# Patient Record
Sex: FEMALE | Race: WHITE | NOT HISPANIC OR LATINO | Employment: OTHER | URBAN - METROPOLITAN AREA
[De-identification: names, ages, dates, MRNs, and addresses within clinical notes are randomized per-mention and may not be internally consistent; named-entity substitution may affect disease eponyms.]

---

## 2023-04-07 LAB
ALANINE AMINOTRANSFERASE (SGPT) (U/L) IN SER/PLAS: 18 U/L (ref 7–45)
ALBUMIN (G/DL) IN SER/PLAS: 4 G/DL (ref 3.4–5)
ALKALINE PHOSPHATASE (U/L) IN SER/PLAS: 170 U/L (ref 33–136)
ANION GAP IN SER/PLAS: 11 MMOL/L (ref 10–20)
ASPARTATE AMINOTRANSFERASE (SGOT) (U/L) IN SER/PLAS: 16 U/L (ref 9–39)
BILIRUBIN TOTAL (MG/DL) IN SER/PLAS: 0.5 MG/DL (ref 0–1.2)
CALCIUM (MG/DL) IN SER/PLAS: 9.1 MG/DL (ref 8.6–10.3)
CARBON DIOXIDE, TOTAL (MMOL/L) IN SER/PLAS: 30 MMOL/L (ref 21–32)
CHLORIDE (MMOL/L) IN SER/PLAS: 100 MMOL/L (ref 98–107)
CHOLESTEROL (MG/DL) IN SER/PLAS: 160 MG/DL (ref 0–199)
CHOLESTEROL IN HDL (MG/DL) IN SER/PLAS: 52.1 MG/DL
CHOLESTEROL/HDL RATIO: 3.1
CREATININE (MG/DL) IN SER/PLAS: 0.72 MG/DL (ref 0.5–1.05)
ERYTHROCYTE DISTRIBUTION WIDTH (RATIO) BY AUTOMATED COUNT: 14.2 % (ref 11.5–14.5)
ERYTHROCYTE MEAN CORPUSCULAR HEMOGLOBIN CONCENTRATION (G/DL) BY AUTOMATED: 31.4 G/DL (ref 32–36)
ERYTHROCYTE MEAN CORPUSCULAR VOLUME (FL) BY AUTOMATED COUNT: 85 FL (ref 80–100)
ERYTHROCYTES (10*6/UL) IN BLOOD BY AUTOMATED COUNT: 4.93 X10E12/L (ref 4–5.2)
GFR FEMALE: >90 ML/MIN/1.73M2
GLUCOSE (MG/DL) IN SER/PLAS: 289 MG/DL (ref 74–99)
HEMATOCRIT (%) IN BLOOD BY AUTOMATED COUNT: 41.7 % (ref 36–46)
HEMOGLOBIN (G/DL) IN BLOOD: 13.1 G/DL (ref 12–16)
LDL: 76 MG/DL (ref 0–99)
LEUKOCYTES (10*3/UL) IN BLOOD BY AUTOMATED COUNT: 7.1 X10E9/L (ref 4.4–11.3)
NRBC (PER 100 WBCS) BY AUTOMATED COUNT: 0 /100 WBC (ref 0–0)
PLATELETS (10*3/UL) IN BLOOD AUTOMATED COUNT: 230 X10E9/L (ref 150–450)
POTASSIUM (MMOL/L) IN SER/PLAS: 3.8 MMOL/L (ref 3.5–5.3)
PROTEIN TOTAL: 7.1 G/DL (ref 6.4–8.2)
SODIUM (MMOL/L) IN SER/PLAS: 137 MMOL/L (ref 136–145)
THYROTROPIN (MIU/L) IN SER/PLAS BY DETECTION LIMIT <= 0.05 MIU/L: <0.01 MIU/L (ref 0.44–3.98)
TRIGLYCERIDE (MG/DL) IN SER/PLAS: 162 MG/DL (ref 0–149)
UREA NITROGEN (MG/DL) IN SER/PLAS: 28 MG/DL (ref 6–23)
VLDL: 32 MG/DL (ref 0–40)

## 2023-04-08 LAB
CALCIDIOL (25 OH VITAMIN D3) (NG/ML) IN SER/PLAS: 29 NG/ML
ESTIMATED AVERAGE GLUCOSE FOR HBA1C: 220 MG/DL
HEMOGLOBIN A1C/HEMOGLOBIN TOTAL IN BLOOD: 9.3 %

## 2023-07-21 LAB
ANION GAP IN SER/PLAS: 13 MMOL/L (ref 10–20)
CALCIUM (MG/DL) IN SER/PLAS: 9.4 MG/DL (ref 8.6–10.3)
CARBON DIOXIDE, TOTAL (MMOL/L) IN SER/PLAS: 28 MMOL/L (ref 21–32)
CHLORIDE (MMOL/L) IN SER/PLAS: 102 MMOL/L (ref 98–107)
CREATININE (MG/DL) IN SER/PLAS: 0.75 MG/DL (ref 0.5–1.05)
GFR FEMALE: 86 ML/MIN/1.73M2
GLUCOSE (MG/DL) IN SER/PLAS: 220 MG/DL (ref 74–99)
POTASSIUM (MMOL/L) IN SER/PLAS: 4.5 MMOL/L (ref 3.5–5.3)
SODIUM (MMOL/L) IN SER/PLAS: 138 MMOL/L (ref 136–145)
UREA NITROGEN (MG/DL) IN SER/PLAS: 22 MG/DL (ref 6–23)

## 2023-07-22 LAB
ESTIMATED AVERAGE GLUCOSE FOR HBA1C: 220 MG/DL
HEMOGLOBIN A1C/HEMOGLOBIN TOTAL IN BLOOD: 9.3 %

## 2023-08-22 LAB
ALANINE AMINOTRANSFERASE (SGPT) (U/L) IN SER/PLAS: 20 U/L (ref 7–45)
ALBUMIN (G/DL) IN SER/PLAS: 4 G/DL (ref 3.4–5)
ALKALINE PHOSPHATASE (U/L) IN SER/PLAS: 211 U/L (ref 33–136)
ANION GAP IN SER/PLAS: 13 MMOL/L (ref 10–20)
ASPARTATE AMINOTRANSFERASE (SGOT) (U/L) IN SER/PLAS: 19 U/L (ref 9–39)
BASOPHILS (10*3/UL) IN BLOOD BY AUTOMATED COUNT: 0.01 X10E9/L (ref 0–0.1)
BASOPHILS/100 LEUKOCYTES IN BLOOD BY AUTOMATED COUNT: 0.2 % (ref 0–2)
BILIRUBIN TOTAL (MG/DL) IN SER/PLAS: 0.5 MG/DL (ref 0–1.2)
CALCIUM (MG/DL) IN SER/PLAS: 9.2 MG/DL (ref 8.6–10.3)
CARBON DIOXIDE, TOTAL (MMOL/L) IN SER/PLAS: 25 MMOL/L (ref 21–32)
CHLORIDE (MMOL/L) IN SER/PLAS: 107 MMOL/L (ref 98–107)
CREATININE (MG/DL) IN SER/PLAS: 0.81 MG/DL (ref 0.5–1.05)
EOSINOPHILS (10*3/UL) IN BLOOD BY AUTOMATED COUNT: 0.1 X10E9/L (ref 0–0.7)
EOSINOPHILS/100 LEUKOCYTES IN BLOOD BY AUTOMATED COUNT: 1.6 % (ref 0–6)
ERYTHROCYTE DISTRIBUTION WIDTH (RATIO) BY AUTOMATED COUNT: 13.4 % (ref 11.5–14.5)
ERYTHROCYTE MEAN CORPUSCULAR HEMOGLOBIN CONCENTRATION (G/DL) BY AUTOMATED: 30.6 G/DL (ref 32–36)
ERYTHROCYTE MEAN CORPUSCULAR VOLUME (FL) BY AUTOMATED COUNT: 85 FL (ref 80–100)
ERYTHROCYTES (10*6/UL) IN BLOOD BY AUTOMATED COUNT: 5.08 X10E12/L (ref 4–5.2)
GFR FEMALE: 79 ML/MIN/1.73M2
GLUCOSE (MG/DL) IN SER/PLAS: 216 MG/DL (ref 74–99)
HEMATOCRIT (%) IN BLOOD BY AUTOMATED COUNT: 43.4 % (ref 36–46)
HEMOGLOBIN (G/DL) IN BLOOD: 13.3 G/DL (ref 12–16)
IMMATURE GRANULOCYTES/100 LEUKOCYTES IN BLOOD BY AUTOMATED COUNT: 0.2 % (ref 0–0.9)
INR IN PPP BY COAGULATION ASSAY: 1.1 (ref 0.9–1.1)
LEUKOCYTES (10*3/UL) IN BLOOD BY AUTOMATED COUNT: 6.3 X10E9/L (ref 4.4–11.3)
LYMPHOCYTES (10*3/UL) IN BLOOD BY AUTOMATED COUNT: 1.43 X10E9/L (ref 1.2–4.8)
LYMPHOCYTES/100 LEUKOCYTES IN BLOOD BY AUTOMATED COUNT: 22.9 % (ref 13–44)
MONOCYTES (10*3/UL) IN BLOOD BY AUTOMATED COUNT: 0.43 X10E9/L (ref 0.1–1)
MONOCYTES/100 LEUKOCYTES IN BLOOD BY AUTOMATED COUNT: 6.9 % (ref 2–10)
NEUTROPHILS (10*3/UL) IN BLOOD BY AUTOMATED COUNT: 4.27 X10E9/L (ref 1.2–7.7)
NEUTROPHILS/100 LEUKOCYTES IN BLOOD BY AUTOMATED COUNT: 68.2 % (ref 40–80)
NRBC (PER 100 WBCS) BY AUTOMATED COUNT: 0 /100 WBC (ref 0–0)
PLATELETS (10*3/UL) IN BLOOD AUTOMATED COUNT: 231 X10E9/L (ref 150–450)
POTASSIUM (MMOL/L) IN SER/PLAS: 4.2 MMOL/L (ref 3.5–5.3)
PROTEIN TOTAL: 7.2 G/DL (ref 6.4–8.2)
PROTHROMBIN TIME (PT) IN PPP BY COAGULATION ASSAY: 11.9 SEC (ref 9.8–12.8)
SODIUM (MMOL/L) IN SER/PLAS: 141 MMOL/L (ref 136–145)
UREA NITROGEN (MG/DL) IN SER/PLAS: 29 MG/DL (ref 6–23)

## 2023-08-23 LAB — ALPHA-1 FETOPROTEIN (NG/ML) IN SER/PLAS: 5 NG/ML (ref 0–9)

## 2023-09-06 ENCOUNTER — HOSPITAL ENCOUNTER (OUTPATIENT)
Dept: DATA CONVERSION | Facility: HOSPITAL | Age: 68
End: 2023-09-06
Attending: INTERNAL MEDICINE | Admitting: INTERNAL MEDICINE
Payer: COMMERCIAL

## 2023-09-06 DIAGNOSIS — K20.90 ESOPHAGITIS, UNSPECIFIED WITHOUT BLEEDING: ICD-10-CM

## 2023-09-06 DIAGNOSIS — K74.60 UNSPECIFIED CIRRHOSIS OF LIVER (MULTI): ICD-10-CM

## 2023-09-06 DIAGNOSIS — I85.10 SECONDARY ESOPHAGEAL VARICES WITHOUT BLEEDING (MULTI): ICD-10-CM

## 2023-09-06 LAB — POCT GLUCOSE: 159 MG/DL (ref 74–99)

## 2023-09-29 VITALS — BODY MASS INDEX: 37.49 KG/M2 | WEIGHT: 211.64 LBS

## 2023-10-09 ENCOUNTER — LAB (OUTPATIENT)
Dept: LAB | Facility: LAB | Age: 68
End: 2023-10-09
Payer: COMMERCIAL

## 2023-10-09 DIAGNOSIS — I10 ESSENTIAL (PRIMARY) HYPERTENSION: ICD-10-CM

## 2023-10-09 DIAGNOSIS — E78.00 PURE HYPERCHOLESTEROLEMIA, UNSPECIFIED: ICD-10-CM

## 2023-10-09 DIAGNOSIS — E11.9 TYPE 2 DIABETES MELLITUS WITHOUT COMPLICATIONS (MULTI): Primary | ICD-10-CM

## 2023-10-09 DIAGNOSIS — E55.9 VITAMIN D DEFICIENCY, UNSPECIFIED: ICD-10-CM

## 2023-10-09 LAB
25(OH)D3 SERPL-MCNC: 35 NG/ML (ref 30–100)
ALBUMIN SERPL BCP-MCNC: 4.2 G/DL (ref 3.4–5)
ALP SERPL-CCNC: 171 U/L (ref 33–136)
ALT SERPL W P-5'-P-CCNC: 20 U/L (ref 7–45)
ANION GAP SERPL CALC-SCNC: 13 MMOL/L (ref 10–20)
AST SERPL W P-5'-P-CCNC: 19 U/L (ref 9–39)
BILIRUB SERPL-MCNC: 0.6 MG/DL (ref 0–1.2)
BUN SERPL-MCNC: 22 MG/DL (ref 6–23)
CALCIUM SERPL-MCNC: 9.1 MG/DL (ref 8.6–10.3)
CHLORIDE SERPL-SCNC: 101 MMOL/L (ref 98–107)
CHOLEST SERPL-MCNC: 145 MG/DL (ref 0–199)
CHOLESTEROL/HDL RATIO: 3.6
CO2 SERPL-SCNC: 27 MMOL/L (ref 21–32)
CREAT SERPL-MCNC: 0.72 MG/DL (ref 0.5–1.05)
ERYTHROCYTE [DISTWIDTH] IN BLOOD BY AUTOMATED COUNT: 13.2 % (ref 11.5–14.5)
EST. AVERAGE GLUCOSE BLD GHB EST-MCNC: 212 MG/DL
GFR SERPL CREATININE-BSD FRML MDRD: >90 ML/MIN/1.73M*2
GLUCOSE SERPL-MCNC: 337 MG/DL (ref 74–99)
HBA1C MFR BLD: 9 %
HCT VFR BLD AUTO: 45.8 % (ref 36–46)
HDLC SERPL-MCNC: 40.4 MG/DL
HGB BLD-MCNC: 14 G/DL (ref 12–16)
LDLC SERPL CALC-MCNC: 82 MG/DL (ref 140–190)
MCH RBC QN AUTO: 25.7 PG (ref 26–34)
MCHC RBC AUTO-ENTMCNC: 30.6 G/DL (ref 32–36)
MCV RBC AUTO: 84 FL (ref 80–100)
NON HDL CHOLESTEROL: 105 MG/DL (ref 0–149)
NRBC BLD-RTO: 0 /100 WBCS (ref 0–0)
PLATELET # BLD AUTO: 243 X10*3/UL (ref 150–450)
PMV BLD AUTO: 10.5 FL (ref 7.5–11.5)
POTASSIUM SERPL-SCNC: 4.9 MMOL/L (ref 3.5–5.3)
PROT SERPL-MCNC: 7.1 G/DL (ref 6.4–8.2)
RBC # BLD AUTO: 5.45 X10*6/UL (ref 4–5.2)
SODIUM SERPL-SCNC: 136 MMOL/L (ref 136–145)
TRIGL SERPL-MCNC: 113 MG/DL (ref 0–149)
VLDL: 23 MG/DL (ref 0–40)
WBC # BLD AUTO: 5.2 X10*3/UL (ref 4.4–11.3)

## 2023-10-09 PROCEDURE — 80053 COMPREHEN METABOLIC PANEL: CPT

## 2023-10-09 PROCEDURE — 82306 VITAMIN D 25 HYDROXY: CPT

## 2023-10-09 PROCEDURE — 36415 COLL VENOUS BLD VENIPUNCTURE: CPT

## 2023-10-09 PROCEDURE — 80061 LIPID PANEL: CPT

## 2023-10-09 PROCEDURE — 85027 COMPLETE CBC AUTOMATED: CPT

## 2023-10-09 PROCEDURE — 83036 HEMOGLOBIN GLYCOSYLATED A1C: CPT

## 2023-10-24 RX ORDER — FLASH GLUCOSE SENSOR
KIT MISCELLANEOUS
COMMUNITY
Start: 2023-09-19

## 2023-10-24 RX ORDER — GLIMEPIRIDE 4 MG/1
4 TABLET ORAL
COMMUNITY

## 2023-10-24 RX ORDER — CYCLOBENZAPRINE HCL 5 MG
5 TABLET ORAL 3 TIMES DAILY PRN
COMMUNITY
Start: 2023-09-04

## 2023-10-24 RX ORDER — BLOOD SUGAR DIAGNOSTIC
STRIP MISCELLANEOUS
COMMUNITY

## 2023-10-24 RX ORDER — APIXABAN 5 MG/1
5 TABLET, FILM COATED ORAL 2 TIMES DAILY
COMMUNITY
Start: 2019-10-30

## 2023-10-24 RX ORDER — METHIMAZOLE 5 MG/1
5 TABLET ORAL 3 TIMES DAILY
COMMUNITY
Start: 2021-03-30

## 2023-10-24 RX ORDER — METRONIDAZOLE 500 MG/1
500 TABLET ORAL
COMMUNITY
Start: 2023-10-04

## 2023-10-24 RX ORDER — SPIRONOLACTONE 25 MG/1
25 TABLET ORAL DAILY
COMMUNITY
Start: 2023-07-18 | End: 2024-04-30

## 2023-10-24 RX ORDER — GABAPENTIN 100 MG/1
100 CAPSULE ORAL 2 TIMES DAILY
COMMUNITY
Start: 2023-04-04

## 2023-10-24 RX ORDER — HYDROCODONE BITARTRATE AND ACETAMINOPHEN 5; 325 MG/1; MG/1
1 TABLET ORAL EVERY 4 HOURS PRN
COMMUNITY
Start: 2016-12-07

## 2023-10-24 RX ORDER — CLOTRIMAZOLE 1 G/ML
SOLUTION TOPICAL
COMMUNITY

## 2023-10-24 RX ORDER — TORSEMIDE 20 MG/1
20 TABLET ORAL DAILY
COMMUNITY

## 2023-10-24 RX ORDER — LORAZEPAM 1 MG/1
1 TABLET ORAL DAILY PRN
COMMUNITY
Start: 2020-01-24

## 2023-10-24 RX ORDER — EMPAGLIFLOZIN 25 MG/1
25 TABLET, FILM COATED ORAL DAILY
COMMUNITY
Start: 2023-01-20 | End: 2024-02-21 | Stop reason: WASHOUT

## 2023-10-24 RX ORDER — METOPROLOL TARTRATE 25 MG/1
25 TABLET, FILM COATED ORAL 2 TIMES DAILY
COMMUNITY

## 2023-10-24 RX ORDER — OMEPRAZOLE 40 MG/1
40 CAPSULE, DELAYED RELEASE ORAL DAILY
COMMUNITY
Start: 2023-09-18 | End: 2024-03-12

## 2023-10-24 RX ORDER — NEOMYCIN SULFATE, POLYMYXIN B SULFATE AND HYDROCORTISONE 10; 3.5; 1 MG/ML; MG/ML; [USP'U]/ML
SUSPENSION/ DROPS AURICULAR (OTIC)
COMMUNITY
Start: 2023-09-28

## 2023-10-24 RX ORDER — NITROGLYCERIN 0.4 MG/1
0.4 TABLET SUBLINGUAL EVERY 5 MIN PRN
COMMUNITY
Start: 2019-11-22

## 2023-10-24 RX ORDER — MUPIROCIN 20 MG/G
OINTMENT TOPICAL
COMMUNITY
Start: 2023-09-28

## 2023-10-24 RX ORDER — DAPAGLIFLOZIN 10 MG/1
10 TABLET, FILM COATED ORAL DAILY
COMMUNITY
Start: 2023-09-18 | End: 2023-11-21

## 2023-10-24 RX ORDER — GABAPENTIN 300 MG/1
300 CAPSULE ORAL NIGHTLY
COMMUNITY
Start: 2023-10-02 | End: 2024-03-19

## 2023-10-24 RX ORDER — AMLODIPINE BESYLATE 10 MG/1
10 TABLET ORAL DAILY
COMMUNITY
Start: 2020-01-24 | End: 2023-10-26 | Stop reason: SDUPTHER

## 2023-10-24 RX ORDER — FLASH GLUCOSE SCANNING READER
EACH MISCELLANEOUS
COMMUNITY
Start: 2023-06-28

## 2023-10-25 PROBLEM — L29.9 EAR ITCH: Status: ACTIVE | Noted: 2023-10-25

## 2023-10-25 PROBLEM — I48.11 LONGSTANDING PERSISTENT ATRIAL FIBRILLATION (MULTI): Status: ACTIVE | Noted: 2023-10-25

## 2023-10-25 PROBLEM — D18.01 HEMANGIOMA OF SKIN AND SUBCUTANEOUS TISSUE: Status: ACTIVE | Noted: 2022-08-12

## 2023-10-25 PROBLEM — L30.9 DERMATITIS: Status: ACTIVE | Noted: 2022-08-12

## 2023-10-25 PROBLEM — R94.39 ABNORMAL NUCLEAR STRESS TEST: Status: ACTIVE | Noted: 2023-10-25

## 2023-10-25 PROBLEM — D22.5 MELANOCYTIC NEVI OF TRUNK: Status: ACTIVE | Noted: 2022-08-12

## 2023-10-25 PROBLEM — L57.9 SKIN CHANGES DUE TO CHRONIC EXPOSURE TO NONIONIZING RADIATION, UNSPECIFIED: Status: ACTIVE | Noted: 2022-08-12

## 2023-10-25 PROBLEM — U07.1 COVID-19: Status: RESOLVED | Noted: 2023-10-25 | Resolved: 2023-10-25

## 2023-10-25 PROBLEM — R77.2 ELEVATED AFP: Status: ACTIVE | Noted: 2023-10-25

## 2023-10-25 PROBLEM — L30.4 INTERTRIGO: Status: ACTIVE | Noted: 2022-08-12

## 2023-10-25 PROBLEM — H73.002 ACUTE MYRINGITIS OF LEFT EAR: Status: RESOLVED | Noted: 2023-10-25 | Resolved: 2023-10-25

## 2023-10-25 PROBLEM — U07.1 COVID-19: Status: ACTIVE | Noted: 2023-10-25

## 2023-10-25 PROBLEM — H60.92 OTITIS EXTERNA, LEFT: Status: ACTIVE | Noted: 2023-10-25

## 2023-10-25 PROBLEM — B18.2 CHRONIC HEPATITIS C WITH CIRRHOSIS (MULTI): Status: ACTIVE | Noted: 2023-10-25

## 2023-10-25 PROBLEM — H73.002 ACUTE MYRINGITIS OF LEFT EAR: Status: ACTIVE | Noted: 2023-10-25

## 2023-10-25 PROBLEM — R10.2 LEFT ADNEXAL TENDERNESS: Status: ACTIVE | Noted: 2023-10-25

## 2023-10-25 PROBLEM — I25.10 CORONARY ARTERY DISEASE: Status: ACTIVE | Noted: 2023-10-25

## 2023-10-25 PROBLEM — K52.9 CHRONIC DIARRHEA OF UNKNOWN ORIGIN: Status: ACTIVE | Noted: 2023-10-25

## 2023-10-25 PROBLEM — B36.9 OTOMYCOSIS OF RIGHT EAR: Status: ACTIVE | Noted: 2023-10-25

## 2023-10-25 PROBLEM — N39.3 STRESS INCONTINENCE, FEMALE: Status: ACTIVE | Noted: 2023-10-25

## 2023-10-25 PROBLEM — M25.562 LEFT KNEE PAIN: Status: ACTIVE | Noted: 2023-10-25

## 2023-10-25 PROBLEM — M79.673 ACUTE FOOT PAIN: Status: RESOLVED | Noted: 2023-10-25 | Resolved: 2023-10-25

## 2023-10-25 PROBLEM — L57.0 ACTINIC KERATOSIS: Status: ACTIVE | Noted: 2022-08-12

## 2023-10-25 PROBLEM — R07.89 CHEST TIGHTNESS: Status: ACTIVE | Noted: 2023-10-25

## 2023-10-25 PROBLEM — I50.32 CHRONIC DIASTOLIC (CONGESTIVE) HEART FAILURE (MULTI): Status: ACTIVE | Noted: 2023-10-25

## 2023-10-25 PROBLEM — D36.9 BENIGN NEOPLASM: Status: ACTIVE | Noted: 2022-08-12

## 2023-10-25 PROBLEM — L21.9 SEBORRHEIC DERMATITIS: Status: ACTIVE | Noted: 2022-08-12

## 2023-10-25 PROBLEM — D18.00 ANGIOMA: Status: ACTIVE | Noted: 2022-08-12

## 2023-10-25 PROBLEM — H52.00: Status: ACTIVE | Noted: 2023-10-25

## 2023-10-25 PROBLEM — J34.89 NASAL DRYNESS: Status: ACTIVE | Noted: 2023-10-25

## 2023-10-25 PROBLEM — R10.32 CHRONIC LLQ PAIN: Status: ACTIVE | Noted: 2023-10-25

## 2023-10-25 PROBLEM — M17.0 PRIMARY OSTEOARTHRITIS OF BOTH KNEES: Status: ACTIVE | Noted: 2023-10-25

## 2023-10-25 PROBLEM — H25.813 COMBINED FORM OF SENILE CATARACT OF BOTH EYES: Status: ACTIVE | Noted: 2023-10-25

## 2023-10-25 PROBLEM — D22.4 MELANOCYTIC NEVI OF SCALP AND NECK: Status: ACTIVE | Noted: 2022-08-12

## 2023-10-25 PROBLEM — M25.512 ACUTE PAIN OF LEFT SHOULDER: Status: RESOLVED | Noted: 2023-10-25 | Resolved: 2023-10-25

## 2023-10-25 PROBLEM — D48.5 NEOPLASM OF UNCERTAIN BEHAVIOR OF SKIN: Status: ACTIVE | Noted: 2022-08-12

## 2023-10-25 PROBLEM — L81.4 LENTIGINES: Status: ACTIVE | Noted: 2022-08-12

## 2023-10-25 PROBLEM — G89.29 CHRONIC BILATERAL LOW BACK PAIN WITH RIGHT-SIDED SCIATICA: Status: ACTIVE | Noted: 2023-10-25

## 2023-10-25 PROBLEM — R06.02 SHORTNESS OF BREATH: Status: ACTIVE | Noted: 2023-10-25

## 2023-10-25 PROBLEM — L60.3 ONYCHODYSTROPHY: Status: ACTIVE | Noted: 2022-08-12

## 2023-10-25 PROBLEM — K74.60 CHRONIC HEPATITIS C WITH CIRRHOSIS (MULTI): Status: ACTIVE | Noted: 2023-10-25

## 2023-10-25 PROBLEM — E11.9 TYPE 2 DIABETES MELLITUS WITHOUT COMPLICATIONS (MULTI): Status: ACTIVE | Noted: 2023-10-25

## 2023-10-25 PROBLEM — M25.512 ACUTE PAIN OF LEFT SHOULDER: Status: ACTIVE | Noted: 2023-10-25

## 2023-10-25 PROBLEM — D22.60 MELANOCYTIC NEVI OF UNSPECIFIED UPPER LIMB, INCLUDING SHOULDER: Status: ACTIVE | Noted: 2022-08-12

## 2023-10-25 PROBLEM — H61.22 EXCESSIVE CERUMEN IN LEFT EAR CANAL: Status: ACTIVE | Noted: 2023-10-25

## 2023-10-25 PROBLEM — M79.673 ACUTE FOOT PAIN: Status: ACTIVE | Noted: 2023-10-25

## 2023-10-25 PROBLEM — I85.00 ESOPHAGEAL VARICES (MULTI): Status: ACTIVE | Noted: 2023-10-25

## 2023-10-25 PROBLEM — Z85.828 PERSONAL HISTORY OF OTHER MALIGNANT NEOPLASM OF SKIN: Status: ACTIVE | Noted: 2022-08-12

## 2023-10-25 PROBLEM — D22.39 MELANOCYTIC NEVI OF OTHER PARTS OF FACE: Status: ACTIVE | Noted: 2022-08-12

## 2023-10-25 PROBLEM — H52.229: Status: ACTIVE | Noted: 2023-10-25

## 2023-10-25 PROBLEM — M25.562 BILATERAL KNEE PAIN: Status: ACTIVE | Noted: 2023-10-25

## 2023-10-25 PROBLEM — M17.12 PRIMARY OSTEOARTHRITIS OF LEFT KNEE: Status: ACTIVE | Noted: 2023-10-25

## 2023-10-25 PROBLEM — K76.82 HEPATIC ENCEPHALOPATHY (MULTI): Status: ACTIVE | Noted: 2023-10-25

## 2023-10-25 PROBLEM — G89.29 CHRONIC LLQ PAIN: Status: ACTIVE | Noted: 2023-10-25

## 2023-10-25 PROBLEM — H62.41 OTOMYCOSIS OF RIGHT EAR: Status: ACTIVE | Noted: 2023-10-25

## 2023-10-25 PROBLEM — H93.8X1 SENSATION OF PLUGGED EAR ON RIGHT SIDE: Status: ACTIVE | Noted: 2023-10-25

## 2023-10-25 PROBLEM — K58.0 IRRITABLE BOWEL SYNDROME WITH DIARRHEA: Status: ACTIVE | Noted: 2023-10-25

## 2023-10-25 PROBLEM — R60.0 LEG EDEMA: Status: ACTIVE | Noted: 2023-10-25

## 2023-10-25 PROBLEM — M25.561 BILATERAL KNEE PAIN: Status: ACTIVE | Noted: 2023-10-25

## 2023-10-25 PROBLEM — J34.89 NASAL CRUSTING: Status: ACTIVE | Noted: 2023-10-25

## 2023-10-25 PROBLEM — D22.70 MELANOCYTIC NEVI OF UNSPECIFIED LOWER LIMB, INCLUDING HIP: Status: ACTIVE | Noted: 2022-08-12

## 2023-10-25 PROBLEM — H52.03 HYPEROPIA OF BOTH EYES: Status: ACTIVE | Noted: 2023-10-25

## 2023-10-25 PROBLEM — M54.41 CHRONIC BILATERAL LOW BACK PAIN WITH RIGHT-SIDED SCIATICA: Status: ACTIVE | Noted: 2023-10-25

## 2023-10-25 PROBLEM — L81.4 OTHER MELANIN HYPERPIGMENTATION: Status: ACTIVE | Noted: 2022-08-12

## 2023-10-26 ENCOUNTER — OFFICE VISIT (OUTPATIENT)
Dept: CARDIOLOGY | Facility: CLINIC | Age: 68
End: 2023-10-26
Payer: COMMERCIAL

## 2023-10-26 VITALS
HEIGHT: 63 IN | BODY MASS INDEX: 37.49 KG/M2 | OXYGEN SATURATION: 97 % | HEART RATE: 66 BPM | WEIGHT: 211.6 LBS | SYSTOLIC BLOOD PRESSURE: 118 MMHG | DIASTOLIC BLOOD PRESSURE: 64 MMHG

## 2023-10-26 DIAGNOSIS — I50.32 CHRONIC DIASTOLIC (CONGESTIVE) HEART FAILURE (MULTI): ICD-10-CM

## 2023-10-26 DIAGNOSIS — I10 PRIMARY HYPERTENSION: ICD-10-CM

## 2023-10-26 DIAGNOSIS — I25.10 CORONARY ARTERY DISEASE INVOLVING NATIVE CORONARY ARTERY OF NATIVE HEART WITHOUT ANGINA PECTORIS: Primary | ICD-10-CM

## 2023-10-26 DIAGNOSIS — I48.11 LONGSTANDING PERSISTENT ATRIAL FIBRILLATION (MULTI): ICD-10-CM

## 2023-10-26 PROCEDURE — 3078F DIAST BP <80 MM HG: CPT | Performed by: INTERNAL MEDICINE

## 2023-10-26 PROCEDURE — 1126F AMNT PAIN NOTED NONE PRSNT: CPT | Performed by: INTERNAL MEDICINE

## 2023-10-26 PROCEDURE — 3048F LDL-C <100 MG/DL: CPT | Performed by: INTERNAL MEDICINE

## 2023-10-26 PROCEDURE — 1036F TOBACCO NON-USER: CPT | Performed by: INTERNAL MEDICINE

## 2023-10-26 PROCEDURE — 1159F MED LIST DOCD IN RCRD: CPT | Performed by: INTERNAL MEDICINE

## 2023-10-26 PROCEDURE — 99214 OFFICE O/P EST MOD 30 MIN: CPT | Performed by: INTERNAL MEDICINE

## 2023-10-26 PROCEDURE — 3074F SYST BP LT 130 MM HG: CPT | Performed by: INTERNAL MEDICINE

## 2023-10-26 PROCEDURE — 3052F HG A1C>EQUAL 8.0%<EQUAL 9.0%: CPT | Performed by: INTERNAL MEDICINE

## 2023-10-26 RX ORDER — AMLODIPINE BESYLATE 5 MG/1
5 TABLET ORAL DAILY
Qty: 90 TABLET | Refills: 3 | Status: SHIPPED | OUTPATIENT
Start: 2023-10-26 | End: 2024-10-25

## 2023-10-26 NOTE — PROGRESS NOTES
Subjective   Zeny Scott is a 68 y.o. female.    Chief Complaint:  Follow-up coronary disease, congestive heart failure, atrial fibrillation, shortness of breath.    HPI    She was vacationing in [place] in August.  She had a fall.  She fell hitting her chest on the floor.  No head injuries.  Did go to the hospital.  Had a CT of the brain which was negative.  Also had a CT of the chest which demonstrated no significant abnormalities.  Subsequent to that time she still having some chest pain.  It is pleuritic in character.  Occurs when she has a deep breath in.  Generally located on the lateral aspects of both sides of the chest.  Does have shortness of breath and dyspnea with exertion.  Denies palpitations or chest pressure.  Has been struggling with degenerative arthritis.  Also has been struggling with her diabetes.    Cardiac catheterization performed on 2022 demonstrated 30 to 40% left anterior descending coronary artery stenosis with a normal circumflex and a normal right coronary artery. Medical therapy was recommended.     Her diagnosis of coronary artery disease is also based on a positive calcium score of 676 consistent with the presence of extensive atherosclerotic coronary artery disease.     Her past cardiac history significant for the presence of atrial fibrillation. In  she presented with atrial fibrillation and spontaneously converted. In 2019 she had an EKG which demonstrated atrial fibrillation.      Risk factors for coronary disease include hypertension, diabetes, and a positive family history of premature coronary artery disease.     She has a history of cirrhosis of the liver. She's also had a cholecystectomy. Has a history of elevated liver enzymes on statin therapy.     She is . Has 5 children one of which .  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     Allergies  Medication    · oxycodone   Rash;   "2018; Recorded By: Letty Chavez; 9/22/2020 9:30:55 AM   · Percocet TABS   Recorded By: Radha Roberts; 7/13/2015 9:50:08 AM  NonMedication    · Nickel   Recorded By: Kade Savage; 10/7/2022 4:23:42 PM     Family History  Mother    · Family history of Alzheimer's disease (V17.2) (Z82.0)  Father    · Family history of cardiac disorder (V17.49) (Z82.49)   · Family history of kidney disease (V18.69) (Z84.1)   · Family history of kidney disease (V18.69) (Z84.1)     Social History  Problems    · Never smoker   ·     Review of Systems   All other systems reviewed and are negative.      Objective   Constitutional:       Appearance: Not in distress.   Neck:      Vascular: JVD normal.   Pulmonary:      Breath sounds: Normal breath sounds.   Cardiovascular:      Normal rate. Irregular rhythm. Normal S1. Normal S2.       Murmurs: There is no murmur.      No gallop.    Pulses:     Intact distal pulses.   Edema:     Peripheral edema absent.   Abdominal:      General: There is no distension.      Palpations: Abdomen is soft.   Neurological:      Mental Status: Alert.         Visit Vitals  /64 (BP Location: Left arm, Patient Position: Sitting, BP Cuff Size: Large adult)   Pulse 66   Ht 1.6 m (5' 3\")   Wt 96 kg (211 lb 9.6 oz)   SpO2 97%   BMI 37.48 kg/m²   Smoking Status Never   BSA 2.07 m²        Lab Review:   Lab Results   Component Value Date     10/09/2023    K 4.9 10/09/2023     10/09/2023    CO2 27 10/09/2023    BUN 22 10/09/2023    CREATININE 0.72 10/09/2023    GLUCOSE 337 (H) 10/09/2023    CALCIUM 9.1 10/09/2023       Assessment:    1.  Atrial fibrillation.  Has permanent atrial fibrillation.  Controlled ventricular response.  On anticoagulation therapy.  Has been in atrial fibrillation since 2017.    2.  Coronary disease.  Mild disease by cardiac catheterization performed in 2022.    3.  Hyperlipidemia.  Cholesterol 145, HDL 40, LDL 82.    4.  Chest injury.  Lungs are clear.  Oxygen saturation is normal.  " Wants to see a pulmonologist but I do not think there is a need.    5.  Diastolic heart failure.  No heart failure by exam.    6.  Diabetes.  Refer to endocrinology.

## 2023-11-08 ENCOUNTER — TELEPHONE (OUTPATIENT)
Dept: GASTROENTEROLOGY | Facility: CLINIC | Age: 68
End: 2023-11-08
Payer: COMMERCIAL

## 2023-11-08 NOTE — TELEPHONE ENCOUNTER
Pt called requesting a Rx for Cologaurd instead of doing the procedure in the hospital. She would rather do it at home. Pt would like a call back at [phone]

## 2023-11-21 DIAGNOSIS — I50.32 CHRONIC DIASTOLIC (CONGESTIVE) HEART FAILURE (MULTI): Primary | ICD-10-CM

## 2023-11-21 RX ORDER — DAPAGLIFLOZIN 10 MG/1
10 TABLET, FILM COATED ORAL DAILY
Qty: 90 TABLET | Refills: 3 | Status: SHIPPED | OUTPATIENT
Start: 2023-11-21

## 2023-12-21 ENCOUNTER — OFFICE VISIT (OUTPATIENT)
Dept: PODIATRY | Facility: CLINIC | Age: 68
End: 2023-12-21
Payer: COMMERCIAL

## 2023-12-21 ENCOUNTER — ANCILLARY PROCEDURE (OUTPATIENT)
Dept: RADIOLOGY | Facility: CLINIC | Age: 68
End: 2023-12-21
Payer: COMMERCIAL

## 2023-12-21 DIAGNOSIS — I83.93 ASYMPTOMATIC VARICOSE VEINS OF BOTH LOWER EXTREMITIES: ICD-10-CM

## 2023-12-21 DIAGNOSIS — M76.61 ACHILLES TENDINITIS OF RIGHT LOWER EXTREMITY: ICD-10-CM

## 2023-12-21 DIAGNOSIS — M79.671 PAIN IN BOTH FEET: ICD-10-CM

## 2023-12-21 DIAGNOSIS — M79.672 PAIN IN BOTH FEET: ICD-10-CM

## 2023-12-21 DIAGNOSIS — E11.49 TYPE II DIABETES MELLITUS WITH NEUROLOGICAL MANIFESTATIONS (MULTI): ICD-10-CM

## 2023-12-21 DIAGNOSIS — M76.61 ACHILLES TENDINITIS OF RIGHT LOWER EXTREMITY: Primary | ICD-10-CM

## 2023-12-21 PROCEDURE — 3048F LDL-C <100 MG/DL: CPT | Performed by: PODIATRIST

## 2023-12-21 PROCEDURE — 1126F AMNT PAIN NOTED NONE PRSNT: CPT | Performed by: PODIATRIST

## 2023-12-21 PROCEDURE — L4396 STATIC OR DYNAMI AFO PRE CST: HCPCS | Performed by: PODIATRIST

## 2023-12-21 PROCEDURE — 1036F TOBACCO NON-USER: CPT | Performed by: PODIATRIST

## 2023-12-21 PROCEDURE — 73610 X-RAY EXAM OF ANKLE: CPT | Mod: RT

## 2023-12-21 PROCEDURE — 99203 OFFICE O/P NEW LOW 30 MIN: CPT | Performed by: PODIATRIST

## 2023-12-21 PROCEDURE — L4360 PNEUMAT WALKING BOOT PRE CST: HCPCS | Performed by: PODIATRIST

## 2023-12-21 PROCEDURE — 1159F MED LIST DOCD IN RCRD: CPT | Performed by: PODIATRIST

## 2023-12-21 PROCEDURE — 3052F HG A1C>EQUAL 8.0%<EQUAL 9.0%: CPT | Performed by: PODIATRIST

## 2023-12-21 PROCEDURE — 73610 X-RAY EXAM OF ANKLE: CPT | Mod: RIGHT SIDE | Performed by: RADIOLOGY

## 2023-12-21 RX ORDER — AZITHROMYCIN 250 MG/1
TABLET, FILM COATED ORAL
COMMUNITY
Start: 2023-12-20

## 2023-12-21 NOTE — PROGRESS NOTES
"Chief Complaint   Patient presents with    DM Foot Care     ANDREY     New patient here today for diabetic foot care as well as dark spots on the top of left foot.  Zeny has been a diabetic since the age of [age] and has been able to care for her own feet until now. Previous patient is returning to establish care.     Multiple complaints:  C/O burning and tingling sensations in the feet that are increasing at night.  Pain wakes her up and \"doesn't let me sleep\".  States glucose is always \"high\".  Also pain in R Achilles tendon at night, elevation on pillow helps.  Right Achilles tendon pain started after she was compensating due to needing surgery for left knee DJD.  Currently not a candidate for surgical procedure secondary to the high HA1C.  Oftentimes when she stands she lifts her left foot completely off the ground,  adding all her weight on the right side.  Dorsiflex and plantar flexing the foot throughout the day helps the Achilles pain.  Patient is concerned about some purple spots on top of her foot noticed that they increased in intensity of color during taking a shower.  Denies any pain on the dorsal foot.    PMH, PSX, medications and allergies are reviewed on chart  ROS negative except for what stated in HPI    Physical exam  Patient alert, oriented, no acute distress    VASC: +2/4 pedal pulses B/L.  CFT brisk all digits.  Feet warm to touch.  (+)hair growth B/L.   Mild LE edema B/L and multiple varicosities.  Multiple spider veins noted on the dorsal left and right foot with a purpleish red discoloration.    NEURO: Vibratory intact B/L. Light touch intact B/L  5.07 [place] monofilament absent first digit B/L, intact elsewhere B/L.    DERM: Multiple nail are thickened, discolored, crumbly, painful with subungual debris.  No cellulitis noted B/L.      MUSCULOSKEL: +5/5 muscle strength B/L.    Decreased ankle joint ROM B/L.  Palpable fusiform Achilles tendon noted right.  No palpable gaps in " Achilles tendon  Pain upon palpation of the Achilles tendon 2 cm proximal to insertion on the calcaneus right.  No palpable bursa noted.  Lab Results   Component Value Date    HGBA1C 9.0 (H) 10/09/2023      Assessment and plan  #1 Achilles tendinitis/tendinosis  Dispensed night splint  Dispense CAM walker  Rx: X-rays R ankle  Continue with nonweightbearing range of motion exercises  Tylenol and ice as needed  Follow-up 2 weeks    #2 Diabetes mellitus with peripheral neuropathy  Discussed importance of good glucose control for controlling neuropathic symptoms  Rx: topical neuropathic cream  Follow-up 2 weeks    #3 Multiple varicose veins  Monitor systems  Elevate feet/legs throughout the day

## 2024-01-04 ENCOUNTER — OFFICE VISIT (OUTPATIENT)
Dept: PODIATRY | Facility: CLINIC | Age: 69
End: 2024-01-04
Payer: COMMERCIAL

## 2024-01-04 DIAGNOSIS — E11.49 TYPE II DIABETES MELLITUS WITH NEUROLOGICAL MANIFESTATIONS (MULTI): Primary | ICD-10-CM

## 2024-01-04 DIAGNOSIS — M79.671 PAIN IN BOTH FEET: ICD-10-CM

## 2024-01-04 DIAGNOSIS — M67.873 ACHILLES TENDINOSIS OF RIGHT ANKLE: ICD-10-CM

## 2024-01-04 DIAGNOSIS — M79.672 PAIN IN BOTH FEET: ICD-10-CM

## 2024-01-04 PROCEDURE — 1036F TOBACCO NON-USER: CPT | Performed by: PODIATRIST

## 2024-01-04 PROCEDURE — 99214 OFFICE O/P EST MOD 30 MIN: CPT | Performed by: PODIATRIST

## 2024-01-04 PROCEDURE — 1159F MED LIST DOCD IN RCRD: CPT | Performed by: PODIATRIST

## 2024-01-04 PROCEDURE — 1126F AMNT PAIN NOTED NONE PRSNT: CPT | Performed by: PODIATRIST

## 2024-01-04 NOTE — PROGRESS NOTES
"Chief Complaint   Patient presents with    Follow-up     Right ankle      F/U R Achilles pain.  Was putting the CAM walker on wrong, was instructed by the MA on proper use today.  Takes 800 mg of ibuprofen.  Topical neuropathic combination cream \"knocks me out\".  Used 1 full pump of medication.  Tried it one time on the Achilles.  Achilles pain no change, pain especially at night.  Neuropathy is \"awful\".  Patient requests repeat hemoglobin A1c.      Physical exam  Patient alert, oriented, no acute distress    +2/4 pedal pulses B/L.    Mild LE edema B/L and multiple varicosities.     Light touch intact B/L  No cellulitis noted B/L.   No ulcers, no ecchymosis, no calluses noted B/L  +5/5 muscle strength B/L.    Decreased ankle joint ROM B/L.  Palpable fusiform Achilles tendon noted right.  No palpable gaps in Achilles tendon  Pain upon palpation of the Achilles tendon 2 cm proximal to insertion on the calcaneus right.  Palpable bursa noted right Achilles.  Lab Results   Component Value Date    HGBA1C 9.0 (H) 10/09/2023      Assessment and plan  #1 Achilles tendinitis/tendinosis  Continue in CAM walker  Reviewed x-rays R ankle in detail with patient.  X-rays showed posterior calcaneal spurring along with thickening of the Achilles tendon consistent with tendinosis.  Continue with nonweightbearing range of motion exercises  Tylenol and ice as needed  OTC Voltaren gel to the Achilles tendon every 6 hours as needed  Rx: Physical therapy  Follow-up 4 weeks    #2 Diabetes mellitus with peripheral neuropathy  Patient can try just a half a dose of the topical neuropathic cream at night  Rx: Hemoglobin A1c  Follow-up 4 weeks    "

## 2024-01-10 ENCOUNTER — EVALUATION (OUTPATIENT)
Dept: PHYSICAL THERAPY | Facility: CLINIC | Age: 69
End: 2024-01-10
Payer: COMMERCIAL

## 2024-01-10 DIAGNOSIS — M67.873 ACHILLES TENDINOSIS OF RIGHT ANKLE: ICD-10-CM

## 2024-01-10 PROCEDURE — 97161 PT EVAL LOW COMPLEX 20 MIN: CPT | Mod: GP

## 2024-01-10 PROCEDURE — 97110 THERAPEUTIC EXERCISES: CPT | Mod: GP

## 2024-01-10 ASSESSMENT — COLUMBIA-SUICIDE SEVERITY RATING SCALE - C-SSRS
1. IN THE PAST MONTH, HAVE YOU WISHED YOU WERE DEAD OR WISHED YOU COULD GO TO SLEEP AND NOT WAKE UP?: NO
2. HAVE YOU ACTUALLY HAD ANY THOUGHTS OF KILLING YOURSELF?: NO
6. HAVE YOU EVER DONE ANYTHING, STARTED TO DO ANYTHING, OR PREPARED TO DO ANYTHING TO END YOUR LIFE?: NO

## 2024-01-10 ASSESSMENT — PATIENT HEALTH QUESTIONNAIRE - PHQ9
2. FEELING DOWN, DEPRESSED OR HOPELESS: NOT AT ALL
SUM OF ALL RESPONSES TO PHQ9 QUESTIONS 1 AND 2: 0
1. LITTLE INTEREST OR PLEASURE IN DOING THINGS: NOT AT ALL

## 2024-01-10 NOTE — PROGRESS NOTES
Physical Therapy Evaluation    Patient Name Zeny Scott  MRN: [id number]  Today's Date: 01/10/24  Time Calculation  Start Time: 1110  Stop Time: 1155  Time Calculation (min): 45 min    Insurance: Payor: "[employer]" / Plan: "[employer]" / Product Type: *No Product type* /  $40 copay  -authorization required: no  -number of visits authorized: N/A  -Authorization/certification dates: N/A  Next MD appointment: 2/1/24    Therapy Diagnoses:   1. Achilles tendinosis of right ankle  Referral to Physical Therapy    Follow Up In Physical Therapy          Onset Date: 8/1/23  Referring Provider: Missy Orozco DPM  PT Orders: eval and treat, would benefit from iontophoresis    Assessment:    Impression: R achilles tendonitis presenting with dec ROM/strength/flexibility and limiting function with standing activities. Complicated by L knee which needs TKR     Skilled PT services will aid in advancing functional status and attaining therapy goals.    Problem List:  -activity limitations  -Functional limitations  -Pain R achilles  -decreased  ROM  -decreased strength   -decreased flexibility  -decreased knowledge of HEP  -balance    Goals:  STG 2 wks  Compliant with HEP  Dec pain 25%    LTG by discharge  I HEP  Improve functional outcome score to indicate improved functional mobility  Dec pain 50-75% on pain scale with improved sleep  Inc AROM R ankle WNL with improved gait  Inc R ankle strength 5/5 with improved walking tolerance  Inc LE flexibility WNL  Improve SLS to > 15 sec with reports of no falls    Rehab Potential:  good    Clinical Presentation:    stable/and/or uncomplicated characteristics                                            Level of Complexity: low  Plan:    Therapeutic exercise, Manual therapy, Gait training, Home program instruction and progression, Neuromuscular re-education, Electrical stimulation, Vasopneumatic device + cold, and  "Cryotherapy  Nustep for soft tissue warmup, ROM/strengthening ankle, LE flexibility, CKC activities, balance/proprioceptive activities, gait/stair training, modalities prn , iontophoresis is not a covered service, /[facility]/IF prn    2 x week  until goals met or maximum rehab potential met  Pt is currently scheduled for 4 weeks    Plan of care was designed with input and agreement by the patient    Subjective:    Current Episode of Functional Impairment and/or Pain :  About 6 month hx of R achilles pain with insidious onset  Needs L TKR but no plans to have this at this time  Ortho has denied to do surgery due to high A1C  Was given analgesic cream that she tried 1x but it \"knocked her out\"  Arrived w/o CAM walker-doesn't use-has what she describes as an immobilizer    Pain  Pain assessment 0-10  Pain score: 7  Pain location: R achilles  Type: intermittent sharp pains    Exacerbating Factors: walking, standing  Relieving Factors: rest gives some relief    Current Medical management:     PMHx: Reviewed medical history form with patient and medical screening assessed.   DM, HTN     Medications for pain: ibuprofen/tylenol     Diagnostic Tests: xrays- mild/mod tendinosis    Precautions: moderate fall risk  Reports 3 falls in last 6 months that she feels is due to L knee    Functional Limitations: Sleeping, Walking, Stair negotiation, and Standing    Home Living Situation: lives with family temporarily, steps to basement for laundry  Looking for housing w/o steps    Prior Level of Function better walking tolerance    Patient Stated Goal For Therapy \"fix it\"    Occupation: retired LPN in [employer]    Objective:    Ortho:  Ankle Eval:    Objective:  AROM ankle (PROM)  DF: 2 (5)  PF: 22 (33)  IV: 23 (32)  EV: 7 (12)    Strength ankle  DF: 5  PF: 3+ pain  IV: 3  EV: 3+ pain    subtalar ROM: WNL    Gait: antalgic gait, wide GREER, dec WB RLE with dec heel/toe gait pattern  Step to pattern on stairs    Balance: SLS 10 sec  w/o UE " support    POP just proximal to calcaneus in achilles tendon    Outcome Measures:  Other Measures  Lower Extremity Funtional Score (LEFS): 50     Treatment:  AP 20x  Ankle circles 10x cw/ccw  Towel stretches DF/IV/EV 10 sec 5x   Standing gastroc stretch 10 sec 5x   Standing soleus stretch 10 sec 5x    STM R achilles/PROM 10' supine    Declined CP    Response to treatment: improved knowledge and understanding of condition    Education: Educated on relevant anatomy and expected plan of care  Instructed in initial HEP including reasoning of given exercises and issued written instructions

## 2024-01-12 ENCOUNTER — TREATMENT (OUTPATIENT)
Dept: PHYSICAL THERAPY | Facility: CLINIC | Age: 69
End: 2024-01-12
Payer: COMMERCIAL

## 2024-01-12 DIAGNOSIS — M67.873 ACHILLES TENDINOSIS OF RIGHT ANKLE: ICD-10-CM

## 2024-01-12 PROCEDURE — 97110 THERAPEUTIC EXERCISES: CPT | Mod: GP,CQ

## 2024-01-12 PROCEDURE — 97140 MANUAL THERAPY 1/> REGIONS: CPT | Mod: GP,CQ

## 2024-01-12 NOTE — PROGRESS NOTES
Physical Therapy Treatment Note      Patient Name Zeny Scott  MRN: [id number]  Today's Date: 01/12/24    Time Calculation  Start Time: 0920  Stop Time: 1000  Time Calculation (min): 40 min    Insurance: Payor: [employer] / Plan: [employer] / Product Type: *No Product type* /  $40 copay   Visit #: 2  Date of Onset: 08/01/23  -authorization required: no  -number of visits authorized N/A  -authorization/ certification dates: N/A    General:  Next MD appt: 2/1/24     Therapy Diagnoses:   1. Achilles tendinosis of right ankle  Follow Up In Physical Therapy          Assessment:  skilled intervention: ankle mobility/flexibility exercises and ROM    Patient expressed feeling increased pain in the knee with standing gastroc/soleus stretches; able to modify to performing seated using towel with appropriate stretches felt. Reviewed proper hold times for stretching. Added ankle alphabet to increase flexibility and mobility of the R ankle. Focused on manual intervention this date to decrease R ankle edema, decrease trigger points in the gastrocs, and increase mobility with PROM; displayed improvement in all areas post session.    Patient would continue to benefit from skilled PT to address remaining functional, objective and subjective deficits to allow them to return to full independence with ADLs     Plan:  Next visit add seated BAPS in all planes    Precautions: moderate fall risk  Reports 3 falls in last 6 months that she feels is due to L knee    Subjective:  General:  States she tries to sleep with the R ankle straight in dorsiflexion. Has no pain currently in the R achilles.    Functional Progress:  Less pain, more intermittently    Pain  Pain assessment 0-10  Pain score: nothing currently  Pain location: R achilles region      Compliant with HEP:  yes    Understanding of HEP:  compliant      Objective:      Therapeutic Exercise    15 minutes    see below  **indicates new exercises or progression  NP=not performed    Neuromuscular Re-education:      minutes    See below  **indicates new exercises or progression  NP=not performed    Therapeutic Activity:      Manual  STM/TPR/IASTM R achilles/gastrocs and PROM seated with back elevation of plinth x 10 minutes  25 minutes    Modalities    Electric Stimulation    minutes    Ultrasound    minutes    Vasopneumatic Device    minutes      Gait      minutes    Other     Exercise Log:    AP 20x  Ankle circles 10x2 cw/ccw **  Towel stretches DF/IV/EV 10 sec 5x   Seated gastroc stretch towel 10 sec 5x   Seated soleus stretch towel 10 sec 5x  Ankle alphabet x 1 **    Education:  Exercise technique, postural awareness, exercise progression

## 2024-01-17 ENCOUNTER — TREATMENT (OUTPATIENT)
Dept: PHYSICAL THERAPY | Facility: CLINIC | Age: 69
End: 2024-01-17
Payer: COMMERCIAL

## 2024-01-17 DIAGNOSIS — M67.873 ACHILLES TENDINOSIS OF RIGHT ANKLE: ICD-10-CM

## 2024-01-17 PROCEDURE — 97110 THERAPEUTIC EXERCISES: CPT | Mod: GP

## 2024-01-17 PROCEDURE — 97140 MANUAL THERAPY 1/> REGIONS: CPT | Mod: GP

## 2024-01-17 PROCEDURE — 97112 NEUROMUSCULAR REEDUCATION: CPT | Mod: GP

## 2024-01-17 NOTE — PROGRESS NOTES
Physical Therapy Treatment Note      Patient Name Zeny Scott  MRN: [id number]  Today's Date: 01/17/24  Time Calculation  Start Time: 0945  Stop Time: 1030  Time Calculation (min): 45 min    Insurance: Payor: [employer] / Plan: [employer] / Product Type: *No Product type* /  $40 copay  Visit #: 3  Date of Onset: 08/01/23   -authorization required: no  -number of visits authorized N/A  -authorization/ certification dates: N/A    General:  Next MD appt: 2/1/24     Therapy Diagnoses:   1. Achilles tendinosis of right ankle  Follow Up In Physical Therapy          Assessment:  skilled intervention: exercise progression for proprioception/flexibility, manual for soft tissue restrictions    Patient would continue to benefit from skilled PT to address remaining functional, objective and subjective deficits to allow them to return to full independence with ADLs     Pt had some concerns about performing nustep due to L knee pain but was able to complete  Progressed with flexibility and proprioceptive exercises     Plan:  Attempt standing gastroc and soleus stretches   Side stepping at velasquez bar for balance  Add airex to SLS    Precautions: mod fall risk  Reports 3 falls in last 6 months that she feels is due to L knee     Subjective:  General:  Had a lot of pain yesterday and was unable to do exercises  Felt good after last session    Functional Progress:  No falls  Pain not causing lack of sleep    Pain  Pain assessment 0-10  Pain score:3  Pain location: R achilles    Compliant with HEP:  yes/partial    Understanding of HEP: WNL    Objective:  Therapeutic Exercise  20 minutes  see below  **indicates new exercises or progression  NP=not performed    Neuromuscular Re-education:  10 minutes  See below  **indicates new exercises or progression  NP=not performed      Manual  STM R achilles/gastroc seated with back elevation of plinth    15  minutes    Modalities    Electric Stimulation    minutes    Ultrasound    minutes    Vasopneumatic Device    minutes    Other     Exercise Log:  Nustep L1 5' **  Slantboard stretch (lowest level)10 sec 5x **  AP 20x  Ankle circles 10x2 cw/ccw   Towel stretches DF/IV/EV 10 sec 5x   Seated gastroc stretch towel 10 sec 5x   Seated soleus stretch towel 10 sec 5x  Ankle alphabet x 1   Isometric PF 10 sec 10x **  BAPS seated L2 20 cw/ccw **  SLS RLE 10 sec 10x **    Education:  Instructed in progression of exercises and issued written instructions

## 2024-01-19 ENCOUNTER — LAB (OUTPATIENT)
Dept: LAB | Facility: LAB | Age: 69
End: 2024-01-19
Payer: COMMERCIAL

## 2024-01-19 ENCOUNTER — TREATMENT (OUTPATIENT)
Dept: PHYSICAL THERAPY | Facility: CLINIC | Age: 69
End: 2024-01-19
Payer: COMMERCIAL

## 2024-01-19 DIAGNOSIS — E11.49 TYPE II DIABETES MELLITUS WITH NEUROLOGICAL MANIFESTATIONS (MULTI): ICD-10-CM

## 2024-01-19 DIAGNOSIS — M67.873 ACHILLES TENDINOSIS OF RIGHT ANKLE: ICD-10-CM

## 2024-01-19 LAB
EST. AVERAGE GLUCOSE BLD GHB EST-MCNC: 200 MG/DL
HBA1C MFR BLD: 8.6 %

## 2024-01-19 PROCEDURE — 36415 COLL VENOUS BLD VENIPUNCTURE: CPT

## 2024-01-19 PROCEDURE — 97140 MANUAL THERAPY 1/> REGIONS: CPT | Mod: GP

## 2024-01-19 PROCEDURE — 83036 HEMOGLOBIN GLYCOSYLATED A1C: CPT

## 2024-01-19 PROCEDURE — 97112 NEUROMUSCULAR REEDUCATION: CPT | Mod: GP

## 2024-01-19 PROCEDURE — 97110 THERAPEUTIC EXERCISES: CPT | Mod: GP

## 2024-01-19 NOTE — PROGRESS NOTES
Physical Therapy Treatment Note      Patient Name Zeny Scott  MRN: [id number]  Today's Date: 01/19/24  Time Calculation  Start Time: 1035  Stop Time: 1115  Time Calculation (min): 40 min    Insurance: Payor: "[employer] / Plan: "[employer] / Product Type: *No Product type* /  $40 copay   Visit #: 4  Date of Onset:08/01/23    -authorization required: no    General:  Next MD appt: 2/1/24      Therapy Diagnoses:   1. Achilles tendinosis of right ankle  Follow Up In Physical Therapy          Assessment:  skilled intervention: exercise progression for flexibility, manual for soft tissue rstrictions    Patient would continue to benefit from skilled PT to address remaining functional, objective and subjective deficits to allow them to return to full independence with ADLs     Progressed with balance activities on foam service, dynamic balance and standing calf stretches     Plan:  Ankle tband       Precautions: mod fall risk    Subjective:  General:  Arrives with low pain level  Sore after last visit thinks she did nustep too long and would like to do only 2.5 min    Functional Progress:    Pain  Pain assessment 0-10  Pain score: 1  Pain location: R achilles    Compliant with HEP:  yes    Understanding of HEP: WNL    Objective:  Therapeutic Exercise  20 minutes  see below  **indicates new exercises or progression  NP=not performed    Neuromuscular Re-education:  10 minutes  See below  **indicates new exercises or progression  NP=not performed      Manual  STM R achilles/gastroc seated with back elevation of plinth     10 minutes      Exercise Log:  Nustep L1 5'   Slantboard stretch (lowest level)10 sec 5x not available  AP 20x (HEP)  Ankle circles 10x2 cw/ccw (HEP)  Towel stretches DF/IV/EV 10 sec 5x   Ankle alphabet x 1   Isometric PF 10 sec 10x   BAPS seated L2 20 cw/ccw   SLS RLE 10 sec 10x  airex **  Side stepping 3x **  Heel toe/retro  walk 2x **  Standing gastroc stretch 10 sec 5x **  Standing soleus stretch 10 sec 5x **  Seated toe/heel raises 20x **     Education:  Instructed in progression of exercises

## 2024-01-21 ASSESSMENT — EXTERNAL EXAM - LEFT EYE: OS_EXAM: NORMAL

## 2024-01-21 ASSESSMENT — EXTERNAL EXAM - RIGHT EYE: OD_EXAM: NORMAL

## 2024-01-22 ENCOUNTER — TREATMENT (OUTPATIENT)
Dept: PHYSICAL THERAPY | Facility: CLINIC | Age: 69
End: 2024-01-22
Payer: COMMERCIAL

## 2024-01-22 DIAGNOSIS — M67.873 ACHILLES TENDINOSIS OF RIGHT ANKLE: Primary | ICD-10-CM

## 2024-01-22 PROCEDURE — 97140 MANUAL THERAPY 1/> REGIONS: CPT | Mod: GP

## 2024-01-22 PROCEDURE — 97110 THERAPEUTIC EXERCISES: CPT | Mod: GP

## 2024-01-22 NOTE — PROGRESS NOTES
Last OV 3/16/23 - Dr. Gaona    Diabetes  -HbA1c= 8.6 (1/19/24). No diabetic retinopathy seen on exam. Continue close monitoring of blood glucose, blood pressure, and cholesterol. Plan for annual dilated eye exam.    Combined form of age-related cataract, right eyeH25.811  Combined form of age-related cataract, left eyeH25.812  -Not visually significant at this time. Monitor.     Dry eyes, bilateral  -Patient notes discharge in the morning for several months  -Advised to use warm compresses in the morning to wipe away discharge, baby shampoo to wash lids. Artificial tears PRN  -Patient with some history of allergies, may consider antihistamine in the future as needed. No significant itching at this time.     RPE mottling, bilateral  -Plan to check OCT macula at next comprehensive exam in 1 year.     Hyperopia  Astigmatism  Presbyopia  -Unclear if history of amblyopia OD? - BCVA OD 20/25 in 2023  -MRx found today is different from 2023 exam. Advised to try this Rx and to let me know if any difficulty seeing with new glasses.     No history of intraocular surgery/refractive surgery.   No FH of AMD/glaucoma

## 2024-01-22 NOTE — PROGRESS NOTES
Physical Therapy Treatment Note      Patient Name Zeny Scott  MRN: [id number]  Today's Date: 01/22/24  Time Calculation  Start Time: 1050  Stop Time: 1115  Time Calculation (min): 25 min    Insurance: Payor: [employer] / Plan: [employer] / Product Type: *No Product type* /  $40 copay  Visit #: 5  Date of Onset:  -authorization required: no    General:  Next MD appt: 2/1/24    Therapy Diagnoses:   1. Achilles tendinosis of right ankle  Follow Up In Physical Therapy          Assessment:  skilled intervention: exercise progression for strength, manual for soft tissue restrictions+    Patient would continue to benefit from skilled PT to address remaining functional, objective and subjective deficits to allow them to return to full independence with ADLs     Did well with progression to resistive exercises     Plan:  Resume full program  Airex balance beam  Precautions: mod fall risk    Subjective:  General:  Felt good after massage last visit  Limited treatment due to 20' late arrival    Functional Progress:  Almost fell but caught self and normally hasn't been able to prevent fall    Pain  Pain assessment 0-10  Pain score: 0  Pain location: R achilles    Compliant with HEP:  yes    Understanding of HEP: WNL    Objective:  Therapeutic Exercise  10 minutes  see below  **indicates new exercises or progression  NP=not performed    Neuromuscular Re-education:  5 minutes  See below  **indicates new exercises or progression  NP=not performed      Manual  STM R achilles/gastroc seated with back elevation of plinth        10 minutes    Modalities    Electric Stimulation    minutes    Other     Exercise Log:  Nustep L1 5' NP  Slantboard stretch (lowest level)10 sec 5x not available  AP 20x (HEP)  Ankle circles 10x2 cw/ccw (HEP)  Towel stretches DF/IV/EV 10 sec 5x NP  Ankle alphabet x 1 NP  Isometric PF 10 sec 10x   BAPS seated L2 20 cw/ccw   SLS  RLE 10 sec 10x  airex   Side stepping 3x   Heel toe/retro walk 2x   Standing gastroc stretch 10 sec 5x   Standing soleus stretch 10 sec 5x   Seated toe/heel raises 20x NP  Ankle tband green 4 way 20x **    Education:  Instructed in progression of exercises and issued written instructions  Issued green and blue tband for HEP

## 2024-01-23 ENCOUNTER — APPOINTMENT (OUTPATIENT)
Dept: PHYSICAL THERAPY | Facility: CLINIC | Age: 69
End: 2024-01-23
Payer: COMMERCIAL

## 2024-01-24 ENCOUNTER — OFFICE VISIT (OUTPATIENT)
Dept: DERMATOLOGY | Facility: CLINIC | Age: 69
End: 2024-01-24
Payer: COMMERCIAL

## 2024-01-24 ENCOUNTER — OFFICE VISIT (OUTPATIENT)
Dept: OPHTHALMOLOGY | Facility: CLINIC | Age: 69
End: 2024-01-24
Payer: COMMERCIAL

## 2024-01-24 DIAGNOSIS — L57.8 PHOTOAGING OF SKIN: ICD-10-CM

## 2024-01-24 DIAGNOSIS — L57.0 ACTINIC KERATOSIS: ICD-10-CM

## 2024-01-24 DIAGNOSIS — H52.203 ASTIGMATISM OF BOTH EYES, UNSPECIFIED TYPE: ICD-10-CM

## 2024-01-24 DIAGNOSIS — H35.89 RPE MOTTLING OF MACULA: ICD-10-CM

## 2024-01-24 DIAGNOSIS — H52.4 PRESBYOPIA: ICD-10-CM

## 2024-01-24 DIAGNOSIS — H25.812 COMBINED FORM OF AGE-RELATED CATARACT, LEFT EYE: ICD-10-CM

## 2024-01-24 DIAGNOSIS — H52.03 HYPEROPIA, BILATERAL: ICD-10-CM

## 2024-01-24 DIAGNOSIS — L81.4 LENTIGO: ICD-10-CM

## 2024-01-24 DIAGNOSIS — H25.811 COMBINED FORM OF AGE-RELATED CATARACT, RIGHT EYE: ICD-10-CM

## 2024-01-24 DIAGNOSIS — L85.3 XEROSIS CUTIS: ICD-10-CM

## 2024-01-24 DIAGNOSIS — D22.5 MELANOCYTIC NEVI OF TRUNK: ICD-10-CM

## 2024-01-24 DIAGNOSIS — Z85.828 PERSONAL HISTORY OF OTHER MALIGNANT NEOPLASM OF SKIN: Primary | ICD-10-CM

## 2024-01-24 DIAGNOSIS — H04.123 DRY EYES, BILATERAL: ICD-10-CM

## 2024-01-24 DIAGNOSIS — E11.9 DIABETES MELLITUS WITHOUT COMPLICATION (MULTI): Primary | ICD-10-CM

## 2024-01-24 DIAGNOSIS — L82.1 SEBORRHEIC KERATOSIS: ICD-10-CM

## 2024-01-24 DIAGNOSIS — L21.9 SEBORRHEIC DERMATITIS: ICD-10-CM

## 2024-01-24 PROCEDURE — 3052F HG A1C>EQUAL 8.0%<EQUAL 9.0%: CPT | Performed by: DERMATOLOGY

## 2024-01-24 PROCEDURE — 92014 COMPRE OPH EXAM EST PT 1/>: CPT | Performed by: OPHTHALMOLOGY

## 2024-01-24 PROCEDURE — 1126F AMNT PAIN NOTED NONE PRSNT: CPT | Performed by: DERMATOLOGY

## 2024-01-24 PROCEDURE — 92015 DETERMINE REFRACTIVE STATE: CPT | Performed by: OPHTHALMOLOGY

## 2024-01-24 PROCEDURE — 1036F TOBACCO NON-USER: CPT | Performed by: DERMATOLOGY

## 2024-01-24 PROCEDURE — 1159F MED LIST DOCD IN RCRD: CPT | Performed by: DERMATOLOGY

## 2024-01-24 PROCEDURE — 99214 OFFICE O/P EST MOD 30 MIN: CPT | Performed by: DERMATOLOGY

## 2024-01-24 RX ORDER — KETOCONAZOLE 20 MG/G
CREAM TOPICAL
Qty: 60 G | Refills: 3 | Status: SHIPPED | OUTPATIENT
Start: 2024-01-24

## 2024-01-24 RX ORDER — FLUOROURACIL 50 MG/G
CREAM TOPICAL
Qty: 40 G | Refills: 0 | Status: SHIPPED | OUTPATIENT
Start: 2024-01-24

## 2024-01-24 RX ORDER — TRIAMCINOLONE ACETONIDE 0.25 MG/G
CREAM TOPICAL
Qty: 30 G | Refills: 0 | Status: SHIPPED | OUTPATIENT
Start: 2024-01-24

## 2024-01-24 ASSESSMENT — REFRACTION_WEARINGRX
OD_SPHERE: OTC NVO
OS_SPHERE: OTC NVO

## 2024-01-24 ASSESSMENT — PATIENT GLOBAL ASSESSMENT (PGA): PATIENT GLOBAL ASSESSMENT: PATIENT GLOBAL ASSESSMENT:  2 - MILD

## 2024-01-24 ASSESSMENT — DERMATOLOGY PATIENT ASSESSMENT
DO YOU HAVE ANY NEW OR CHANGING LESIONS: YES
ARE YOU ON BIRTH CONTROL: NO
DO YOU USE A TANNING BED: NO
DO YOU USE SUNSCREEN: OCCASIONALLY
FOR PATIENTS COMING IN FOR A FOLLOW-UP VISIT - HAVE THERE BEEN ANY CHANGES IN YOUR HEALTH SINCE YOUR LAST VISIT: NO
DO YOU HAVE IRREGULAR MENSTRUAL CYCLES: NO
WHERE ARE THESE NEW OR CHANGING LESIONS LOCATED: FACE AND HANDS
ARE YOU TRYING TO GET PREGNANT: NO
ARE YOU AN ORGAN TRANSPLANT RECIPIENT: NO
HAVE YOU HAD OR DO YOU HAVE A STAPH INFECTION: NO
HAVE YOU HAD OR DO YOU HAVE VASCULAR DISEASE: YES

## 2024-01-24 ASSESSMENT — VISUAL ACUITY
OS_SC: 20/25
METHOD: SNELLEN - LINEAR
OD_BAT_MED: 20/30
OS_BAT_MED: 20/20
OD_SC: 20/50+

## 2024-01-24 ASSESSMENT — REFRACTION_MANIFEST
OD_AXIS: 080
OS_SPHERE: +1.75
OD_CYLINDER: -1.75
OD_CYLINDER: -1.75
OD_SPHERE: +2.50
OS_CYLINDER: -1.50
OD_AXIS: 080
OS_SPHERE: +2.50
METHOD_AUTOREFRACTION: 1
OS_AXIS: 090
OD_ADD: +2.50
OS_AXIS: 090
OD_SPHERE: +2.50
OS_CYLINDER: -1.50
OS_ADD: +2.50

## 2024-01-24 ASSESSMENT — CUP TO DISC RATIO
OD_RATIO: 0.25
OS_RATIO: 0.25

## 2024-01-24 ASSESSMENT — DERMATOLOGY QUALITY OF LIFE (QOL) ASSESSMENT
DATE THE QUALITY-OF-LIFE ASSESSMENT WAS COMPLETED: 66863
RATE HOW EMOTIONALLY BOTHERED YOU ARE BY YOUR SKIN PROBLEM (FOR EXAMPLE, WORRY, EMBARRASSMENT, FRUSTRATION): 0 - NEVER BOTHERED
WHAT SINGLE SKIN CONDITION LISTED BELOW IS THE PATIENT ANSWERING THE QUALITY-OF-LIFE ASSESSMENT QUESTIONS ABOUT: NONE OF THE ABOVE
RATE HOW BOTHERED YOU ARE BY EFFECTS OF YOUR SKIN PROBLEMS ON YOUR ACTIVITIES (EG, GOING OUT, ACCOMPLISHING WHAT YOU WANT, WORK ACTIVITIES OR YOUR RELATIONSHIPS WITH OTHERS): 0 - NEVER BOTHERED
ARE THERE EXCLUSIONS OR EXCEPTIONS FOR THE QUALITY OF LIFE ASSESSMENT: NO
RATE HOW BOTHERED YOU ARE BY SYMPTOMS OF YOUR SKIN PROBLEM (EG, ITCHING, STINGING BURNING, HURTING OR SKIN IRRITATION): 6 - ALWAYS BOTHERED

## 2024-01-24 ASSESSMENT — ENCOUNTER SYMPTOMS
NEUROLOGICAL NEGATIVE: 0
GASTROINTESTINAL NEGATIVE: 0
CARDIOVASCULAR NEGATIVE: 0
CONSTITUTIONAL NEGATIVE: 0
RESPIRATORY NEGATIVE: 0
MUSCULOSKELETAL NEGATIVE: 0
HEMATOLOGIC/LYMPHATIC NEGATIVE: 0
ENDOCRINE NEGATIVE: 0
PSYCHIATRIC NEGATIVE: 0
ALLERGIC/IMMUNOLOGIC NEGATIVE: 0
EYES NEGATIVE: 1

## 2024-01-24 ASSESSMENT — SLIT LAMP EXAM - LIDS
COMMENTS: GOOD POSITION, DERMATOCHALASIS
COMMENTS: GOOD POSITION, DERMATOCHALASIS

## 2024-01-24 ASSESSMENT — TONOMETRY
IOP_METHOD: GOLDMANN APPLANATION
OD_IOP_MMHG: 14
OS_IOP_MMHG: 14

## 2024-01-24 ASSESSMENT — ITCH NUMERIC RATING SCALE: HOW SEVERE IS YOUR ITCHING?: 7

## 2024-01-24 NOTE — PROGRESS NOTES
Subjective     Zeny Scott is a 68 y.o. female who presents for the following: Skin Check (Pt here for FBSE. Pt reports hx of BCC and AK. Areas of concern, Face and bilateral hands. ).     Review of Systems:  No other skin or systemic complaints other than what is documented elsewhere in the note.    The following portions of the chart were reviewed this encounter and updated as appropriate:         Skin Cancer History  No skin cancer on file.      Specialty Problems          Dermatology Problems    Actinic keratosis    Angioma    Dermatitis    Hemangioma of skin and subcutaneous tissue    Intertrigo    Lentigines    Melanocytic nevi of other parts of face    Melanocytic nevi of scalp and neck    Melanocytic nevi of trunk    Melanocytic nevi of unspecified lower limb, including hip    Melanocytic nevi of unspecified upper limb, including shoulder    Neoplasm of uncertain behavior of skin    Onychodystrophy    Other melanin hyperpigmentation    Personal history of other malignant neoplasm of skin    Seborrheic dermatitis    Skin changes due to chronic exposure to nonionizing radiation, unspecified    Ear itch    Otomycosis of right ear        Objective   Well appearing patient in no apparent distress; mood and affect are within normal limits.    A full examination was performed including scalp, head, eyes, ears, nose, lips, neck, chest, axillae, abdomen, back, buttocks, bilateral upper extremities, bilateral lower extremities, hands, feet, fingers, toes, fingernails, and toenails. All findings within normal limits unless otherwise noted below.    Assessment/Plan   1. Personal history of other malignant neoplasm of skin  Left Anterior Neck  Well healed scar at site of prior treatment without evidence of recurrence.    There is no evidence of recurrence on clinical examination today, reassurance was provided to the patient. The importance of sun protection was reviewed with the patient including the use of a broad  spectrum sunscreen that protects against both UVA/UVB rays, with ingredients such as Zinc oxide or titanium dioxide, wearing sun protective clothing and sun avoidance. Warning signs of non-melanoma skin cancer were reviewed. ABCDEs of melanoma reviewed. Patient to f/u should they notice any new or changing pre-existing skin lesion    Related Procedures  Follow Up In Dermatology - Established Patient    2. Actinic keratosis (2)  Left Buccal Cheek, Right Parotid Area  Erythematous macules with gritty scale.    Lesions are due to cumulative sun damage over time and are pre-cancerous. They have a risk (although small in majority of patients) of developing into squamous cell carcinoma and therefore treatment recommendations were offered and discussed.   Treatments Discussed include LN2, photodynamic (blue light) therapy & topical chemotherapy creams, risks and benefits of each.     Side effects of 5-FU (fluorouracil) are expected and include redness, crusting, blistering, oozing, pain and irritation with application.     To alleviate side effects can Vaseline or cool compresses to the skin to alleviate side effects.     Apply 5FU 2x daily x 2wks to bilateral lateral cheeks.    After 2 weeks of 5FU, stop 5FU and begin to apply triamcinolone 0.025% cream 2x daily x 1-2 weeks to alleviate effects of redness, pain, irritation that occurs after 5FU therapy.    fluorouracil (Efudex) 5 % cream - Left Buccal Cheek, Right Parotid Area  Apply to spots on the right and left cheeks 2x daily x 2 weeks    triamcinolone (Kenalog) 0.025 % cream - Left Buccal Cheek, Right Parotid Area  Apply to cheeks 2x daily x 2 weeks after finishing 5fu treatment    Related Procedures  Follow Up In Dermatology - Established Patient    3. Seborrheic dermatitis  Chest - Medial (Center), Left Alar Crease, Right Ala Nasi  Erythema with overlying greasy scale. Erythematous macerated patch of the medial chest    The chronic and intermittently flaring nature  of this skin condition was discussed with patient today.   This is due to a yeast that everyone has on their skin that results in redness, dryness and in some patients itching.    Various treatment options were reviewed including topical antifungals and topical steroids depending upon severity and symptoms. Patient advised we cannot cure this condition, but it can be controlled.     Begin the following treatment:  -ketoconazole 2% cream 2x daily    Topical corticosteroid in reserve if not helpful.    Related Medications  ketoconazole (NIZOral) 2 % cream  Apply to nose and chest 2x daily as needed    4. Photoaging of skin  Mottled pigmentation with telangiectasias and brown reticular macules in sun exposed areas of the body.    The risk of chronic, cumulative sun damage and risk of development of skin cancer was reviewed today.   The importance of sun protection was reviewed: including the use of a broad spectrum sunscreen that protects against both UVA/UVB rays, with ingredients such as Zinc oxide or titanium dioxide, wearing sun protective clothing and sun avoidance. We reviewed the warning signs of non-melanoma skin cancer and ABCDEs of melanoma  Please follow up should you notice any new or changing pre-existing skin lesion.    Related Procedures  Follow Up In Dermatology - Established Patient    5. Xerosis cutis  Fine, ashy, pale to white scale diffusely over skin.  Few excoriated papules with crust    Dry skin is common, often worse during the dry months of the year and may also worsen as we age.  A gentle skin care regimen includes:  -washing with a mild soap without fragrance such as Dove for sensitive skin, Cetaphil or Cerave.  -pat dry after washing and then apply a thick moisturizer such as Cerave cream or Cetaphil cream. Creams are thicker than lotions and often do a better job of restoring the skin barrier.      6. Lentigo  Scattered tan macules in sun-exposed areas.    These are benign skin lesions due  to sun exposure. They will darken in response to sun exposure. They should be monitored for change in size, shape or color.  These lesions can be treated cosmetically with topical creams, liquid nitrogen and a variety of lasers.    7. Seborrheic keratosis  Brown, tan waxy macules and stuck on appearing papules and plaques    The benign nature of these skin lesions reviewed, reassure provided and no further treatment needed at this time.   These lesions can be removed, if symptomatic (itching, bleeding, rubbing on clothing, painful), otherwise removal is considered cosmetic.     8. Melanocytic nevi of trunk  Left Abdomen (side) - Upper  Symmetric dome shaped tan papul    Clinically benign appearing nevi, no treatment is necessary.  The importance of sun protection was reviewed: including the use of a broad spectrum sunscreen that protects against both UVA/UVB rays, with ingredients such as Zinc oxide or titanium dioxide, wearing sun protective clothing and sun avoidance.   ABCDEs of melanoma reviewed.  Please follow up should you notice any new or changing pre-existing skin lesion.          1. Personal history of other malignant neoplasm of skin  Left Anterior Neck  Well healed scar at site of prior treatment without evidence of recurrence.    There is no evidence of recurrence on clinical examination today, reassurance was provided to the patient. The importance of sun protection was reviewed with the patient including the use of a broad spectrum sunscreen that protects against both UVA/UVB rays, with ingredients such as Zinc oxide or titanium dioxide, wearing sun protective clothing and sun avoidance. Warning signs of non-melanoma skin cancer were reviewed. ABCDEs of melanoma reviewed. Patient to f/u should they notice any new or changing pre-existing skin lesion    Related Procedures  Follow Up In Dermatology - Established Patient    2. Actinic keratosis (2)  Left Buccal Cheek, Right Parotid Area  Erythematous  macules with gritty scale.    Lesions are due to cumulative sun damage over time and are pre-cancerous. They have a risk (although small in majority of patients) of developing into squamous cell carcinoma and therefore treatment recommendations were offered and discussed.   Treatments Discussed include LN2, photodynamic (blue light) therapy & topical chemotherapy creams, risks and benefits of each.     Side effects of 5-FU (fluorouracil) are expected and include redness, crusting, blistering, oozing, pain and irritation with application.     To alleviate side effects can Vaseline or cool compresses to the skin to alleviate side effects.     Apply 5FU 2x daily x 2wks to bilateral lateral cheeks.    After 2 weeks of 5FU, stop 5FU and begin to apply triamcinolone 0.025% cream 2x daily x 1-2 weeks to alleviate effects of redness, pain, irritation that occurs after 5FU therapy.    fluorouracil (Efudex) 5 % cream - Left Buccal Cheek, Right Parotid Area  Apply to spots on the right and left cheeks 2x daily x 2 weeks    triamcinolone (Kenalog) 0.025 % cream - Left Buccal Cheek, Right Parotid Area  Apply to cheeks 2x daily x 2 weeks after finishing 5fu treatment    Related Procedures  Follow Up In Dermatology - Established Patient    3. Seborrheic dermatitis  Chest - Medial (Center), Left Alar Crease, Right Ala Nasi  Erythema with overlying greasy scale. Erythematous macerated patch of the medial chest    The chronic and intermittently flaring nature of this skin condition was discussed with patient today.   This is due to a yeast that everyone has on their skin that results in redness, dryness and in some patients itching.    Various treatment options were reviewed including topical antifungals and topical steroids depending upon severity and symptoms. Patient advised we cannot cure this condition, but it can be controlled.     Begin the following treatment:  -ketoconazole 2% cream 2x daily    Topical corticosteroid in  reserve if not helpful.    Related Medications  ketoconazole (NIZOral) 2 % cream  Apply to nose and chest 2x daily as needed    4. Photoaging of skin  Mottled pigmentation with telangiectasias and brown reticular macules in sun exposed areas of the body.    The risk of chronic, cumulative sun damage and risk of development of skin cancer was reviewed today.   The importance of sun protection was reviewed: including the use of a broad spectrum sunscreen that protects against both UVA/UVB rays, with ingredients such as Zinc oxide or titanium dioxide, wearing sun protective clothing and sun avoidance. We reviewed the warning signs of non-melanoma skin cancer and ABCDEs of melanoma  Please follow up should you notice any new or changing pre-existing skin lesion.    Related Procedures  Follow Up In Dermatology - Established Patient    5. Xerosis cutis  Fine, ashy, pale to white scale diffusely over skin.  Few excoriated papules with crust    Dry skin is common, often worse during the dry months of the year and may also worsen as we age.  A gentle skin care regimen includes:  -washing with a mild soap without fragrance such as Dove for sensitive skin, Cetaphil or Cerave.  -pat dry after washing and then apply a thick moisturizer such as Cerave cream or Cetaphil cream. Creams are thicker than lotions and often do a better job of restoring the skin barrier.      6. Lentigo  Scattered tan macules in sun-exposed areas.    These are benign skin lesions due to sun exposure. They will darken in response to sun exposure. They should be monitored for change in size, shape or color.  These lesions can be treated cosmetically with topical creams, liquid nitrogen and a variety of lasers.    7. Seborrheic keratosis  Brown, tan waxy macules and stuck on appearing papules and plaques    The benign nature of these skin lesions reviewed, reassure provided and no further treatment needed at this time.   These lesions can be removed, if  symptomatic (itching, bleeding, rubbing on clothing, painful), otherwise removal is considered cosmetic.     8. Melanocytic nevi of trunk  Left Abdomen (side) - Upper  Symmetric dome shaped tan papul    Clinically benign appearing nevi, no treatment is necessary.  The importance of sun protection was reviewed: including the use of a broad spectrum sunscreen that protects against both UVA/UVB rays, with ingredients such as Zinc oxide or titanium dioxide, wearing sun protective clothing and sun avoidance.   ABCDEs of melanoma reviewed.  Please follow up should you notice any new or changing pre-existing skin lesion.      Follow up in 6 months to recheck areas (Aks) treated with 5FU

## 2024-01-26 ENCOUNTER — TREATMENT (OUTPATIENT)
Dept: PHYSICAL THERAPY | Facility: CLINIC | Age: 69
End: 2024-01-26
Payer: COMMERCIAL

## 2024-01-26 DIAGNOSIS — M67.873 ACHILLES TENDINOSIS OF RIGHT ANKLE: ICD-10-CM

## 2024-01-26 PROCEDURE — 97112 NEUROMUSCULAR REEDUCATION: CPT | Mod: GP

## 2024-01-26 PROCEDURE — 97110 THERAPEUTIC EXERCISES: CPT | Mod: GP

## 2024-01-26 NOTE — PROGRESS NOTES
Physical Therapy Treatment Note      Patient Name Zeny Scott  MRN: [id number]  Today's Date: 01/26/24  Time Calculation  Start Time: 0915  Stop Time: 1000  Time Calculation (min): 45 min    Insurance: Payor: [employer] / Plan: [employer] / Product Type: *No Product type* /  $40 copay   Visit #: 6  Date of Onset: :08/01/23     -authorization required: no    General:  Next MD appt: 2/1/24     Therapy Diagnoses:   1. Achilles tendinosis of right ankle  Follow Up In Physical Therapy          Assessment:  skilled intervention: exercise progression for balance, manual for soft tissue restrictions, reasmt    Patient would continue to benefit from skilled PT to address remaining functional, objective and subjective deficits to allow them to return to full independence with ADLs     Some difficulty inc CKC due to L knee pain due to advanced OA  Progressed to standing with calf raises and to foam surface for dynamic standing activities  ROM/strength improving  Soft tissue restrictions in R achilles dec    Plan:  Step ups    Precautions: mod fall risk    Subjective:  General:  Foot is good but knee hurts  Some achilles pain yesterday    Functional Progress:  No falls  Standing tolerance limited by L knee pain    Pain  Pain assessment 0-10  Pain score: 0  Pain location: R ankle    Compliant with HEP:  yes    Understanding of HEP: WNL    Objective:  Therapeutic Exercise  25 minutes  see below  **indicates new exercises or progression  NP=not performed    Neuromuscular Re-education:  10 minutes  See below  **indicates new exercises or progression  NP=not performed      Manual  STM R achilles/gastroc seated with back elevation of plinth       10 minutes    Modalities    Electric Stimulation    minutes    Ultrasound    minutes      Other   AROM ankle (PROM)  DF: 8  PF: 42  IV: 31  EV: 10     Strength ankle  DF: 5  PF: 4   IV: 4-  EV: 4     Exercise  Log:  Nustep L1 5' NP  Slantboard stretch (lowest level)10 sec 5x not available  Towel stretches DF/IV/EV 10 sec 5x   Ankle alphabet x 1 NP  Isometric PF 10 sec 10x   BAPS seated L2 20 cw/ccw   SLS RLE 10 sec 10x  airex   Standing gastroc stretch 10 sec 5x   Standing soleus stretch 10 sec 5x   Standing  toe/heel raises 10x2 **  Ankle tband green 4 way 20x   Airex balance beam  -side stepping 5x **  -heel/toe walk 3x **    Education:  Instructed in progression of exercises

## 2024-01-29 ENCOUNTER — APPOINTMENT (OUTPATIENT)
Dept: PHYSICAL THERAPY | Facility: CLINIC | Age: 69
End: 2024-01-29
Payer: COMMERCIAL

## 2024-01-29 ENCOUNTER — DOCUMENTATION (OUTPATIENT)
Dept: PHYSICAL THERAPY | Facility: CLINIC | Age: 69
End: 2024-01-29
Payer: COMMERCIAL

## 2024-01-30 ENCOUNTER — OFFICE VISIT (OUTPATIENT)
Dept: OBSTETRICS AND GYNECOLOGY | Facility: CLINIC | Age: 69
End: 2024-01-30
Payer: COMMERCIAL

## 2024-01-30 ENCOUNTER — TELEPHONE (OUTPATIENT)
Dept: OBSTETRICS AND GYNECOLOGY | Facility: CLINIC | Age: 69
End: 2024-01-30

## 2024-01-30 VITALS
BODY MASS INDEX: 37.02 KG/M2 | HEIGHT: 63 IN | SYSTOLIC BLOOD PRESSURE: 143 MMHG | DIASTOLIC BLOOD PRESSURE: 88 MMHG | WEIGHT: 208.9 LBS

## 2024-01-30 DIAGNOSIS — R39.9 UTI SYMPTOMS: ICD-10-CM

## 2024-01-30 DIAGNOSIS — N76.0 VAGINITIS AND VULVOVAGINITIS: Primary | ICD-10-CM

## 2024-01-30 PROCEDURE — 87186 SC STD MICRODIL/AGAR DIL: CPT

## 2024-01-30 PROCEDURE — 87205 SMEAR GRAM STAIN: CPT

## 2024-01-30 PROCEDURE — 3077F SYST BP >= 140 MM HG: CPT | Performed by: NURSE PRACTITIONER

## 2024-01-30 PROCEDURE — 3079F DIAST BP 80-89 MM HG: CPT | Performed by: NURSE PRACTITIONER

## 2024-01-30 PROCEDURE — 1160F RVW MEDS BY RX/DR IN RCRD: CPT | Performed by: NURSE PRACTITIONER

## 2024-01-30 PROCEDURE — 99213 OFFICE O/P EST LOW 20 MIN: CPT | Performed by: NURSE PRACTITIONER

## 2024-01-30 PROCEDURE — 1126F AMNT PAIN NOTED NONE PRSNT: CPT | Performed by: NURSE PRACTITIONER

## 2024-01-30 PROCEDURE — 3052F HG A1C>EQUAL 8.0%<EQUAL 9.0%: CPT | Performed by: NURSE PRACTITIONER

## 2024-01-30 PROCEDURE — 87086 URINE CULTURE/COLONY COUNT: CPT

## 2024-01-30 PROCEDURE — 1159F MED LIST DOCD IN RCRD: CPT | Performed by: NURSE PRACTITIONER

## 2024-01-30 PROCEDURE — 1036F TOBACCO NON-USER: CPT | Performed by: NURSE PRACTITIONER

## 2024-01-30 RX ORDER — FLUCONAZOLE 150 MG/1
150 TABLET ORAL ONCE
Qty: 2 TABLET | Refills: 0 | Status: SHIPPED | OUTPATIENT
Start: 2024-01-30 | End: 2024-01-30

## 2024-01-30 RX ORDER — NYSTATIN AND TRIAMCINOLONE ACETONIDE 100000; 1 [USP'U]/G; MG/G
OINTMENT TOPICAL 2 TIMES DAILY PRN
Qty: 15 G | Refills: 0 | Status: SHIPPED | OUTPATIENT
Start: 2024-01-30 | End: 2024-05-29 | Stop reason: SDUPTHER

## 2024-01-30 NOTE — PROGRESS NOTES
Physical Therapy                 Therapy Communication Note    Patient Name: Zeny Scott  MRN: [id number]  Today's Date: 1/30/2024     Discipline: Physical Therapy    Missed Visit Reason:      Missed Time: Cancel    Comment:over slept

## 2024-01-30 NOTE — PROGRESS NOTES
"Zeny is a 68 year old female who presents for vaginal pruritis, burning and urinary urgency iwht cloudy odorous urine for 2 months.  Pt last seen 2021 for annual, h/o DM2 had been on Jardience, went off. Sees PCP for this  , pt is a nurse    Vaginal discharge: none, no odor   Itching: yes on labias   Dyspareunia: n/a not since 2009  Fever/chills: no, but feels run down  Abdominal pain: no  Bladder: Negative for dysuria or frequency, but pos urgency and hard to start stream  Bowel: No blood in stool, pain with BM, tarry stool, persistent diarrhea or constipation  Any new sexual partners or concern for STD exposure: no  Any history of STDs: no; h/o HSV and HPV  Does your partner have any new complaints: n/a  Are you currently taking any medications to treat vaginitis: none  Do you use feminine sprays, douches or deodorants: no  Menopausal age 49yo, no HRT  Last pap: 2021 normal but pos HPV      Assessment:  /88   Ht 1.6 m (5' 3\")   Wt 94.8 kg (208 lb 14.4 oz)   BMI 37.00 kg/m²   GENERAL: Well developed, well nourished, in no apparent distress  ABDOMEN: soft, non-tender and no masses  PELVIC: external genitalia normal, normal Bartholin's glands, urethra, Stiles's glands, no vulvar lesions, no cervical lesions, good vaginal support, physiologic discharge present, normal appearing perineal body and perianal region, well estrogenized*  BIMANUAL: uterus normal size, shape and consistency, no adnexal masses, non-tender and no cervical motion tenderness.  RECTOVAGINAL: rectovaginal exam negative for any masses or nodularity. deferred.    Plan:  Vaginitis: culture obtained  Suspect yeast infection, will start diflucan oral Rx, and topical mycolog twice daily PRN. Discussed sitz baths as well.  Enc pt to decrease pad use when able, use barriers such as coconut oil  STD screening: declines, not SA  Urine culture sent: +urgency,  unable to fully void at times, urine odor and sometimes frothy, will treat if " needed.  Any new medications given to the patient have been explained as to directions, reasons for prescribing and side effects. Perineal hygiene and safe sex were discussed with the patient    RTC annual exam and PRN

## 2024-01-31 ENCOUNTER — TREATMENT (OUTPATIENT)
Dept: PHYSICAL THERAPY | Facility: CLINIC | Age: 69
End: 2024-01-31
Payer: COMMERCIAL

## 2024-01-31 DIAGNOSIS — M67.873 ACHILLES TENDINOSIS OF RIGHT ANKLE: ICD-10-CM

## 2024-01-31 LAB
BACTERIAL VAGINOSIS VAG-IMP: NORMAL
CLUE CELLS VAG LPF-#/AREA: NORMAL /[LPF]
NUGENT SCORE: 4
YEAST VAG WET PREP-#/AREA: NORMAL

## 2024-01-31 PROCEDURE — 97112 NEUROMUSCULAR REEDUCATION: CPT | Mod: GP

## 2024-01-31 PROCEDURE — 97110 THERAPEUTIC EXERCISES: CPT | Mod: GP

## 2024-01-31 PROCEDURE — 97140 MANUAL THERAPY 1/> REGIONS: CPT | Mod: GP

## 2024-01-31 NOTE — PROGRESS NOTES
Physical Therapy Treatment Note      Patient Name Zeny Scott  MRN: [id number]  Today's Date: 01/31/24  Time Calculation  Start Time: 0850  Stop Time: 0930  Time Calculation (min): 40 min    Insurance: Payor: [employer] / Plan: [employer] / Product Type: *No Product type* /  $40 copay    Visit #: 7  Date of Onset:08/01/23    -authorization required: no    General:  Next MD appt: 2/1/24      Therapy Diagnoses:   1. Achilles tendinosis of right ankle  Follow Up In Physical Therapy          Assessment:  skilled intervention: exercise progression for function, manual for soft tissue restrictions     Patient would continue to benefit from skilled PT to address remaining functional, objective and subjective deficits to allow them to return to full independence with ADLs   Arrived with antalgic gait and dec WB LLE due to knee pain/modified program due to knee pain  Progressed with step exercises     Dec soft tissue tightness at achilles however calf with inc edema/tightness today    Plan:  1 more visit then discharge to HEP    Precautions: mod fall risk    Subjective:  General:  Knee is bothering her but arrives with no ankle pain  Functional Progress:  No falls but still losing balance    Pain  Pain assessment 0-10  Pain score:0  Pain location: R ankle    Compliant with HEP:  yes    Understanding of HEP: WNL    Objective:  Therapeutic Exercise  20 minutes  see below  **indicates new exercises or progression  NP=not performed    Neuromuscular Re-education:  10 minutes  See below  **indicates new exercises or progression  NP=not performed    Manual Therapy:    STM R achilles/gastroc seated with back elevation of plinth     10 minutes      Modalities    CP to L knee during manual    Other     Exercise Log:  Nustep L1 6'  Slantboard stretch (lowest level)10 sec 5x NP  Towel stretches DF/IV/EV 10 sec 5x   Isometric PF 10 sec 10x   BAPS seated L2  "20 cw/ccw   SLS RLE 10 sec 10x  airex   Standing gastroc stretch 10 sec 5x   Standing soleus stretch 10 sec 5x   Standing  toe/heel raises 10x2   Ankle tband green 4 way 20x   Airex balance beam NP due to L knee pain  -side stepping 5x   -heel/toe walk 3x   Step ups 6\" 10x F/L **    Education:  Instructed in progression of exercises  Some v/c's for exercise performance/hold times  Re-instructed ankle tband           "

## 2024-02-01 ENCOUNTER — APPOINTMENT (OUTPATIENT)
Dept: PODIATRY | Facility: CLINIC | Age: 69
End: 2024-02-01
Payer: COMMERCIAL

## 2024-02-01 LAB — BACTERIA UR CULT: ABNORMAL

## 2024-02-01 RX ORDER — NITROFURANTOIN 25; 75 MG/1; MG/1
100 CAPSULE ORAL 2 TIMES DAILY
Qty: 14 CAPSULE | Refills: 0 | Status: SHIPPED | OUTPATIENT
Start: 2024-02-01 | End: 2024-02-08

## 2024-02-05 ENCOUNTER — TREATMENT (OUTPATIENT)
Dept: PHYSICAL THERAPY | Facility: CLINIC | Age: 69
End: 2024-02-05
Payer: COMMERCIAL

## 2024-02-05 DIAGNOSIS — M67.873 ACHILLES TENDINOSIS OF RIGHT ANKLE: ICD-10-CM

## 2024-02-05 PROCEDURE — 97110 THERAPEUTIC EXERCISES: CPT | Mod: GP

## 2024-02-05 PROCEDURE — 97112 NEUROMUSCULAR REEDUCATION: CPT | Mod: GP

## 2024-02-05 PROCEDURE — 97140 MANUAL THERAPY 1/> REGIONS: CPT | Mod: GP

## 2024-02-05 NOTE — PROGRESS NOTES
Physical Therapy Treatment Note      Patient Name Zeny Scott  MRN: [id number]  Today's Date: 02/05/24  Time Calculation  Start Time: 0955  Stop Time: 1035  Time Calculation (min): 40 min    Insurance: Payor: [employer] / Plan: [employer] / Product Type: *No Product type* /  $40 copay    Visit #: 8  Date of Onset: :08/01/23     -authorization required: no    General:  Next MD appt: none    Therapy Diagnoses:   1. Achilles tendinosis of right ankle  Follow Up In Physical Therapy          Assessment:  skilled intervention: reasmt, education for HEP    LTG   I HEP (met)  Improve functional outcome score to indicate improved functional mobility (met)  Dec pain 50-75% on pain scale with improved sleep (met)  Inc AROM R ankle WNL with improved gait (met)  Inc R ankle strength 5/5 with improved walking tolerance (met)  Inc LE flexibility WNL (met)  Improve SLS to > 15 sec with reports of no falls (met)    Plan:  Discharge to [facility] for report period 1/10/24-2/5/24    Precautions: mod fall risk    Subjective:  General:  Cont to do well  Arrived 10' late  Made appt with ortho  No inc foot pain with walking long distance at [facility] last week-L knee did bother her    Functional Progress:  No falls    Pain  Pain assessment 0-10  Pain score: 0  Pain location: R achilles    Compliant with HEP:  yes    Understanding of HEP: WNL    Objective:  Therapeutic Exercise  20 minutes  see below  **indicates new exercises or progression  NP=not performed    Neuromuscular Re-education:  10 minutes  See below  **indicates new exercises or progression  NP=not performed    Manual Therapy:    STM R achilles/gastroc seated with back elevation of plinth      10 minutes      Modalities      Other   AROM ankle (PROM)  DF: 8  PF: 50  IV: 35  EV: 15     Strength ankle  DF: 5  PF: 5  IV: 5-  EV: 5    Exercise Log:  Nustep L1 6'  Slantboard stretch (lowest level)10 sec 5x  "NP  Towel stretches DF/IV/EV 10 sec 5x   Isometric PF 10 sec 10x   BAPS seated L2 20 cw/ccw   SLS RLE 10 sec 10x  airex   Standing gastroc stretch 10 sec 5x   Standing soleus stretch 10 sec 5x   Standing  toe/heel raises 10x2   Ankle tband green 4 way 20x (HEP)  Airex balance beam   -side stepping 5x   -heel/toe walk 3x   Step ups 6\" 10x F/L     Education:  Review of HEP and progression    Outcome Measures:  Other Measures  Lower Extremity Funtional Score (LEFS): 48       "

## 2024-02-12 ENCOUNTER — HOSPITAL ENCOUNTER (OUTPATIENT)
Dept: RADIOLOGY | Facility: CLINIC | Age: 69
Discharge: HOME | End: 2024-02-12
Payer: COMMERCIAL

## 2024-02-12 ENCOUNTER — HOSPITAL ENCOUNTER (OUTPATIENT)
Dept: RADIOLOGY | Facility: HOSPITAL | Age: 69
Discharge: HOME | End: 2024-02-12
Payer: COMMERCIAL

## 2024-02-12 ENCOUNTER — LAB (OUTPATIENT)
Dept: LAB | Facility: LAB | Age: 69
End: 2024-02-12
Payer: COMMERCIAL

## 2024-02-12 DIAGNOSIS — R06.02 SHORTNESS OF BREATH: ICD-10-CM

## 2024-02-12 DIAGNOSIS — R53.83 FATIGUE: ICD-10-CM

## 2024-02-12 DIAGNOSIS — R10.9 ABDOMINAL PAIN: ICD-10-CM

## 2024-02-12 DIAGNOSIS — R10.9 UNSPECIFIED ABDOMINAL PAIN: ICD-10-CM

## 2024-02-12 DIAGNOSIS — R10.13 EPIGASTRIC ABDOMINAL PAIN: ICD-10-CM

## 2024-02-12 DIAGNOSIS — E55.9 VITAMIN D DEFICIENCY, UNSPECIFIED: Primary | ICD-10-CM

## 2024-02-12 LAB
25(OH)D3 SERPL-MCNC: 51 NG/ML (ref 30–100)
ALBUMIN SERPL BCP-MCNC: 4.2 G/DL (ref 3.4–5)
ALP SERPL-CCNC: 155 U/L (ref 33–136)
ALT SERPL W P-5'-P-CCNC: 26 U/L (ref 7–45)
AMYLASE SERPL-CCNC: 34 U/L (ref 29–103)
ANION GAP SERPL CALC-SCNC: 13 MMOL/L (ref 10–20)
AST SERPL W P-5'-P-CCNC: 24 U/L (ref 9–39)
BASOPHILS # BLD AUTO: 0.01 X10*3/UL (ref 0–0.1)
BASOPHILS NFR BLD AUTO: 0.2 %
BILIRUB SERPL-MCNC: 0.9 MG/DL (ref 0–1.2)
BUN SERPL-MCNC: 26 MG/DL (ref 6–23)
CALCIUM SERPL-MCNC: 9.5 MG/DL (ref 8.6–10.3)
CHLORIDE SERPL-SCNC: 102 MMOL/L (ref 98–107)
CHOLEST SERPL-MCNC: 129 MG/DL (ref 0–199)
CHOLESTEROL/HDL RATIO: 3.6
CO2 SERPL-SCNC: 27 MMOL/L (ref 21–32)
CREAT SERPL-MCNC: 0.7 MG/DL (ref 0.5–1.05)
EGFRCR SERPLBLD CKD-EPI 2021: >90 ML/MIN/1.73M*2
EOSINOPHIL # BLD AUTO: 0.07 X10*3/UL (ref 0–0.7)
EOSINOPHIL NFR BLD AUTO: 1.4 %
ERYTHROCYTE [DISTWIDTH] IN BLOOD BY AUTOMATED COUNT: 13.4 % (ref 11.5–14.5)
EST. AVERAGE GLUCOSE BLD GHB EST-MCNC: 194 MG/DL
GLIADIN PEPTIDE IGA SER IA-ACNC: <1 U/ML
GLUCOSE SERPL-MCNC: 243 MG/DL (ref 74–99)
HBA1C MFR BLD: 8.4 %
HCT VFR BLD AUTO: 47.3 % (ref 36–46)
HDLC SERPL-MCNC: 36.1 MG/DL
HGB BLD-MCNC: 15.3 G/DL (ref 12–16)
IMM GRANULOCYTES # BLD AUTO: 0.01 X10*3/UL (ref 0–0.7)
IMM GRANULOCYTES NFR BLD AUTO: 0.2 % (ref 0–0.9)
LDLC SERPL CALC-MCNC: 68 MG/DL
LIPASE SERPL-CCNC: 225 U/L (ref 9–82)
LYMPHOCYTES # BLD AUTO: 1.31 X10*3/UL (ref 1.2–4.8)
LYMPHOCYTES NFR BLD AUTO: 25.6 %
MCH RBC QN AUTO: 26.3 PG (ref 26–34)
MCHC RBC AUTO-ENTMCNC: 32.3 G/DL (ref 32–36)
MCV RBC AUTO: 81 FL (ref 80–100)
MONOCYTES # BLD AUTO: 0.33 X10*3/UL (ref 0.1–1)
MONOCYTES NFR BLD AUTO: 6.5 %
NEUTROPHILS # BLD AUTO: 3.38 X10*3/UL (ref 1.2–7.7)
NEUTROPHILS NFR BLD AUTO: 66.1 %
NON HDL CHOLESTEROL: 93 MG/DL (ref 0–149)
NRBC BLD-RTO: 0 /100 WBCS (ref 0–0)
PLATELET # BLD AUTO: 217 X10*3/UL (ref 150–450)
POTASSIUM SERPL-SCNC: 4.1 MMOL/L (ref 3.5–5.3)
PROT SERPL-MCNC: 7.1 G/DL (ref 6.4–8.2)
RBC # BLD AUTO: 5.81 X10*6/UL (ref 4–5.2)
SODIUM SERPL-SCNC: 138 MMOL/L (ref 136–145)
TRIGL SERPL-MCNC: 127 MG/DL (ref 0–149)
TSH SERPL-ACNC: <0.01 MIU/L (ref 0.44–3.98)
TTG IGA SER IA-ACNC: <1 U/ML
VLDL: 25 MG/DL (ref 0–40)
WBC # BLD AUTO: 5.1 X10*3/UL (ref 4.4–11.3)

## 2024-02-12 PROCEDURE — 85025 COMPLETE CBC W/AUTO DIFF WBC: CPT

## 2024-02-12 PROCEDURE — 83516 IMMUNOASSAY NONANTIBODY: CPT

## 2024-02-12 PROCEDURE — 83690 ASSAY OF LIPASE: CPT

## 2024-02-12 PROCEDURE — 74018 RADEX ABDOMEN 1 VIEW: CPT

## 2024-02-12 PROCEDURE — 76705 ECHO EXAM OF ABDOMEN: CPT | Performed by: STUDENT IN AN ORGANIZED HEALTH CARE EDUCATION/TRAINING PROGRAM

## 2024-02-12 PROCEDURE — 71046 X-RAY EXAM CHEST 2 VIEWS: CPT

## 2024-02-12 PROCEDURE — 76705 ECHO EXAM OF ABDOMEN: CPT

## 2024-02-12 PROCEDURE — 82150 ASSAY OF AMYLASE: CPT

## 2024-02-12 PROCEDURE — 84443 ASSAY THYROID STIM HORMONE: CPT

## 2024-02-12 PROCEDURE — 80053 COMPREHEN METABOLIC PANEL: CPT

## 2024-02-12 PROCEDURE — 36415 COLL VENOUS BLD VENIPUNCTURE: CPT

## 2024-02-12 PROCEDURE — 80061 LIPID PANEL: CPT

## 2024-02-12 PROCEDURE — 83036 HEMOGLOBIN GLYCOSYLATED A1C: CPT

## 2024-02-12 PROCEDURE — 82306 VITAMIN D 25 HYDROXY: CPT

## 2024-02-15 ENCOUNTER — TELEPHONE (OUTPATIENT)
Dept: CARDIOLOGY | Facility: CLINIC | Age: 69
End: 2024-02-15
Payer: COMMERCIAL

## 2024-02-15 NOTE — TELEPHONE ENCOUNTER
Pt called requesting a sooner ov, pt scheduled for 5/1/24 with Dr. Tee.    Pt states she had recent labs done by PCP. Pt c/o discomfort to right side of chest that radiates to scapula, some SOB with or w/o activity. Pt has not checked home BP/HR recently.    Pt concerned because Lipase level elevated (225) and concerned because she has hx of A-fib.    Current meds: Spironolactone 25mg daily, Eliquis 5mg daily, Metoprolol tartrate 25mg daily. Pt NOT taking Torsemide.    Per records, pt's PCP is obtaining labs for gluten intolerance, celiac disease, abdominal discomfort.    Move up ov? Or any testing needed?    Please advise. Thanks.

## 2024-02-20 ENCOUNTER — HOSPITAL ENCOUNTER (OUTPATIENT)
Dept: RADIOLOGY | Facility: CLINIC | Age: 69
Discharge: HOME | End: 2024-02-20
Payer: COMMERCIAL

## 2024-02-20 ENCOUNTER — OFFICE VISIT (OUTPATIENT)
Dept: ORTHOPEDIC SURGERY | Facility: CLINIC | Age: 69
End: 2024-02-20
Payer: COMMERCIAL

## 2024-02-20 DIAGNOSIS — M17.0 ARTHRITIS OF BOTH KNEES: Primary | ICD-10-CM

## 2024-02-20 DIAGNOSIS — G89.29 CHRONIC PAIN OF BOTH KNEES: ICD-10-CM

## 2024-02-20 DIAGNOSIS — M25.561 CHRONIC PAIN OF BOTH KNEES: ICD-10-CM

## 2024-02-20 DIAGNOSIS — M25.562 CHRONIC PAIN OF BOTH KNEES: ICD-10-CM

## 2024-02-20 PROCEDURE — 73562 X-RAY EXAM OF KNEE 3: CPT | Mod: 50

## 2024-02-20 PROCEDURE — 73562 X-RAY EXAM OF KNEE 3: CPT | Mod: BILATERAL PROCEDURE | Performed by: RADIOLOGY

## 2024-02-20 PROCEDURE — 3048F LDL-C <100 MG/DL: CPT | Performed by: ORTHOPAEDIC SURGERY

## 2024-02-20 PROCEDURE — 1126F AMNT PAIN NOTED NONE PRSNT: CPT | Performed by: ORTHOPAEDIC SURGERY

## 2024-02-20 PROCEDURE — 1036F TOBACCO NON-USER: CPT | Performed by: ORTHOPAEDIC SURGERY

## 2024-02-20 PROCEDURE — 3052F HG A1C>EQUAL 8.0%<EQUAL 9.0%: CPT | Performed by: ORTHOPAEDIC SURGERY

## 2024-02-20 PROCEDURE — 99214 OFFICE O/P EST MOD 30 MIN: CPT | Performed by: ORTHOPAEDIC SURGERY

## 2024-02-20 PROCEDURE — 1160F RVW MEDS BY RX/DR IN RCRD: CPT | Performed by: ORTHOPAEDIC SURGERY

## 2024-02-20 PROCEDURE — 1159F MED LIST DOCD IN RCRD: CPT | Performed by: ORTHOPAEDIC SURGERY

## 2024-02-20 NOTE — PROGRESS NOTES
68-year-old is seen with left and right knee pain.  She has been having persistent severe sharp shooting pain in the left knee and a intermittent moderate throbbing discomfort in the right knee.  She takes Tylenol and Motrin.  She uses a cane or walker.  Cortisone injection has not provided relief and has caused an increase in her blood sugars.  She is on Eliquis for atrial fibrillation.  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  She has been working on better blood glucose control and has had slight improvement.  She is planning to see a endocrinologist.    Pleasant and no acute distress. Walks with a antalgic gait. Stands with varus alignment of both knees. Right knee range of motion is 5-110°. There is a mild effusion. The knee is stable to varus and valgus stress Lachman and posterior drawer. There is generalized tenderness. Left knee range of motion is 5-110°. There is a mild effusion. The knee is stable to varus and valgus stress Lachman and posterior drawer. There is generalized tenderness. Both lower extremities are well perfused the skin is intact and muscle tone is adequate.    Multiple x-ray views of the left knee and multiple x-ray views of the right knee are personally reviewed and there are advanced degenerative changes involving the left knee with joint space narrowing and osteophyte formation subchondral sclerosis and cystic change.  There are moderate degenerative changes involving the right knee with joint space narrowing and osteophyte formation.    A detailed discussion about knee arthritis was done.  She has very advanced degenerative disease involving the left knee and has not improved with conservative treatment.  She has had physical therapy in the past and will continue with an exercise program.  She can use Tylenol as needed.  She needs to avoid NSAIDs as she is on Eliquis.  She will see the endocrinologist and work on better blood glucose control.  Her most recent  hemoglobin A1c was 8.4.  She will continue also with conservative treatment for the right knee.  Discussion was done about knee replacement and increased risks with elevated blood glucose levels.

## 2024-02-21 ENCOUNTER — OFFICE VISIT (OUTPATIENT)
Dept: CARDIOLOGY | Facility: CLINIC | Age: 69
End: 2024-02-21
Payer: COMMERCIAL

## 2024-02-21 VITALS
OXYGEN SATURATION: 95 % | SYSTOLIC BLOOD PRESSURE: 130 MMHG | WEIGHT: 205 LBS | HEART RATE: 76 BPM | DIASTOLIC BLOOD PRESSURE: 80 MMHG | BODY MASS INDEX: 36.31 KG/M2

## 2024-02-21 DIAGNOSIS — R06.09 EXERTIONAL DYSPNEA: ICD-10-CM

## 2024-02-21 DIAGNOSIS — I50.32 CHRONIC DIASTOLIC (CONGESTIVE) HEART FAILURE (MULTI): ICD-10-CM

## 2024-02-21 DIAGNOSIS — I25.119 CORONARY ARTERY DISEASE INVOLVING NATIVE CORONARY ARTERY OF NATIVE HEART WITH ANGINA PECTORIS (CMS-HCC): ICD-10-CM

## 2024-02-21 DIAGNOSIS — R60.0 LEG EDEMA: ICD-10-CM

## 2024-02-21 DIAGNOSIS — I48.20 CHRONIC ATRIAL FIBRILLATION (MULTI): ICD-10-CM

## 2024-02-21 DIAGNOSIS — I10 PRIMARY HYPERTENSION: ICD-10-CM

## 2024-02-21 DIAGNOSIS — R07.89 CHEST TIGHTNESS: Primary | ICD-10-CM

## 2024-02-21 PROBLEM — N81.4 UTERINE PROLAPSE: Status: ACTIVE | Noted: 2024-02-21

## 2024-02-21 PROBLEM — H93.13 TINNITUS OF BOTH EARS: Status: ACTIVE | Noted: 2024-02-21

## 2024-02-21 PROBLEM — N76.0 VULVOVAGINITIS: Status: ACTIVE | Noted: 2024-02-21

## 2024-02-21 PROBLEM — K74.60 HEPATIC CIRRHOSIS (MULTI): Status: ACTIVE | Noted: 2023-09-06

## 2024-02-21 PROBLEM — H91.90 HEARING LOSS: Status: ACTIVE | Noted: 2024-02-21

## 2024-02-21 PROBLEM — L85.3 ASTEATOSIS CUTIS: Status: ACTIVE | Noted: 2024-02-21

## 2024-02-21 PROBLEM — I48.11 LONGSTANDING PERSISTENT ATRIAL FIBRILLATION (MULTI): Status: RESOLVED | Noted: 2023-10-25 | Resolved: 2024-02-21

## 2024-02-21 PROBLEM — R94.39 ABNORMAL NUCLEAR STRESS TEST: Status: RESOLVED | Noted: 2023-10-25 | Resolved: 2024-02-21

## 2024-02-21 PROBLEM — M76.61 ACHILLES TENDINITIS OF RIGHT LOWER EXTREMITY: Status: ACTIVE | Noted: 2024-02-21

## 2024-02-21 PROBLEM — M21.629 TAILOR'S BUNION: Status: ACTIVE | Noted: 2024-02-21

## 2024-02-21 PROBLEM — Z86.16 PERSONAL HISTORY OF COVID-19: Status: ACTIVE | Noted: 2023-01-17

## 2024-02-21 PROCEDURE — 1160F RVW MEDS BY RX/DR IN RCRD: CPT | Performed by: INTERNAL MEDICINE

## 2024-02-21 PROCEDURE — 1159F MED LIST DOCD IN RCRD: CPT | Performed by: INTERNAL MEDICINE

## 2024-02-21 PROCEDURE — 99214 OFFICE O/P EST MOD 30 MIN: CPT | Performed by: INTERNAL MEDICINE

## 2024-02-21 PROCEDURE — 3048F LDL-C <100 MG/DL: CPT | Performed by: INTERNAL MEDICINE

## 2024-02-21 PROCEDURE — 1036F TOBACCO NON-USER: CPT | Performed by: INTERNAL MEDICINE

## 2024-02-21 PROCEDURE — 3052F HG A1C>EQUAL 8.0%<EQUAL 9.0%: CPT | Performed by: INTERNAL MEDICINE

## 2024-02-21 PROCEDURE — 1126F AMNT PAIN NOTED NONE PRSNT: CPT | Performed by: INTERNAL MEDICINE

## 2024-02-21 PROCEDURE — 3075F SYST BP GE 130 - 139MM HG: CPT | Performed by: INTERNAL MEDICINE

## 2024-02-21 PROCEDURE — 3079F DIAST BP 80-89 MM HG: CPT | Performed by: INTERNAL MEDICINE

## 2024-02-21 RX ORDER — ATORVASTATIN CALCIUM 40 MG/1
TABLET, FILM COATED ORAL
COMMUNITY
Start: 2022-02-11

## 2024-02-21 RX ORDER — REGADENOSON 0.08 MG/ML
0.4 INJECTION, SOLUTION INTRAVENOUS
Status: CANCELLED | OUTPATIENT
Start: 2024-02-21

## 2024-02-21 ASSESSMENT — ENCOUNTER SYMPTOMS: DYSPNEA ON EXERTION: 1

## 2024-02-21 NOTE — PATIENT INSTRUCTIONS
For endocrinology see Dr. Mishel Sampson at [facility] [phone]

## 2024-02-21 NOTE — PROGRESS NOTES
Subjective   Zeny Scott is a 68 y.o. female.    Chief Complaint:  Fatigue, shortness of breath.  Midsternal chest discomfort    HPI    She has been experiencing increasing fatigue shortness of breath dyspnea with exertion.  She has rather extreme fatigue where she sleeps a good part of the day.  Also has midsternal chest discomfort.  The pleuritic discomfort that she on her previous examination has essentially resolved.  Denies palpitations or tachycardia.  Has  degenerative arthritis of the left knee.  This has limited her activity levels.    Cardiac catheterization performed on 2022 demonstrated 30 to 40% left anterior descending coronary artery stenosis with a normal circumflex and a normal right coronary artery. Medical therapy was recommended.     Her diagnosis of coronary artery disease is also based on a positive calcium score of 676 consistent with the presence of extensive atherosclerotic coronary artery disease.     Her past cardiac history significant for the presence of atrial fibrillation. In  she presented with atrial fibrillation and spontaneously converted. In 2019 she had an EKG which demonstrated atrial fibrillation.      Risk factors for coronary disease include hypertension, diabetes, and a positive family history of premature coronary artery disease.     She has a history of cirrhosis of the liver. She's also had a cholecystectomy. Has a history of elevated liver enzymes on statin therapy.     She is . Has 5 children one of which .  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      Allergies  Medication    · oxycodone   Rash; 2018; Recorded By: Letty Chavez; 2020 9:30:55 AM   · Percocet TABS   Recorded By: Radha Roberts; 2015 9:50:08 AM  NonMedication    · Nickel   Recorded By: Kade Savage; 10/7/2022 4:23:42 PM     Family History  Mother    · Family history of Alzheimer's disease (V17.2)  (Z82.0)  Father    · Family history of cardiac disorder (V17.49) (Z82.49)   · Family history of kidney disease (V18.69) (Z84.1)   · Family history of kidney disease (V18.69) (Z84.1)     Social History  Problems    · Never smoker    Review of Systems   Constitutional: Positive for malaise/fatigue.   Cardiovascular:  Positive for chest pain and dyspnea on exertion.   Musculoskeletal:  Positive for arthritis and joint pain.   All other systems reviewed and are negative.      Visit Vitals  /80 (BP Location: Left arm, Patient Position: Sitting)   Pulse 76   Wt 93 kg (205 lb)   SpO2 95%   BMI 36.31 kg/m²   Smoking Status Never   BSA 2.03 m²        Objective     Constitutional:       Appearance: Not in distress.   Neck:      Vascular: JVD normal.   Pulmonary:      Breath sounds: Normal breath sounds.   Cardiovascular:      Normal rate. Regular rhythm. Normal S1. Normal S2.       Murmurs: There is a grade 1/6 systolic murmur.      No gallop.    Pulses:     Intact distal pulses.   Edema:     Peripheral edema present.     Pretibial: bilateral 1+ edema of the pretibial area.     Ankle: bilateral 1+ edema of the ankle.  Abdominal:      General: There is no distension.      Palpations: Abdomen is soft.   Neurological:      Mental Status: Alert.         Lab Review:   Lab Results   Component Value Date     02/12/2024    K 4.1 02/12/2024     02/12/2024    CO2 27 02/12/2024    BUN 26 (H) 02/12/2024    CREATININE 0.70 02/12/2024    GLUCOSE 243 (H) 02/12/2024    CALCIUM 9.5 02/12/2024     Lab Results   Component Value Date    CHOL 129 02/12/2024    TRIG 127 02/12/2024    HDL 36.1 02/12/2024       Assessment:    1.  Coronary artery disease.  Having symptoms.  Has not had a stress imaging study in several years.  Will proceed with a Lexiscan thallium study.  Because of degenerative arthritis she is unable to walk on a treadmill.    2.  Hypertension.  Blood pressure is normal.  Initial blood pressure is mildly  elevated.    3.  Hyperlipidemia.  Most recent LDL 68.    4.  Fatigue.  Has poor sleep patterns.  May have obstructive sleep apnea.  Did have a sleep study done a few years ago.    Needs follow-up with endocrinology.  Will refer to

## 2024-02-22 ENCOUNTER — OFFICE VISIT (OUTPATIENT)
Dept: PODIATRY | Facility: CLINIC | Age: 69
End: 2024-02-22
Payer: COMMERCIAL

## 2024-02-22 DIAGNOSIS — M67.873 ACHILLES TENDINOSIS OF RIGHT ANKLE: Primary | ICD-10-CM

## 2024-02-22 DIAGNOSIS — M79.671 RIGHT FOOT PAIN: ICD-10-CM

## 2024-02-22 PROCEDURE — 1160F RVW MEDS BY RX/DR IN RCRD: CPT | Performed by: PODIATRIST

## 2024-02-22 PROCEDURE — 1159F MED LIST DOCD IN RCRD: CPT | Performed by: PODIATRIST

## 2024-02-22 PROCEDURE — 3048F LDL-C <100 MG/DL: CPT | Performed by: PODIATRIST

## 2024-02-22 PROCEDURE — 1126F AMNT PAIN NOTED NONE PRSNT: CPT | Performed by: PODIATRIST

## 2024-02-22 PROCEDURE — 99213 OFFICE O/P EST LOW 20 MIN: CPT | Performed by: PODIATRIST

## 2024-02-22 PROCEDURE — 1036F TOBACCO NON-USER: CPT | Performed by: PODIATRIST

## 2024-02-22 PROCEDURE — 3052F HG A1C>EQUAL 8.0%<EQUAL 9.0%: CPT | Performed by: PODIATRIST

## 2024-02-22 NOTE — PROGRESS NOTES
Chief Complaint   Patient presents with    Achilles Pain     Patient is here today for a follow up on achilles tendon pain.Patient completed PT and the lump when away, but came out.  Patient is not wearing boot or supportive shoes and is not using Voltaren gel. Using Motrin and Gabapentin for pain.        F/U R Achilles pain.  Initially physical therapy helped with the pain.  Patient felt like the bump that was on the Achilles tendon did improve but now has increased in size.  Patient is starting to have pain that wakes her up at night in the Achilles tendon.  Patient has stopped using the cam walker.  Patient stopped using topical Voltaren gel.  Patient does take gabapentin and Motrin for pain.    Physical exam  Patient alert, oriented, no acute distress    +2/4 pedal pulses B/L.    Mild LE edema B/L and multiple varicosities.    Light touch intact B/L  No cellulitis noted B/L.   No ulcers, no ecchymosis, no calluses noted B/L  +5/5 muscle strength B/L.    Decreased ankle joint ROM B/L.  Palpable fusiform Achilles tendon noted right, pain on palpation, no noticeable change since last visit.  No palpable gaps in Achilles tendon R  Pain upon palpation of the Achilles tendon 2 cm proximal to insertion on the calcaneus right remains.  Palpable bursa right Achilles remains.  Lab Results   Component Value Date    HGBA1C 8.4 (H) 02/12/2024      Assessment and plan  #1 Achilles tendinosis and bursa  Discussed returning to cam walker during times of increased pain  Continue with stretching as instructed by physical therapy  Continue to ice as needed  Briefly discussed Achilles tendon surgery including a long period of rehabilitation and periods of nonweightbearing   Patient is also currently having chronic left knee pain did discuss that the left leg would be have to bear all the weight during the postoperative period for the right leg  Follow-up after MRI  Will refer out for surgical consultation of Achilles tendon    #2  Diabetes mellitus with peripheral neuropathy  Reviewed Hemoglobin A1c  Discussed in detail with patient that glucose needs to be well controlled for any elective surgeries planned.

## 2024-02-23 ENCOUNTER — TELEPHONE (OUTPATIENT)
Dept: ORTHOPEDIC SURGERY | Facility: CLINIC | Age: 69
End: 2024-02-23
Payer: COMMERCIAL

## 2024-02-23 NOTE — TELEPHONE ENCOUNTER
Patient would like to know if you feel a medial unweighting knee brace would be beneficial for her left knee.

## 2024-03-01 DIAGNOSIS — M17.12 PRIMARY OSTEOARTHRITIS OF LEFT KNEE: ICD-10-CM

## 2024-03-05 ENCOUNTER — HOSPITAL ENCOUNTER (OUTPATIENT)
Dept: RADIOLOGY | Facility: CLINIC | Age: 69
Discharge: HOME | End: 2024-03-05
Payer: COMMERCIAL

## 2024-03-05 ENCOUNTER — HOSPITAL ENCOUNTER (OUTPATIENT)
Dept: CARDIOLOGY | Facility: CLINIC | Age: 69
Discharge: HOME | End: 2024-03-05
Payer: COMMERCIAL

## 2024-03-05 ENCOUNTER — OFFICE VISIT (OUTPATIENT)
Dept: CARDIOLOGY | Facility: CLINIC | Age: 69
End: 2024-03-05
Payer: COMMERCIAL

## 2024-03-05 VITALS
DIASTOLIC BLOOD PRESSURE: 84 MMHG | WEIGHT: 205 LBS | BODY MASS INDEX: 36.32 KG/M2 | OXYGEN SATURATION: 97 % | HEIGHT: 63 IN | HEART RATE: 78 BPM | SYSTOLIC BLOOD PRESSURE: 144 MMHG

## 2024-03-05 DIAGNOSIS — I50.32 CHRONIC DIASTOLIC (CONGESTIVE) HEART FAILURE (MULTI): ICD-10-CM

## 2024-03-05 DIAGNOSIS — N76.0 VULVOVAGINITIS: ICD-10-CM

## 2024-03-05 DIAGNOSIS — R07.89 CHEST TIGHTNESS: ICD-10-CM

## 2024-03-05 DIAGNOSIS — Z86.16 PERSONAL HISTORY OF COVID-19: Primary | ICD-10-CM

## 2024-03-05 DIAGNOSIS — K85.90 ACUTE PANCREATITIS, UNSPECIFIED COMPLICATION STATUS, UNSPECIFIED PANCREATITIS TYPE (HHS-HCC): ICD-10-CM

## 2024-03-05 DIAGNOSIS — I50.32 CHRONIC DIASTOLIC (CONGESTIVE) HEART FAILURE (MULTI): Primary | ICD-10-CM

## 2024-03-05 DIAGNOSIS — H52.00 HYPEROPIA WITH REGULAR ASTIGMATISM: ICD-10-CM

## 2024-03-05 DIAGNOSIS — I25.119 CORONARY ARTERY DISEASE INVOLVING NATIVE CORONARY ARTERY OF NATIVE HEART WITH ANGINA PECTORIS (CMS-HCC): ICD-10-CM

## 2024-03-05 DIAGNOSIS — H52.229 HYPEROPIA WITH REGULAR ASTIGMATISM: ICD-10-CM

## 2024-03-05 DIAGNOSIS — R10.13 EPIGASTRIC PAIN: Primary | ICD-10-CM

## 2024-03-05 DIAGNOSIS — I25.10 ATHEROSCLEROTIC HEART DISEASE OF NATIVE CORONARY ARTERY WITHOUT ANGINA PECTORIS: ICD-10-CM

## 2024-03-05 PROCEDURE — 3077F SYST BP >= 140 MM HG: CPT | Performed by: INTERNAL MEDICINE

## 2024-03-05 PROCEDURE — 1159F MED LIST DOCD IN RCRD: CPT | Performed by: INTERNAL MEDICINE

## 2024-03-05 PROCEDURE — 78452 HT MUSCLE IMAGE SPECT MULT: CPT | Performed by: INTERNAL MEDICINE

## 2024-03-05 PROCEDURE — 1160F RVW MEDS BY RX/DR IN RCRD: CPT | Performed by: INTERNAL MEDICINE

## 2024-03-05 PROCEDURE — 3052F HG A1C>EQUAL 8.0%<EQUAL 9.0%: CPT | Performed by: INTERNAL MEDICINE

## 2024-03-05 PROCEDURE — 1036F TOBACCO NON-USER: CPT | Performed by: INTERNAL MEDICINE

## 2024-03-05 PROCEDURE — 3079F DIAST BP 80-89 MM HG: CPT | Performed by: INTERNAL MEDICINE

## 2024-03-05 PROCEDURE — 3048F LDL-C <100 MG/DL: CPT | Performed by: INTERNAL MEDICINE

## 2024-03-05 PROCEDURE — 1126F AMNT PAIN NOTED NONE PRSNT: CPT | Performed by: INTERNAL MEDICINE

## 2024-03-05 PROCEDURE — 93017 CV STRESS TEST TRACING ONLY: CPT

## 2024-03-05 PROCEDURE — 2500000004 HC RX 250 GENERAL PHARMACY W/ HCPCS (ALT 636 FOR OP/ED): Performed by: INTERNAL MEDICINE

## 2024-03-05 PROCEDURE — 99213 OFFICE O/P EST LOW 20 MIN: CPT | Performed by: INTERNAL MEDICINE

## 2024-03-05 PROCEDURE — 78452 HT MUSCLE IMAGE SPECT MULT: CPT

## 2024-03-05 RX ORDER — REGADENOSON 0.08 MG/ML
0.4 INJECTION, SOLUTION INTRAVENOUS
Status: COMPLETED | OUTPATIENT
Start: 2024-03-05 | End: 2024-03-05

## 2024-03-05 RX ADMIN — REGADENOSON 0.4 MG: 0.08 INJECTION, SOLUTION INTRAVENOUS at 11:56

## 2024-03-05 NOTE — PROGRESS NOTES
Subjective   Zeny Scott is a 68 y.o. female.    Chief Complaint:  Follow-up coronary artery disease.  Chest pain    HPI    On her last visit she has experienced increased fatigue shortness of breath and dyspnea with exertion.  Also described midsternal chest discomfort.  This is a pressure-like sensation in the mid chest area.  We elected to proceed with a Lexiscan thallium study to determine whether reversible ischemia is present    Cardiac catheterization performed on 2022 demonstrated 30 to 40% left anterior descending coronary artery stenosis with a normal circumflex and a normal right coronary artery. Medical therapy was recommended.     Her diagnosis of coronary artery disease is also based on a positive calcium score of 676 consistent with the presence of extensive atherosclerotic coronary artery disease.     Her past cardiac history significant for the presence of atrial fibrillation. In  she presented with atrial fibrillation and spontaneously converted. In 2019 she had an EKG which demonstrated atrial fibrillation.      Risk factors for coronary disease include hypertension, diabetes, and a positive family history of premature coronary artery disease.     She has a history of cirrhosis of the liver. She's also had a cholecystectomy. Has a history of elevated liver enzymes on statin therapy.     She is . Has 5 children one of which .  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      Allergies  Medication    · oxycodone   Rash; 2018; Recorded By: Letty Chavez; 2020 9:30:55 AM   · Percocet TABS   Recorded By: Radha Roberts; 2015 9:50:08 AM  NonMedication    · Nickel   Recorded By: Kade Savage; 10/7/2022 4:23:42 PM     Family History  Mother    · Family history of Alzheimer's disease (V17.2) (Z82.0)  Father    · Family history of cardiac disorder (V17.49) (Z82.49)   · Family history of kidney disease  "(V18.69) (Z84.1)   · Family history of kidney disease (V18.69) (Z84.1)     Social History  Problems    · Never smoker     Review of Systems   Constitutional: Positive for malaise/fatigue.   Cardiovascular:  Positive for chest pain and dyspnea on exertion.   Musculoskeletal:  Positive for arthritis and joint pain.   All other systems reviewed and are negative.      Visit Vitals  /84 (BP Location: Right arm, Patient Position: Sitting)   Pulse 78   Ht 1.6 m (5' 3\")   Wt 93 kg (205 lb)   SpO2 97%   BMI 36.31 kg/m²   Smoking Status Never   BSA 2.03 m²        Objective     Constitutional:       Appearance: Not in distress.   Neck:      Vascular: JVD normal.   Pulmonary:      Breath sounds: Normal breath sounds.   Cardiovascular:      Normal rate. Regular rhythm. Normal S1. Normal S2.       Murmurs: There is no murmur.      No gallop.    Pulses:     Intact distal pulses.   Edema:     Peripheral edema absent.   Abdominal:      General: There is no distension.      Palpations: Abdomen is soft.   Neurological:      Mental Status: Alert.         Lab Review:   Lab Results   Component Value Date     02/12/2024    K 4.1 02/12/2024     02/12/2024    CO2 27 02/12/2024    BUN 26 (H) 02/12/2024    CREATININE 0.70 02/12/2024    GLUCOSE 243 (H) 02/12/2024    CALCIUM 9.5 02/12/2024       Assessment:    1.  Coronary artery disease.  Today's stress thallium study shows no evidence of ischemia with a normal left ventricular ejection fraction of 72%.  Our plan is to continue medical management.  Will use sublingual nitroglycerin for her chest pain.    2.  Hyperlipidemia.  Cholesterol 129, HDL 36, LDL 68.    3.  Hypertension.  Initial blood pressures were somewhat elevated.  She is very anxious over the results of the test.    4.  Fatigue.  May have some sleep apnea components.  Also needs to see an endocrinologist for her thyroid issues and for her blood sugars.  "

## 2024-03-05 NOTE — PATIENT INSTRUCTIONS
Your stress test and heart scans look normal.    If you develop more chest discomfort and chest pains, call us.  We may at that point proceed with a coronary CT angiogram.

## 2024-03-09 DIAGNOSIS — M79.2 NEUROPATHIC PAIN: Primary | ICD-10-CM

## 2024-03-09 DIAGNOSIS — K21.9 GASTROESOPHAGEAL REFLUX DISEASE, UNSPECIFIED WHETHER ESOPHAGITIS PRESENT: Primary | ICD-10-CM

## 2024-03-12 RX ORDER — OMEPRAZOLE 40 MG/1
40 CAPSULE, DELAYED RELEASE ORAL DAILY
Qty: 30 CAPSULE | Refills: 0 | Status: SHIPPED | OUTPATIENT
Start: 2024-03-12 | End: 2024-04-10

## 2024-03-18 ENCOUNTER — HOSPITAL ENCOUNTER (OUTPATIENT)
Dept: RADIOLOGY | Facility: CLINIC | Age: 69
Discharge: HOME | End: 2024-03-18
Payer: COMMERCIAL

## 2024-03-18 DIAGNOSIS — M67.873 ACHILLES TENDINOSIS OF RIGHT ANKLE: ICD-10-CM

## 2024-03-18 PROCEDURE — 73721 MRI JNT OF LWR EXTRE W/O DYE: CPT | Mod: RT

## 2024-03-18 PROCEDURE — 73721 MRI JNT OF LWR EXTRE W/O DYE: CPT | Mod: RIGHT SIDE | Performed by: STUDENT IN AN ORGANIZED HEALTH CARE EDUCATION/TRAINING PROGRAM

## 2024-03-19 RX ORDER — GABAPENTIN 300 MG/1
300 CAPSULE ORAL NIGHTLY
Qty: 30 CAPSULE | Refills: 0 | Status: SHIPPED | OUTPATIENT
Start: 2024-03-19 | End: 2024-05-21

## 2024-03-20 ENCOUNTER — TELEPHONE (OUTPATIENT)
Dept: NEUROLOGY | Facility: CLINIC | Age: 69
End: 2024-03-20
Payer: COMMERCIAL

## 2024-03-21 ENCOUNTER — TELEPHONE VISIT (OUTPATIENT)
Dept: PODIATRY | Facility: HOSPITAL | Age: 69
End: 2024-03-21
Payer: COMMERCIAL

## 2024-03-21 DIAGNOSIS — M79.671 RIGHT FOOT PAIN: ICD-10-CM

## 2024-03-21 DIAGNOSIS — M67.873 ACHILLES TENDINOSIS OF RIGHT ANKLE: Primary | ICD-10-CM

## 2024-03-21 PROCEDURE — 99441 PR PHYS/QHP TELEPHONE EVALUATION 5-10 MIN: CPT | Performed by: PODIATRIST

## 2024-03-22 ENCOUNTER — TELEPHONE (OUTPATIENT)
Dept: PODIATRY | Facility: HOSPITAL | Age: 69
End: 2024-03-22
Payer: COMMERCIAL

## 2024-03-22 DIAGNOSIS — M67.873 ACHILLES TENDINOSIS OF RIGHT ANKLE: Primary | ICD-10-CM

## 2024-03-22 NOTE — PROGRESS NOTES
Follow-up Achilles pain R.  Spoke with patient by phone regarding MRI results.  Results were discussed in detail.  All questions asked and answered.  Achilles pain remains.  Patient was instructed return to the CAM walker to protect the Achilles tendon.  Patient will be referred to orthopedic foot and ankle for possible surgical intervention of the Achilles pain.  Patient is agreeable to this plan.     === 03/18/24 ===     MR ANKLE RIGHT WO CONTRAST     - Impression -  1.  There is severe Achilles tendinosis, with an area of minimal low  grade intrasubstance tearing suspected involving its avascular zone.  2. Mild peroneus brevis tendinosis is noted without tear.  3. Chronic sprains of the anterior talofibular, calcaneofibular, and  deltoid ligaments are noted.  4. Diffuse mild articular cartilage loss of the midfoot and ankle is  noted.     I personally reviewed the images/study and I agree with the findings  as stated. This study was interpreted at [facility], [place], [place].     Signed by: Jeffery Peters 3/18/2024 12:09 PM  Dictation workstation:   [id number]

## 2024-03-22 NOTE — TELEPHONE ENCOUNTER
Follow-up Achilles pain R.  Spoke with patient by phone regarding MRI results.  Results were discussed in detail.  All questions asked and answered.  Achilles pain remains.  Patient was instructed return to the CAM walker to protect the Achilles tendon.  Patient will be referred to orthopedic foot and ankle for possible surgical intervention of the Achilles pain.  Patient is agreeable to this plan.    === 03/18/24 ===    MR ANKLE RIGHT WO CONTRAST    - Impression -  1.  There is severe Achilles tendinosis, with an area of minimal low  grade intrasubstance tearing suspected involving its avascular zone.  2. Mild peroneus brevis tendinosis is noted without tear.  3. Chronic sprains of the anterior talofibular, calcaneofibular, and  deltoid ligaments are noted.  4. Diffuse mild articular cartilage loss of the midfoot and ankle is  noted.    I personally reviewed the images/study and I agree with the findings  as stated. This study was interpreted at [facility], [place], [place].    Signed by: Jeffery Peters 3/18/2024 12:09 PM  Dictation workstation:   [id number]

## 2024-03-26 DIAGNOSIS — I10 PRIMARY HYPERTENSION: ICD-10-CM

## 2024-03-27 ENCOUNTER — LAB (OUTPATIENT)
Dept: LAB | Facility: LAB | Age: 69
End: 2024-03-27
Payer: COMMERCIAL

## 2024-03-27 DIAGNOSIS — I10 PRIMARY HYPERTENSION: ICD-10-CM

## 2024-03-27 LAB
ANION GAP SERPL CALC-SCNC: 15 MMOL/L (ref 10–20)
BUN SERPL-MCNC: 17 MG/DL (ref 6–23)
CALCIUM SERPL-MCNC: 8.9 MG/DL (ref 8.6–10.3)
CHLORIDE SERPL-SCNC: 103 MMOL/L (ref 98–107)
CO2 SERPL-SCNC: 25 MMOL/L (ref 21–32)
CREAT SERPL-MCNC: 0.66 MG/DL (ref 0.5–1.05)
EGFRCR SERPLBLD CKD-EPI 2021: >90 ML/MIN/1.73M*2
GLUCOSE SERPL-MCNC: 195 MG/DL (ref 74–99)
POTASSIUM SERPL-SCNC: 4.2 MMOL/L (ref 3.5–5.3)
SODIUM SERPL-SCNC: 139 MMOL/L (ref 136–145)

## 2024-03-27 PROCEDURE — 80048 BASIC METABOLIC PNL TOTAL CA: CPT

## 2024-03-27 PROCEDURE — 36415 COLL VENOUS BLD VENIPUNCTURE: CPT

## 2024-03-29 ENCOUNTER — HOSPITAL ENCOUNTER (OUTPATIENT)
Dept: RADIOLOGY | Facility: HOSPITAL | Age: 69
Discharge: HOME | End: 2024-03-29
Payer: COMMERCIAL

## 2024-03-29 DIAGNOSIS — R10.13 EPIGASTRIC PAIN: ICD-10-CM

## 2024-03-29 DIAGNOSIS — K85.90 ACUTE PANCREATITIS, UNSPECIFIED COMPLICATION STATUS, UNSPECIFIED PANCREATITIS TYPE (HHS-HCC): ICD-10-CM

## 2024-03-29 PROCEDURE — 74160 CT ABDOMEN W/CONTRAST: CPT

## 2024-03-29 PROCEDURE — 74160 CT ABDOMEN W/CONTRAST: CPT | Performed by: RADIOLOGY

## 2024-03-29 PROCEDURE — 2550000001 HC RX 255 CONTRASTS: Performed by: INTERNAL MEDICINE

## 2024-03-29 RX ADMIN — IOHEXOL 75 ML: 350 INJECTION, SOLUTION INTRAVENOUS at 15:33

## 2024-04-06 DIAGNOSIS — K21.9 GASTROESOPHAGEAL REFLUX DISEASE, UNSPECIFIED WHETHER ESOPHAGITIS PRESENT: ICD-10-CM

## 2024-04-10 RX ORDER — OMEPRAZOLE 40 MG/1
40 CAPSULE, DELAYED RELEASE ORAL DAILY
Qty: 30 CAPSULE | Refills: 0 | Status: SHIPPED | OUTPATIENT
Start: 2024-04-10 | End: 2024-05-22

## 2024-04-11 ENCOUNTER — OFFICE VISIT (OUTPATIENT)
Dept: ORTHOPEDIC SURGERY | Facility: CLINIC | Age: 69
End: 2024-04-11
Payer: COMMERCIAL

## 2024-04-11 DIAGNOSIS — M67.873 ACHILLES TENDINOSIS OF RIGHT ANKLE: ICD-10-CM

## 2024-04-11 PROCEDURE — 3048F LDL-C <100 MG/DL: CPT | Performed by: ORTHOPAEDIC SURGERY

## 2024-04-11 PROCEDURE — 1036F TOBACCO NON-USER: CPT | Performed by: ORTHOPAEDIC SURGERY

## 2024-04-11 PROCEDURE — 1160F RVW MEDS BY RX/DR IN RCRD: CPT | Performed by: ORTHOPAEDIC SURGERY

## 2024-04-11 PROCEDURE — 1159F MED LIST DOCD IN RCRD: CPT | Performed by: ORTHOPAEDIC SURGERY

## 2024-04-11 PROCEDURE — 3052F HG A1C>EQUAL 8.0%<EQUAL 9.0%: CPT | Performed by: ORTHOPAEDIC SURGERY

## 2024-04-11 PROCEDURE — 99214 OFFICE O/P EST MOD 30 MIN: CPT | Performed by: ORTHOPAEDIC SURGERY

## 2024-04-11 RX ORDER — MELOXICAM 15 MG/1
15 TABLET ORAL DAILY
Qty: 30 TABLET | Refills: 0 | Status: SHIPPED | OUTPATIENT
Start: 2024-04-11

## 2024-04-11 NOTE — PROGRESS NOTES
Subjective    Patient ID: Zeny Scott is a 69 y.o. female.    Chief Complaint: Pain of the Right Ankle       69-year-old female referred by podiatry for evaluation of right ankle pain.  She describes this pain directly into the region of the Achilles tendon.  She has noted swelling and tenderness in this region.  She has been under the care of podiatry using a topical cream provided by prescription as well as a formal program of physical therapy for stretching without improvement.  She also used a cam boot which was of limited benefit.  Pain is made worse with ambulation.  No specific injury.  Has not noted any redness warmth and denies any fevers or chills.    Has had MRI scan demonstrating evidence of midfoot arthritis but also significant finding of chronic Achilles tendinitis with thickening    This patient's past medical, social, and family history were reviewed as well as a review of systems including updates on the patient's information encounter sheet  Patient denies history diabetes    Physical Examination  Constitutional: Patient's height and weight reviewed, appears well kempt  Psychiatric: Alert and oriented ×3, appropriate mood and behavior  Pulmonary: Breathing appears nonlabored, no apparent distress  Lymphatic: No appreciable lymphadenopathy to both the upper and lower extremities  Skin: No open lesions, rashes, ulcerations  Neurologic: Gross motor and sensory exam appear intact (except for abnormalities noted in the below muscle skeletal exam)    Musculoskeletal: Satisfactory range of motion of the right hip and the right knee without pain elicited.  The right ankle demonstrates no gross deformity.  There is thickening/fullness in the region of the Achilles tendon with localized tenderness which extends up into the muscle tendinous junction.  Plantarflexion strength is intact.  Ambulates in the office today in a pair of flats with mild discomfort and slight limp.  Ankle is stable to ligamentous  examination.    Assessment: Chronic Achilles tendinitis right ankle    Plan: Extended discussion ensued with the patient regarding the above diagnosis and treatment options.  Dr. Orozco had previously suggested referral to a foot and ankle specialist for possible surgical intervention which I feel would be reasonable but additional conservative treatment options also at this time are reasonable.  We suggested a return to physical therapy to include iontophoresis as well as the use of oral anti-inflammatory such as meloxicam as prescribed.  The patient was instructed to stop walking and flats and obtain well supported tennis shoe with a insert for heel lift at the same time as well.  She was given the name of a foot and ankle specialist if she does not see improvement with this plan.    Right Ankle           Current Outpatient Medications:     amLODIPine (Norvasc) 5 mg tablet, Take 1 tablet (5 mg) by mouth once daily., Disp: 90 tablet, Rfl: 3    atorvastatin (Lipitor) 40 mg tablet, Take by mouth., Disp: , Rfl:     azithromycin (Zithromax) 250 mg tablet, , Disp: , Rfl:     clotrimazole (Lotrimin) 1 % external solution, , Disp: , Rfl:     cyclobenzaprine (Flexeril) 5 mg tablet, Take 1 tablet (5 mg) by mouth 3 times a day as needed., Disp: , Rfl:     Eliquis 5 mg tablet, Take 1 tablet (5 mg) by mouth 2 times a day., Disp: , Rfl:     Farxiga 10 mg, TAKE ONE TABLET BY MOUTH EVERY DAY, Disp: 90 tablet, Rfl: 3    fluorouracil (Efudex) 5 % cream, Apply to spots on the right and left cheeks 2x daily x 2 weeks, Disp: 40 g, Rfl: 0    FreeStyle Main 2 [place] misc, use as directed to monitor blood glucose continuously, Disp: , Rfl:     FreeStyle Main 2 Sensor kit, use as directed and change every 14 days to monitor blood glucose continuously. use reader to scan at least 3 times per day, Disp: , Rfl:     FreeStyle Precision Maninder Strips strip, use as directed to monitor blood glucose three times daily, Disp: , Rfl:     gabapentin  (Neurontin) 100 mg capsule, Take 1 capsule (100 mg) by mouth 2 times a day., Disp: , Rfl:     gabapentin (Neurontin) 300 mg capsule, TAKE ONE CAPSULE BY MOUTH EVERY DAY AT BEDTIME, Disp: 30 capsule, Rfl: 0    glimepiride (Amaryl) 4 mg tablet, Take 1 tablet (4 mg) by mouth once daily in the morning. Take before meals., Disp: , Rfl:     HYDROcodone-acetaminophen (Norco) 5-325 mg tablet, Take 1 tablet by mouth every 4 hours if needed., Disp: , Rfl:     ketoconazole (NIZOral) 2 % cream, Apply to nose and chest 2x daily as needed, Disp: 60 g, Rfl: 3    LORazepam (Ativan) 1 mg tablet, Take 1 tablet (1 mg) by mouth once daily as needed., Disp: , Rfl:     methIMAzole (Tapazole) 5 mg tablet, Take 1 tablet (5 mg) by mouth 3 times a day., Disp: , Rfl:     metoprolol tartrate (Lopressor) 25 mg tablet, Take 1 tablet (25 mg) by mouth 2 times a day., Disp: , Rfl:     metroNIDAZOLE (Flagyl) 500 mg tablet, Take 1 tablet (500 mg) by mouth., Disp: , Rfl:     mupirocin (Bactroban) 2 % ointment, APPLY A SMALL AMOUNT TO LEFT NOSTRIL 3 TIMES DAILY FOR 2 WEEKS, Disp: , Rfl:     neomycin-polymyxin-HC (Cortisporin) 3.5-10,000-1 mg/mL-unit/mL-% otic suspension, instill 4 drops into the left ear twice a day for 7 days, Disp: , Rfl:     nitroglycerin (Nitrostat) 0.4 mg SL tablet, Place 1 tablet (0.4 mg) under the tongue every 5 minutes if needed for chest pain., Disp: , Rfl:     nystatin-triamcinolone (Mycolog II) ointment, Apply topically 2 times a day as needed (itching/burning)., Disp: 15 g, Rfl: 0    omeprazole (PriLOSEC) 40 mg DR capsule, TAKE ONE CAPSULE BY MOUTH EVERY DAY, Disp: 30 capsule, Rfl: 0    spironolactone (Aldactone) 25 mg tablet, Take 1 tablet (25 mg) by mouth once daily., Disp: , Rfl:     torsemide (Demadex) 20 mg tablet, Take 1 tablet (20 mg) by mouth once daily., Disp: , Rfl:     triamcinolone (Kenalog) 0.025 % cream, Apply to cheeks 2x daily x 2 weeks after finishing 5fu treatment, Disp: 30 g, Rfl: 0

## 2024-04-23 ENCOUNTER — APPOINTMENT (OUTPATIENT)
Dept: ORTHOPEDIC SURGERY | Facility: CLINIC | Age: 69
End: 2024-04-23
Payer: COMMERCIAL

## 2024-04-26 ENCOUNTER — TELEPHONE (OUTPATIENT)
Dept: CARDIOLOGY | Facility: CLINIC | Age: 69
End: 2024-04-26

## 2024-04-26 ENCOUNTER — EVALUATION (OUTPATIENT)
Dept: PHYSICAL THERAPY | Facility: CLINIC | Age: 69
End: 2024-04-26
Payer: COMMERCIAL

## 2024-04-26 DIAGNOSIS — M67.873 ACHILLES TENDINOSIS OF RIGHT ANKLE: Primary | ICD-10-CM

## 2024-04-26 PROCEDURE — 97161 PT EVAL LOW COMPLEX 20 MIN: CPT | Mod: GP | Performed by: PHYSICAL THERAPIST

## 2024-04-26 PROCEDURE — 97110 THERAPEUTIC EXERCISES: CPT | Mod: GP | Performed by: PHYSICAL THERAPIST

## 2024-04-26 ASSESSMENT — PATIENT HEALTH QUESTIONNAIRE - PHQ9
2. FEELING DOWN, DEPRESSED OR HOPELESS: NOT AT ALL
1. LITTLE INTEREST OR PLEASURE IN DOING THINGS: NOT AT ALL
SUM OF ALL RESPONSES TO PHQ9 QUESTIONS 1 AND 2: 0

## 2024-04-26 NOTE — TELEPHONE ENCOUNTER
Pt calling in regards to endoscopy referral. Dr. Tee provided her with a physician name but she cannot remember the name.    Please advise. Thanks.

## 2024-04-26 NOTE — PROGRESS NOTES
Physical Therapy    Physical Therapy Evaluation    Patient Name: Zeny Scott  MRN: [id number]  Today's Date: 4/26/2024  Time Calculation  Start Time: 0915  Stop Time: 1000  Time Calculation (min): 45 min     Problem List Items Addressed This Visit             ICD-10-CM    Achilles tendinosis of right ankle - Primary M67.873    Relevant Orders    Follow Up In Physical Therapy       Insurance:  Visit number: 1 of $40 COPAY VISITS ARE MED NEC NO AUTH NEEDED   Insurance Type: Payor: [employer] / Plan: [employer] / Product Type: *No Product type* /       General:  Reason for visit: R achilles pain.   MRI: There is severe Achilles tendinosis, with an area of minimal low grade intrasubstance tearing suspected involving its avascular zone.  2) Mild peroneus brevis tendinosis is noted without tear.  3) Chronic sprains of the anterior talofibular, calcaneofibular, and deltoid ligaments are noted.  Referred by: Lopresti, M.      Precautions:  DM, HTN, neuropathy. Afib on Eloquis.  Significant OA L knee - causing mild to mod limping - not a surgical candidate - A1C is too high.  Fall Risk: Moderate, Has fallen 5 x in the past 1 year.   Her legs give out.  She does have significant L knee pain due to severe OA.     Assessment:    Chronic Right achilles tendinopathy with MRI partial tear.  Mild irritability.  Mild to moderately antalgic gait due to significant Left knee pain.  She is not a surgical (TKA) candidate due to her A1c is 8 -9, and she is not able to lower it.   I believe this L knee pain/altered gait is contributing to her R ankle as her gait is compensated.  Healing potential compromised due to elevated A1C, obesity, L knee OA which significantly alters gait.    Impairments: Pain, Strength, Decreased functional level, Aerobic capacity/endurance, and Sensory    Functional Limitations: Sleeping, Walking, Stair negotiation, and Standing    Recommended Treatment:  Therapeutic exercise, Manual therapy,  Gait training, Home program instruction and progression, Neuromuscular re-education, Therapeutic activities, Self care and home management, and Instruction in activity modification      Plan:  Plan of care was developed with input and agreement by the patient.  0-1 x week x 52 weeks.    Rehab Potential:   Good to achieve goals.    Goals:  Short Term Goals:   - Patient to be Indep in [facility] for self management of symptoms    - Patient to report 3/10 pain in the R ankle.    Long Term Goals:   - LEFS: 54/80 to indicate a significant improvement in overall function.      - Patient will demo mild to no limitation AROM right ankle to allow for correct mechanics with functional mobility.    - Patient to demonstrate gait with least restrictive device for all ADLS and does not demonstrate significant deviations that may contribute to discomfort in the future    - Patient to negotiate stairs reciprocally and descend with near equal eccentric control with use of hand rail.        Subjective:  CC:  Late 2023 onset of R achilles pain.  Pain is intermittent.  Aggravated when she is supine at night.  Props leg at night with foot on bed board and it feels better.  She is on gabapentin for the L knee which helps and she applies a topical cream.  Pain is up to 3-5/10.  At times 0/10.  Driving is aggravating.  REENA:  insidious  DOI:   PAIN - Location: R achilles.   Pain Quality:  pulling type pain  PLOF: Pain free prior to last fall/winter  FUNCTIONAL LIMITATIONS: Sleeping, Walking, Stair negotiation, and Standing   WORK: retired LPN  Patient Stated Goal: decrease pain    Medical History Form: Reviewed (scanned into chart)    Objective:   OBJECTIVE:    GAIT: Independent. no device Mildly antalgic and Moderately antalgic due to L knee.  STAIRS:  Ascends step too and descends step too with the use of one rail. Due to L knee pain.    POSTURE: Pes planus    PALPATION:  Point tender at the nodule on the R achilles    SENSATION: Impaired due to  "neuropathy - pain numbness tingling in the LEs    ROM:  Ankle ROM Right: 10 degrees dorsiflexion  65 degrees plantarflexion  25 degrees inversion  10 degrees eversion    FLEXIBILITY:  Gastroc R/L: Minimal limitation / Minimal limitation     STRENGTH:  no pain with MMT.  Manual Muscle Test Ankle: .  Dorsiflexion R/L: 5 / 5  Plantarflexion R/L: 5 / 5  Eversion R/L: 5 / 5  Inversion R/L: 5 / 5  Unable to HR bilaterally due to L knee pain  Able to perform partial Right uni HR with mild pain    SPECIAL TESTING  Special Tests Right Ankle: .  Arc Sign R: positive    [facility] Test R: positive       Outcome Measures:  Other Measures  Lower Extremity Funtional Score (LEFS): 36     Treatment Performed: (\"NP\" = Not Performed)     Therapeutic Exercise:     15 minutes  Home exercise program instructed and issued.  Access Code: [email or web address] - Black band issued.  Scrambler therapy for neuropathy handout issued.  Advised to follow up with MD regarding R sciatica.  L knee is contributing to R ankle - can't have cortisone due to elevated A1C, has had synthetic cartilage  injection but not effective.  Needs TKA, but A1C too high.  - Seated Ankle Plantar Flexion with Resistance Loop  - 7 x weekly - 3-4 sets - 15-20 reps    Manual Therapy:       minutes      Neuromuscular Re-education:      minutes      Gait Training:            minutes      Aquatics:            minutes      Therapeutic Activity:      minutes      Modalities:       Vasopneumatic Device       minutes  Electrical Stimulation          minutes  Ultrasound            minutes  Iontophoresis                     minutes  Cold Pack            minutes  Mechanical Traction           minutes    Self Care Home Management:    minutes    Canalith Reposition:          minutes     Education:          minutes    Other:       minutes      Evaluation Complexity: Low: 25 minutes; Moderate   minutes; Complex PT Evaluation Time Entry: 25;  minutes    Re-Evaluation:   minutes    "

## 2024-04-27 DIAGNOSIS — I10 PRIMARY HYPERTENSION: ICD-10-CM

## 2024-04-29 NOTE — TELEPHONE ENCOUNTER
Update: Pt calling for referral to Endocrinologist.     Pt states she cannot see Dr. Orantes because she had an appointment with him and had to cancel and was informed she cannot scheduled another appointment because she cancelled.    Please advise. Thanks.

## 2024-04-30 PROBLEM — K85.90 ACUTE PANCREATITIS (HHS-HCC): Status: ACTIVE | Noted: 2024-04-30

## 2024-04-30 RX ORDER — SPIRONOLACTONE 25 MG/1
25 TABLET ORAL DAILY
Qty: 90 TABLET | Refills: 3 | Status: SHIPPED | OUTPATIENT
Start: 2024-04-30

## 2024-04-30 NOTE — TELEPHONE ENCOUNTER
Called and notified pt of referral to Dr. Mishel Sampson, endocrinology. Provided contact number: [phone]. Pt verbalizes understanding.

## 2024-05-01 ENCOUNTER — APPOINTMENT (OUTPATIENT)
Dept: CARDIOLOGY | Facility: CLINIC | Age: 69
End: 2024-05-01
Payer: COMMERCIAL

## 2024-05-01 ENCOUNTER — TREATMENT (OUTPATIENT)
Dept: PHYSICAL THERAPY | Facility: CLINIC | Age: 69
End: 2024-05-01
Payer: COMMERCIAL

## 2024-05-01 ENCOUNTER — OFFICE VISIT (OUTPATIENT)
Dept: PODIATRY | Facility: CLINIC | Age: 69
End: 2024-05-01
Payer: COMMERCIAL

## 2024-05-01 DIAGNOSIS — E11.49 TYPE II DIABETES MELLITUS WITH NEUROLOGICAL MANIFESTATIONS (MULTI): ICD-10-CM

## 2024-05-01 DIAGNOSIS — M79.672 PAIN IN BOTH FEET: ICD-10-CM

## 2024-05-01 DIAGNOSIS — I87.2 CHRONIC VENOUS INSUFFICIENCY OF LOWER EXTREMITY: Primary | ICD-10-CM

## 2024-05-01 DIAGNOSIS — M79.671 PAIN IN BOTH FEET: ICD-10-CM

## 2024-05-01 DIAGNOSIS — R60.0 LEG EDEMA: ICD-10-CM

## 2024-05-01 DIAGNOSIS — L97.512 FOOT ULCER WITH FAT LAYER EXPOSED, RIGHT (MULTI): ICD-10-CM

## 2024-05-01 DIAGNOSIS — L97.522 FOOT ULCER WITH FAT LAYER EXPOSED, LEFT (MULTI): ICD-10-CM

## 2024-05-01 DIAGNOSIS — M67.873 ACHILLES TENDINOSIS OF RIGHT ANKLE: ICD-10-CM

## 2024-05-01 PROCEDURE — 97110 THERAPEUTIC EXERCISES: CPT | Mod: GP | Performed by: PHYSICAL THERAPIST

## 2024-05-01 PROCEDURE — 3048F LDL-C <100 MG/DL: CPT | Performed by: PODIATRIST

## 2024-05-01 PROCEDURE — 29580 STRAPPING UNNA BOOT: CPT | Performed by: PODIATRIST

## 2024-05-01 PROCEDURE — 99213 OFFICE O/P EST LOW 20 MIN: CPT | Performed by: PODIATRIST

## 2024-05-01 PROCEDURE — 1159F MED LIST DOCD IN RCRD: CPT | Performed by: PODIATRIST

## 2024-05-01 PROCEDURE — 3052F HG A1C>EQUAL 8.0%<EQUAL 9.0%: CPT | Performed by: PODIATRIST

## 2024-05-01 PROCEDURE — 1160F RVW MEDS BY RX/DR IN RCRD: CPT | Performed by: PODIATRIST

## 2024-05-01 NOTE — PROGRESS NOTES
"Chief Complaint   Patient presents with    Foot Skin Problem     Patient is here today with a sores on both feet. Patient is diabetic.  Wore a pair of slippers and took them off and it started itching on top of the feet. Scratched too much and created a sore. Patient complains of neuropathy of both feet.     HPI: Patient complains of ulcers on the dorsal feet.  Started on Monday, top of feet were itching, scrathed them and the \"skin fell off\".  She was wearing a pair of moccasins.  She cleaned the ulcers with hibiclens,applied antibacterial ointment, hydrocortisone cream and antifungal cream.  Pain and edema remains     Physical exam  Patient alert, oriented, no acute distress    +2/4 pedal pulses B/L.    Moderate amount LE edema B/L, increased from last visit  Multiple varicosities noted B/L.    No calor noted B/L  Minimal periwound erythema noted B/L consistent with pressure and does not appear to be infectious.  Ulcers noted on the dorsal left and right foot  Scant serous drainage noted from the ulcerations  There is no crepitus, no malodor, no necrosis noted B/L  Left foot ulcer measures 1 x 0.4 cm  Right foot ulcer measures 0.3 x 0.2 cm  Pain upon palpation of the ulcerations L>R    Light touch is intact B/L  Gross motor is intact B/L  Lab Results   Component Value Date    HGBA1C 8.4 (H) 02/12/2024      Assessment and plan  #1  Ulcers B/L  Ulcers do not appear to be infectious  Ulcers are symmetrical are likely secondary to her underlying diabetic neuropathy and her shoe gear  Unna boot applied to the right and left lower extremity  Leave intact 4 to 7 days  Keep the dressings dry  Follow-up 1 week    #2 chronic venous insufficiency  Increased edema is complicates wound healing  Treatment as above    #3 Diabetes mellitus with peripheral neuropathy  Reviewed Hemoglobin A1c  Control glucose  Follow-up 1 week  "

## 2024-05-01 NOTE — PROGRESS NOTES
PHYSICAL THERAPY TREATMENT NOTE    Patient Name:  Zeny Scott   Patient MRN: [id number]  Date: 05/01/24  Time Calculation  Start Time: 0330  Stop Time: 0355  Time Calculation (min): 25 min      Problem List Items Addressed This Visit             ICD-10-CM    Achilles tendinosis of right ankle M67.873       Insurance:  Visit number: 2 of med Nec;  $40 COPAY VISITS ARE MED NEC NO AUTH NEEDED   Insurance Type: Payor: [employer] / Plan: [employer] / Product Type: *No Product type* /     General:  Reason for visit: R achilles pain.   MRI: There is severe Achilles tendinosis, with an area of minimal low grade intrasubstance tearing suspected involving its avascular zone.  2) Mild peroneus brevis tendinosis is noted without tear.  3) Chronic sprains of the anterior talofibular, calcaneofibular, and deltoid ligaments are noted.  Referred by: Lopresti, M.    Precautions:  DM, HTN, neuropathy. Afib on Eloquis.  Significant OA L knee - causing mild to mod limping - not a surgical candidate - A1C is too high.  Fall Risk: Moderate, Has fallen 5 x in the past 1 year.   Her legs give out.  She does have significant L knee pain due to severe OA.    Assessment:    Education: Home exercise program instructed and issued.  Progress towards functional goals: Patient reports there has not been a significant change in functional abilities.  Function limited by L knee pain.  Response to interventions: Tolerated treatment session well without any increase in pain. Patient educated on on HEP and pain guided progression of tendon loading.  Also discussed need to get in with an endocrinologist for DM.  Her Left knee is severely arthritic and painful, but she is not eligible for TKA due to uncontrolled DM. Due to L knee pain her gait is significantly deviated and could be contributing to the R Achilles  "pain.   4 x 10 of bilateral heel raises did not increased R achilles pain.  Pain at start 1/10, pain post ex 1/10 .  She is following up with podiatry today due to new wounds on both feet.  Justification for continued skilled care: To address remaining functional, objective and subjective deficits to allow them to return to full independence with ADLs.    Plan:  Uni heel raise.    Subjective:   Zeny reports she feels like her condition is neither improving nor worsening.  Progress towards functional goal:  Patient reports there has not been a significant change in functional abilities.   Pain (0-10): 2 /10 pain R Achilles.  Achilles pain ranges from 0-4/10.  ; has 2 sores on the top of both feet that are new. She is seeing MD after today's appt.   HEP adherence / understanding: compliance with the instructed home exercises.    Objective:      Treatment Performed: (\"NP\" = Not Performed)     Therapeutic Exercise:     25 minutes  Access Code: [email or web address] - Black band issued.  Scrambler therapy for neuropathy handout issued.  Advised to follow up with MD regarding R sciatica.  L knee is contributing to R ankle - can't have cortisone due to elevated A1C, has had synthetic cartilage  injection but not effective.  Needs TKA, but A1C too high.  - Seated Ankle Plantar Flexion with Resistance Loop  - 7 x weekly - 3-4 sets - 15-20 reps      Manual Therapy:       minutes      Neuromuscular Re-education:      minutes      Gait Training:            minutes      Aquatics:            minutes      Therapeutic Activity:      minutes      Gait Training:            minutes      Modalities:       Vasopneumatic Device       minutes  Electrical Stimulation          minutes  Ultrasound            minutes  Iontophoresis                     minutes  Cold Pack            minutes  Mechanical Traction           minutes    Self Care Home Management:    minutes    Canalith Reposition:          minutes     Education:          minutes    Other: "       minutes      Evaluation Complexity: Low:   minutes; Moderate   minutes; Complex   minutes    Re-Evaluation:   minutes

## 2024-05-06 ENCOUNTER — APPOINTMENT (OUTPATIENT)
Dept: PHYSICAL THERAPY | Facility: CLINIC | Age: 69
End: 2024-05-06
Payer: COMMERCIAL

## 2024-05-10 ENCOUNTER — OFFICE VISIT (OUTPATIENT)
Dept: ORTHOPEDIC SURGERY | Facility: CLINIC | Age: 69
End: 2024-05-10
Payer: COMMERCIAL

## 2024-05-10 ENCOUNTER — APPOINTMENT (OUTPATIENT)
Dept: PHYSICAL THERAPY | Facility: CLINIC | Age: 69
End: 2024-05-10
Payer: COMMERCIAL

## 2024-05-10 VITALS
HEIGHT: 63 IN | BODY MASS INDEX: 36.86 KG/M2 | DIASTOLIC BLOOD PRESSURE: 76 MMHG | SYSTOLIC BLOOD PRESSURE: 129 MMHG | HEART RATE: 79 BPM | WEIGHT: 208 LBS

## 2024-05-10 DIAGNOSIS — M25.562 CHRONIC PAIN OF LEFT KNEE: Primary | ICD-10-CM

## 2024-05-10 DIAGNOSIS — M17.0 ARTHRITIS OF BOTH KNEES: ICD-10-CM

## 2024-05-10 DIAGNOSIS — G89.29 CHRONIC PAIN OF LEFT KNEE: Primary | ICD-10-CM

## 2024-05-10 PROCEDURE — 3078F DIAST BP <80 MM HG: CPT | Performed by: ORTHOPAEDIC SURGERY

## 2024-05-10 PROCEDURE — 3048F LDL-C <100 MG/DL: CPT | Performed by: ORTHOPAEDIC SURGERY

## 2024-05-10 PROCEDURE — 1160F RVW MEDS BY RX/DR IN RCRD: CPT | Performed by: ORTHOPAEDIC SURGERY

## 2024-05-10 PROCEDURE — 1159F MED LIST DOCD IN RCRD: CPT | Performed by: ORTHOPAEDIC SURGERY

## 2024-05-10 PROCEDURE — 1036F TOBACCO NON-USER: CPT | Performed by: ORTHOPAEDIC SURGERY

## 2024-05-10 PROCEDURE — 3074F SYST BP LT 130 MM HG: CPT | Performed by: ORTHOPAEDIC SURGERY

## 2024-05-10 PROCEDURE — 3052F HG A1C>EQUAL 8.0%<EQUAL 9.0%: CPT | Performed by: ORTHOPAEDIC SURGERY

## 2024-05-10 PROCEDURE — 20610 DRAIN/INJ JOINT/BURSA W/O US: CPT | Performed by: ORTHOPAEDIC SURGERY

## 2024-05-10 PROCEDURE — 99213 OFFICE O/P EST LOW 20 MIN: CPT | Performed by: ORTHOPAEDIC SURGERY

## 2024-05-10 RX ORDER — LIDOCAINE HYDROCHLORIDE 10 MG/ML
2 INJECTION INFILTRATION; PERINEURAL
Status: COMPLETED | OUTPATIENT
Start: 2024-05-10 | End: 2024-05-10

## 2024-05-10 RX ORDER — TRIAMCINOLONE ACETONIDE 40 MG/ML
40 INJECTION, SUSPENSION INTRA-ARTICULAR; INTRAMUSCULAR
Status: COMPLETED | OUTPATIENT
Start: 2024-05-10 | End: 2024-05-10

## 2024-05-10 RX ADMIN — TRIAMCINOLONE ACETONIDE 40 MG: 40 INJECTION, SUSPENSION INTRA-ARTICULAR; INTRAMUSCULAR at 11:10

## 2024-05-10 RX ADMIN — LIDOCAINE HYDROCHLORIDE 2 ML: 10 INJECTION INFILTRATION; PERINEURAL at 11:10

## 2024-05-10 NOTE — PROGRESS NOTES
69-year-old is seen with left knee pain.  She has been having persistent severe sharp shooting pain in the left knee.  Pain is worse with standing and walking and going up and down stairs and getting up and down from a chair in and out of a car.  Her hemoglobin A1c in February was 8.4.  She has a visit with her endocrinologist Veronique 3.  Cortisone injections have helped her in the past.    Pleasant and no acute distress. Walks with antalgic gait. Stands with varus alignment of the left knee and neutral on the right. Left knee range of motion is 5-110°. The knee is stable to varus and valgus stress Lachman and posterior drawer. There is a mild effusion. There is generalized tenderness. Right knee range of motion is 0-120°. There is no effusion. The knee is stable to varus and valgus stress Lachman and posterior drawer. Both lower extremities are well perfused the skin is intact and muscle tone is adequate.    A discussion about arthritis was done.  Treatment options reviewed and the decision was made to proceed with cortisone injection.  She will use Tylenol and avoid aggravating activities.  She can follow-up for intermittent injections.  Potential impact on her blood glucose levels was discussed.  At some point if her blood glucose levels are better controlled she could be a candidate for knee replacement.    L Inj/Asp: L knee on 5/10/2024 11:10 AM  Indications: pain  Details: 22 G needle, superolateral approach  Medications: 40 mg triamcinolone acetonide 40 mg/mL; 2 mL lidocaine 10 mg/mL (1 %)  Procedure, treatment alternatives, risks and benefits explained, specific risks discussed. Consent was given by the patient.

## 2024-05-17 ENCOUNTER — APPOINTMENT (OUTPATIENT)
Dept: PHYSICAL THERAPY | Facility: CLINIC | Age: 69
End: 2024-05-17
Payer: COMMERCIAL

## 2024-05-20 DIAGNOSIS — M79.2 NEUROPATHIC PAIN: ICD-10-CM

## 2024-05-20 DIAGNOSIS — K21.9 GASTROESOPHAGEAL REFLUX DISEASE, UNSPECIFIED WHETHER ESOPHAGITIS PRESENT: ICD-10-CM

## 2024-05-21 RX ORDER — GABAPENTIN 300 MG/1
300 CAPSULE ORAL NIGHTLY
Qty: 30 CAPSULE | Refills: 0 | Status: SHIPPED | OUTPATIENT
Start: 2024-05-21

## 2024-05-22 RX ORDER — OMEPRAZOLE 40 MG/1
40 CAPSULE, DELAYED RELEASE ORAL DAILY
Qty: 30 CAPSULE | Refills: 0 | Status: SHIPPED | OUTPATIENT
Start: 2024-05-22

## 2024-05-24 ENCOUNTER — DOCUMENTATION (OUTPATIENT)
Dept: PHYSICAL THERAPY | Facility: CLINIC | Age: 69
End: 2024-05-24
Payer: COMMERCIAL

## 2024-05-29 ENCOUNTER — TELEPHONE (OUTPATIENT)
Dept: OBSTETRICS AND GYNECOLOGY | Facility: CLINIC | Age: 69
End: 2024-05-29
Payer: COMMERCIAL

## 2024-05-29 DIAGNOSIS — N76.0 VAGINITIS AND VULVOVAGINITIS: ICD-10-CM

## 2024-05-29 RX ORDER — NYSTATIN AND TRIAMCINOLONE ACETONIDE 100000; 1 [USP'U]/G; MG/G
OINTMENT TOPICAL 2 TIMES DAILY PRN
Qty: 30 G | Refills: 3 | Status: SHIPPED | OUTPATIENT
Start: 2024-05-29

## 2024-05-29 NOTE — TELEPHONE ENCOUNTER
----- Message from Regla Castellano sent at 5/28/2024  4:11 PM EDT -----  Patient calling for a refill of:  Nystatin Cream.     Patient believes that her Sarxiga medication is causing her to itch and the Nystatin Cream only thing that is helping her.     Local pharmacy on file.

## 2024-05-31 ENCOUNTER — DOCUMENTATION (OUTPATIENT)
Dept: PHYSICAL THERAPY | Facility: CLINIC | Age: 69
End: 2024-05-31
Payer: COMMERCIAL

## 2024-05-31 NOTE — PROGRESS NOTES
No show to PT appt.  Patient will be discharged from PT due to non compliance with POC and 2 consecutive no shows.

## 2024-06-06 ENCOUNTER — APPOINTMENT (OUTPATIENT)
Dept: PHYSICAL THERAPY | Facility: CLINIC | Age: 69
End: 2024-06-06
Payer: COMMERCIAL

## 2024-06-12 ENCOUNTER — DOCUMENTATION WITH CHARGES (OUTPATIENT)
Dept: PHYSICAL THERAPY | Facility: CLINIC | Age: 69
End: 2024-06-12
Payer: COMMERCIAL

## 2024-06-19 DIAGNOSIS — K21.9 GASTROESOPHAGEAL REFLUX DISEASE, UNSPECIFIED WHETHER ESOPHAGITIS PRESENT: ICD-10-CM

## 2024-06-24 ENCOUNTER — APPOINTMENT (OUTPATIENT)
Dept: PAIN MEDICINE | Facility: CLINIC | Age: 69
End: 2024-06-24
Payer: COMMERCIAL

## 2024-06-24 RX ORDER — OMEPRAZOLE 40 MG/1
40 CAPSULE, DELAYED RELEASE ORAL DAILY
Qty: 30 CAPSULE | Refills: 0 | Status: SHIPPED | OUTPATIENT
Start: 2024-06-24

## 2024-07-09 DIAGNOSIS — G60.3 IDIOPATHIC PROGRESSIVE NEUROPATHY: Primary | ICD-10-CM

## 2024-07-09 RX ORDER — GABAPENTIN 100 MG/1
100 CAPSULE ORAL 2 TIMES DAILY
Qty: 60 CAPSULE | Refills: 1 | Status: SHIPPED | OUTPATIENT
Start: 2024-07-09

## 2024-07-19 DIAGNOSIS — K21.9 GASTROESOPHAGEAL REFLUX DISEASE, UNSPECIFIED WHETHER ESOPHAGITIS PRESENT: ICD-10-CM

## 2024-07-24 ENCOUNTER — APPOINTMENT (OUTPATIENT)
Dept: DERMATOLOGY | Facility: CLINIC | Age: 69
End: 2024-07-24
Payer: COMMERCIAL

## 2024-07-24 RX ORDER — OMEPRAZOLE 40 MG/1
40 CAPSULE, DELAYED RELEASE ORAL DAILY
Qty: 30 CAPSULE | Refills: 0 | Status: SHIPPED | OUTPATIENT
Start: 2024-07-24

## 2024-08-06 ENCOUNTER — APPOINTMENT (OUTPATIENT)
Dept: GASTROENTEROLOGY | Facility: CLINIC | Age: 69
End: 2024-08-06
Payer: COMMERCIAL

## 2024-08-14 ENCOUNTER — HOSPITAL ENCOUNTER (EMERGENCY)
Facility: HOSPITAL | Age: 69
Discharge: HOME | End: 2024-08-14
Payer: COMMERCIAL

## 2024-08-14 VITALS
SYSTOLIC BLOOD PRESSURE: 145 MMHG | HEIGHT: 63 IN | WEIGHT: 194 LBS | HEART RATE: 65 BPM | DIASTOLIC BLOOD PRESSURE: 63 MMHG | BODY MASS INDEX: 34.38 KG/M2 | TEMPERATURE: 79 F | RESPIRATION RATE: 18 BRPM

## 2024-08-14 PROCEDURE — 4500999001 HC ED NO CHARGE

## 2024-08-14 ASSESSMENT — COLUMBIA-SUICIDE SEVERITY RATING SCALE - C-SSRS
6. HAVE YOU EVER DONE ANYTHING, STARTED TO DO ANYTHING, OR PREPARED TO DO ANYTHING TO END YOUR LIFE?: NO
2. HAVE YOU ACTUALLY HAD ANY THOUGHTS OF KILLING YOURSELF?: NO
1. IN THE PAST MONTH, HAVE YOU WISHED YOU WERE DEAD OR WISHED YOU COULD GO TO SLEEP AND NOT WAKE UP?: NO

## 2024-08-14 ASSESSMENT — PAIN - FUNCTIONAL ASSESSMENT: PAIN_FUNCTIONAL_ASSESSMENT: 0-10

## 2024-08-14 ASSESSMENT — PAIN DESCRIPTION - PAIN TYPE: TYPE: ACUTE PAIN

## 2024-08-14 ASSESSMENT — PAIN SCALES - GENERAL: PAINLEVEL_OUTOF10: 8

## 2024-08-14 NOTE — ED TRIAGE NOTES
Pt has flank pain. Pt states she has a kidney stone now. Pt states she has had some urinary difficulty.

## 2024-08-15 PROBLEM — K76.9 DISEASE OF LIVER: Status: ACTIVE | Noted: 2024-08-15

## 2024-08-15 PROBLEM — E66.9 OBESITY: Status: ACTIVE | Noted: 2024-07-08

## 2024-08-15 PROBLEM — F32.9 MAJOR DEPRESSION, SINGLE EPISODE: Status: ACTIVE | Noted: 2024-08-15

## 2024-08-15 PROBLEM — M25.559 PAIN IN JOINT INVOLVING PELVIC REGION AND THIGH: Status: ACTIVE | Noted: 2024-08-15

## 2024-08-15 PROBLEM — L81.4 OTHER MELANIN HYPERPIGMENTATION: Status: RESOLVED | Noted: 2022-08-12 | Resolved: 2024-08-15

## 2024-08-15 PROBLEM — R60.0 LOCALIZED EDEMA: Status: ACTIVE | Noted: 2024-08-15

## 2024-08-15 PROBLEM — M54.2 NECK PAIN: Status: ACTIVE | Noted: 2024-08-15

## 2024-08-15 PROBLEM — F41.9 ANXIETY DISORDER: Status: ACTIVE | Noted: 2024-08-15

## 2024-08-15 PROBLEM — R05.9 COUGH: Status: ACTIVE | Noted: 2024-08-15

## 2024-08-15 PROBLEM — I11.9 HYPERTENSIVE HEART DISEASE WITHOUT CONGESTIVE HEART FAILURE: Status: ACTIVE | Noted: 2024-08-15

## 2024-08-15 PROBLEM — E66.811 CLASS 1 OBESITY WITH BODY MASS INDEX (BMI) OF 34.0 TO 34.9 IN ADULT: Status: ACTIVE | Noted: 2024-07-08

## 2024-08-15 PROBLEM — E05.90 HYPERTHYROIDISM: Status: ACTIVE | Noted: 2024-07-08

## 2024-08-20 ENCOUNTER — TELEPHONE (OUTPATIENT)
Dept: CARDIOLOGY | Facility: CLINIC | Age: 69
End: 2024-08-20

## 2024-08-20 ENCOUNTER — OFFICE VISIT (OUTPATIENT)
Dept: ORTHOPEDIC SURGERY | Facility: CLINIC | Age: 69
End: 2024-08-20
Payer: COMMERCIAL

## 2024-08-20 ENCOUNTER — HOSPITAL ENCOUNTER (OUTPATIENT)
Dept: VASCULAR MEDICINE | Facility: HOSPITAL | Age: 69
Discharge: HOME | End: 2024-08-20
Payer: COMMERCIAL

## 2024-08-20 ENCOUNTER — LAB (OUTPATIENT)
Dept: LAB | Facility: LAB | Age: 69
End: 2024-08-20
Payer: COMMERCIAL

## 2024-08-20 VITALS — BODY MASS INDEX: 34.38 KG/M2 | WEIGHT: 194 LBS | HEIGHT: 63 IN

## 2024-08-20 DIAGNOSIS — M25.561 ACUTE BILATERAL KNEE PAIN: ICD-10-CM

## 2024-08-20 DIAGNOSIS — M79.605 PAIN IN LEFT LEG: ICD-10-CM

## 2024-08-20 DIAGNOSIS — Z00.00 HEALTHCARE MAINTENANCE: ICD-10-CM

## 2024-08-20 DIAGNOSIS — M79.89 SWELLING OF BOTH LOWER EXTREMITIES: ICD-10-CM

## 2024-08-20 DIAGNOSIS — M25.562 ACUTE BILATERAL KNEE PAIN: ICD-10-CM

## 2024-08-20 LAB
EST. AVERAGE GLUCOSE BLD GHB EST-MCNC: 174 MG/DL
HBA1C MFR BLD: 7.7 %

## 2024-08-20 PROCEDURE — 93970 EXTREMITY STUDY: CPT | Performed by: SURGERY

## 2024-08-20 PROCEDURE — 1160F RVW MEDS BY RX/DR IN RCRD: CPT | Performed by: ORTHOPAEDIC SURGERY

## 2024-08-20 PROCEDURE — 36415 COLL VENOUS BLD VENIPUNCTURE: CPT

## 2024-08-20 PROCEDURE — 1159F MED LIST DOCD IN RCRD: CPT | Performed by: ORTHOPAEDIC SURGERY

## 2024-08-20 PROCEDURE — 3048F LDL-C <100 MG/DL: CPT | Performed by: ORTHOPAEDIC SURGERY

## 2024-08-20 PROCEDURE — 3051F HG A1C>EQUAL 7.0%<8.0%: CPT | Performed by: ORTHOPAEDIC SURGERY

## 2024-08-20 PROCEDURE — 83036 HEMOGLOBIN GLYCOSYLATED A1C: CPT

## 2024-08-20 PROCEDURE — 3008F BODY MASS INDEX DOCD: CPT | Performed by: ORTHOPAEDIC SURGERY

## 2024-08-20 PROCEDURE — 99213 OFFICE O/P EST LOW 20 MIN: CPT | Mod: 25 | Performed by: ORTHOPAEDIC SURGERY

## 2024-08-20 PROCEDURE — 93970 EXTREMITY STUDY: CPT

## 2024-08-20 PROCEDURE — 1036F TOBACCO NON-USER: CPT | Performed by: ORTHOPAEDIC SURGERY

## 2024-08-20 PROCEDURE — 99213 OFFICE O/P EST LOW 20 MIN: CPT | Performed by: ORTHOPAEDIC SURGERY

## 2024-08-20 NOTE — TELEPHONE ENCOUNTER
Pt called to request a sooner appointment with Dr. Tee. Pt is scheduled on 9/5/24 with Dr. Tee.    Pt c/o increased SOB. Pt denies chest pain, weight gain, minimal edema to right foot. Pt is coughing when speaking with me and states it is a dry cough. When speaking with patient, her voice is raspy and sounds congested. Pt denies any cardiac symptoms. Instructed pt to call PCP for direction whether she should be seen or possibly have labs and CXR. Informed pt if she cannot be seen by PCP, she can go to the Urgent Care for an evaluation before any symptoms become worse. Reviewed red flag symptoms that would make it necessary to go to the ER. Pt verbalizes understanding of all instructions and information provided. All questions and concerns addressed at this time.

## 2024-08-20 NOTE — LETTER
August 22, 2024     Javier Fisher DO  [address]  [address]  [place] [address]    Patient: Zeny Scott   YOB: 1955   Date of Visit: 8/20/2024       Dear Dr. Javier Fisher DO:    Thank you for referring Zeny Scott to me for evaluation. Below are my notes for this consultation.  If you have questions, please do not hesitate to call me. I look forward to following your patient along with you.       Sincerely,     Kade Savage MD      CC: No Recipients  ______________________________________________________________________________________    69-year-old is seen for her left knee.  She had a cortisone injection in May 10, 2024 without significant relief.  She takes gabapentin 100 mg with some relief.  She has not noted swelling and redness of her legs distally.  She does not have a recent hemoglobin A1c.  Pain is worse with standing and walking.    Pleasant and no acute distress. Walks with antalgic gait. Stands with varus alignment of the left knee and neutral on the right. Left knee range of motion is 5-110°. The knee is stable to varus and valgus stress Lachman and posterior drawer. There is a mild effusion. There is generalized tenderness. Right knee range of motion is 0-120°. There is no effusion. The knee is stable to varus and valgus stress Lachman and posterior drawer. Both lower extremities are well perfused the skin is intact and muscle tone is adequate.  There are chronic skin changes in both legs distally and swelling involving both legs with pretibial edema.    A discussion about arthritis was done.  Treatment options were reviewed.  She will be sent for an updated hemoglobin A1c.  Increased risk of infection and wound healing problems when having knee replacement with elevated hemoglobin A1c was discussed.  She is also being sent for an ultrasound of both lower extremities to evaluate for DVT.  She was advised to follow-up with her primary care physician in regards to  improving blood glucose control as well as for her lower extremity edema.  Both of these issues would need to be addressed prior to considering any type of surgical intervention.

## 2024-08-21 NOTE — PROGRESS NOTES
Subjective     History of Present Illness:   Zeny Scott is a 69 y.o. female who presents to GI clinic for follow-up  History of chronic hep C with cirrhosis s/p treatment of hep C with SVR  Last EGD in 08/2023 grade 1 varices  .recent U/Sin 2/24 showed no masses   Also on Eliquis for Afib    Most recent blood work showed normal LFTs (excpet mildly elevated ALP)  TSH persistent low <0.01  HbA1C at 8.5    Last colonoscopy in 2019 showed diverticulosis and hemorrhoids rec to repeat in 10 years     Having on and off change in her bowel habits and she feels that her stool is a little bit more oily.  She said that it stains the bathroom and it is oily.  She actually had the symptoms with her from this morning.  She was asking for a stool test.  She denies any significant diarrhea.  She says she goes to the bathroom only once a day.    As far as the cirrhosis goes she is feeling okay.  She does have some lower extremity edema that she thinks is related to her A-fib and is on furosemide 40 mg once daily with persistent edema.  She does have alteration of sleep-wake cycle but no confusion fevers or any changes.      Review of Systems  Review of Systems   Constitutional: Negative.    Respiratory: Negative.     Cardiovascular: Negative.    Musculoskeletal: Negative.    Skin: Negative.        Past Medical History   has a past medical history of Cataract, Diabetes mellitus (Multi), Personal history of other diseases of the circulatory system, Personal history of other diseases of the circulatory system, Personal history of other endocrine, nutritional and metabolic disease, Personal history of other endocrine, nutritional and metabolic disease, Personal history of other endocrine, nutritional and metabolic disease, Personal history of other endocrine, nutritional and metabolic disease, Personal history of other infectious and parasitic diseases, Personal history of other specified conditions (10/30/2019), and Shortness of  breath.     Social History   reports that she has never smoked. She has never used smokeless tobacco. She reports that she does not currently use alcohol. She reports that she does not use drugs.     Family History  family history includes Alzheimer's disease in her mother; Hypertension in her father; Kidney disease in her father; Liver cancer in her brother; cardiac disorder in her father.     Allergies  Allergies   Allergen Reactions    Oxycodone Shortness of breath    Nickel Unknown       Medications  Current Outpatient Medications   Medication Instructions    amLODIPine (NORVASC) 5 mg, oral, Daily    atorvastatin (Lipitor) 40 mg tablet oral    azithromycin (Zithromax) 250 mg tablet     clotrimazole (Lotrimin) 1 % external solution     cyclobenzaprine (FLEXERIL) 5 mg, oral, 3 times daily PRN    Eliquis 5 mg, oral, 2 times daily    Farxiga 10 mg, oral, Daily    fluorouracil (Efudex) 5 % cream Apply to spots on the right and left cheeks 2x daily x 2 weeks    FreeStyle Main 2 Delta misc use as directed to monitor blood glucose continuously    FreeStyle Main 2 Sensor kit use as directed and change every 14 days to monitor blood glucose continuously. use reader to scan at least 3 times per day    FreeStyle Precision Maninder Strips strip use as directed to monitor blood glucose three times daily    gabapentin (NEURONTIN) 300 mg, oral, Nightly, Take at bedtime.    gabapentin (NEURONTIN) 100 mg, oral, 2 times daily    glimepiride (AMARYL) 4 mg, oral, Daily before breakfast    HYDROcodone-acetaminophen (Norco) 5-325 mg tablet 1 tablet, oral, Every 4 hours PRN    ketoconazole (NIZOral) 2 % cream Apply to nose and chest 2x daily as needed    LORazepam (ATIVAN) 1 mg, oral, Daily PRN    meloxicam (MOBIC) 15 mg, oral, Daily    methIMAzole (TAPAZOLE) 5 mg, oral, 3 times daily    metoprolol tartrate (LOPRESSOR) 25 mg, oral, 2 times daily    metroNIDAZOLE (FLAGYL) 500 mg, oral    mupirocin (Bactroban) 2 % ointment APPLY A SMALL  AMOUNT TO LEFT NOSTRIL 3 TIMES DAILY FOR 2 WEEKS    neomycin-polymyxin-HC (Cortisporin) 3.5-10,000-1 mg/mL-unit/mL-% otic suspension instill 4 drops into the left ear twice a day for 7 days    nitroglycerin (NITROSTAT) 0.4 mg, sublingual, Every 5 min PRN    nystatin-triamcinolone (Mycolog II) ointment Topical, 2 times daily PRN    omeprazole (PRILOSEC) 40 mg, oral, Daily    spironolactone (ALDACTONE) 25 mg, oral, Daily    torsemide (DEMADEX) 20 mg, oral, Daily    triamcinolone (Kenalog) 0.025 % cream Apply to cheeks 2x daily x 2 weeks after finishing 5fu treatment       Objective   There were no vitals filed for this visit.  Physical Exam  Vitals reviewed.   Constitutional:       Appearance: Normal appearance.   Cardiovascular:      Rate and Rhythm: Normal rate and regular rhythm.      Pulses: Normal pulses.   Pulmonary:      Effort: Pulmonary effort is normal.      Breath sounds: Normal breath sounds.   Abdominal:      General: Abdomen is flat. Bowel sounds are normal. There is no distension.      Palpations: Abdomen is soft.      Tenderness: There is no abdominal tenderness.   Musculoskeletal:         General: Normal range of motion.      Right lower leg: Edema present.      Left lower leg: Edema present.   Neurological:      Mental Status: She is alert.               Assessment/Plan   Zeny Scott is a 69 y.o. female who presents to GI clinic for follow-up of cirrhosis.    1.  Cirrhosis.  Appears to be compensated and stable.  Only symptom is some alteration in sleep-wake cycle so I told her to use lactulose once a day to see if that helps because this could be related to early hepatoencephalopathy.  The other complications some lower extremity edema so I told her to increase her Lasix but told her to t doctor about it first.    Will order ultrasound and blood work to be done in 6 months.    2.  Oily stools.  Check pancreatic stool elastase today.            Josep Melton MD

## 2024-08-22 ENCOUNTER — LAB (OUTPATIENT)
Dept: LAB | Facility: LAB | Age: 69
End: 2024-08-22
Payer: COMMERCIAL

## 2024-08-22 ENCOUNTER — APPOINTMENT (OUTPATIENT)
Dept: GASTROENTEROLOGY | Facility: CLINIC | Age: 69
End: 2024-08-22
Payer: COMMERCIAL

## 2024-08-22 VITALS
WEIGHT: 195 LBS | BODY MASS INDEX: 34.54 KG/M2 | SYSTOLIC BLOOD PRESSURE: 126 MMHG | DIASTOLIC BLOOD PRESSURE: 80 MMHG | HEART RATE: 73 BPM

## 2024-08-22 DIAGNOSIS — R19.5 FAT IN STOOL: ICD-10-CM

## 2024-08-22 DIAGNOSIS — K74.60 CHRONIC HEPATITIS C WITH CIRRHOSIS (MULTI): Primary | ICD-10-CM

## 2024-08-22 DIAGNOSIS — B18.2 CHRONIC HEPATITIS C WITH CIRRHOSIS (MULTI): Primary | ICD-10-CM

## 2024-08-22 PROCEDURE — 3079F DIAST BP 80-89 MM HG: CPT | Performed by: INTERNAL MEDICINE

## 2024-08-22 PROCEDURE — 99214 OFFICE O/P EST MOD 30 MIN: CPT | Performed by: INTERNAL MEDICINE

## 2024-08-22 PROCEDURE — 1159F MED LIST DOCD IN RCRD: CPT | Performed by: INTERNAL MEDICINE

## 2024-08-22 PROCEDURE — 82653 EL-1 FECAL QUANTITATIVE: CPT

## 2024-08-22 PROCEDURE — 3074F SYST BP LT 130 MM HG: CPT | Performed by: INTERNAL MEDICINE

## 2024-08-22 PROCEDURE — 1160F RVW MEDS BY RX/DR IN RCRD: CPT | Performed by: INTERNAL MEDICINE

## 2024-08-22 PROCEDURE — 3048F LDL-C <100 MG/DL: CPT | Performed by: INTERNAL MEDICINE

## 2024-08-22 PROCEDURE — 3051F HG A1C>EQUAL 7.0%<8.0%: CPT | Performed by: INTERNAL MEDICINE

## 2024-08-22 ASSESSMENT — ENCOUNTER SYMPTOMS
CARDIOVASCULAR NEGATIVE: 1
RESPIRATORY NEGATIVE: 1
MUSCULOSKELETAL NEGATIVE: 1
CONSTITUTIONAL NEGATIVE: 1

## 2024-08-22 NOTE — PROGRESS NOTES
69-year-old is seen for her left knee.  She had a cortisone injection in May 10, 2024 without significant relief.  She takes gabapentin 100 mg with some relief.  She has not noted swelling and redness of her legs distally.  She does not have a recent hemoglobin A1c.  Pain is worse with standing and walking.    Pleasant and no acute distress. Walks with antalgic gait. Stands with varus alignment of the left knee and neutral on the right. Left knee range of motion is 5-110°. The knee is stable to varus and valgus stress Lachman and posterior drawer. There is a mild effusion. There is generalized tenderness. Right knee range of motion is 0-120°. There is no effusion. The knee is stable to varus and valgus stress Lachman and posterior drawer. Both lower extremities are well perfused the skin is intact and muscle tone is adequate.  There are chronic skin changes in both legs distally and swelling involving both legs with pretibial edema.    A discussion about arthritis was done.  Treatment options were reviewed.  She will be sent for an updated hemoglobin A1c.  Increased risk of infection and wound healing problems when having knee replacement with elevated hemoglobin A1c was discussed.  She is also being sent for an ultrasound of both lower extremities to evaluate for DVT.  She was advised to follow-up with her primary care physician in regards to improving blood glucose control as well as for her lower extremity edema.  Both of these issues would need to be addressed prior to considering any type of surgical intervention.

## 2024-08-27 ENCOUNTER — APPOINTMENT (OUTPATIENT)
Dept: NEUROLOGY | Facility: CLINIC | Age: 69
End: 2024-08-27
Payer: COMMERCIAL

## 2024-08-27 LAB — ELASTASE PANC STL-MCNT: >800 UG/G

## 2024-09-03 DIAGNOSIS — K21.9 GASTROESOPHAGEAL REFLUX DISEASE, UNSPECIFIED WHETHER ESOPHAGITIS PRESENT: ICD-10-CM

## 2024-09-04 ENCOUNTER — OFFICE VISIT (OUTPATIENT)
Dept: PODIATRY | Facility: CLINIC | Age: 69
End: 2024-09-04
Payer: COMMERCIAL

## 2024-09-04 DIAGNOSIS — E11.49 TYPE II DIABETES MELLITUS WITH NEUROLOGICAL MANIFESTATIONS (MULTI): ICD-10-CM

## 2024-09-04 DIAGNOSIS — M67.873 ACHILLES TENDINOSIS OF RIGHT ANKLE: ICD-10-CM

## 2024-09-04 DIAGNOSIS — I87.2 VENOUS STASIS DERMATITIS OF BOTH LOWER EXTREMITIES: Primary | ICD-10-CM

## 2024-09-04 DIAGNOSIS — I87.2 CHRONIC VENOUS INSUFFICIENCY OF LOWER EXTREMITY: ICD-10-CM

## 2024-09-04 PROCEDURE — 99214 OFFICE O/P EST MOD 30 MIN: CPT | Performed by: PODIATRIST

## 2024-09-04 PROCEDURE — 3048F LDL-C <100 MG/DL: CPT | Performed by: PODIATRIST

## 2024-09-04 PROCEDURE — 1160F RVW MEDS BY RX/DR IN RCRD: CPT | Performed by: PODIATRIST

## 2024-09-04 PROCEDURE — 1036F TOBACCO NON-USER: CPT | Performed by: PODIATRIST

## 2024-09-04 PROCEDURE — 1159F MED LIST DOCD IN RCRD: CPT | Performed by: PODIATRIST

## 2024-09-04 PROCEDURE — 3051F HG A1C>EQUAL 7.0%<8.0%: CPT | Performed by: PODIATRIST

## 2024-09-04 RX ORDER — OMEPRAZOLE 40 MG/1
40 CAPSULE, DELAYED RELEASE ORAL DAILY
Qty: 30 CAPSULE | Refills: 0 | Status: SHIPPED | OUTPATIENT
Start: 2024-09-04

## 2024-09-04 RX ORDER — TRIAMCINOLONE ACETONIDE 0.25 MG/G
OINTMENT TOPICAL 2 TIMES DAILY
Qty: 80 G | Refills: 1 | Status: SHIPPED | OUTPATIENT
Start: 2024-09-04

## 2024-09-04 NOTE — PROGRESS NOTES
Chief Complaint   Patient presents with    DM Foot Care     Patient here today for DM foot Care and complaints of in ANDREY at top of feet has neuropathy. Pain getting worst as of last visit Burning and throbbing and only happens at night.     Last visit 5/1/2024     HPI: Patient complains of neuropathic pain bilateral foot which is increasing.  Pain is worse at night.  Patient has a neurology appointment scheduled for next week.  Patient states her glucose is trending down last A1c was 7.7 down from 9.  Patient does have gabapentin prescribed but only takes it as needed.  Patient is complaining of itchy skin bilateral lower extremity.  She has chronic edema.  States she had recently an ultrasound and wanted to review results.    Physical exam  Patient alert, oriented, no acute distress     VASC: +2/4 pedal pulses B/L.    +1 pitting edema bilateral lower extremity.  Multiple varicosities noted B/L.      DERM: Excoriations noted and several scabbed over areas with mild erythema consistent with stasis dermatitis  No calor noted B/L  No cellulitis noted B/L    NEURO: Vibratory intact bilateral lower extremity.  5.07 [place] monofilament intact bilateral lower extremity.  Light touch intact bilateral lower extremity    MUSCULOSKEL: Gross motor intact bilateral lower extremity  Palpable fusiform Achilles tendon noted right, no pain on palpation no palpable gaps noted    Venous duplex 8/20/24  CONCLUSIONS:  Right Lower Venous: No evidence of acute deep vein thrombus visualized in the right lower extremity. Cannot rule out thrombus in non-visualized posterior tibial and Peroneal veins due to edema.  Left Lower Venous: No evidence of acute deep vein thrombus visualized in the left lower extremity. Cannot rule out thrombus in non-visualized posterior tibial and peroneal veins due to edema.     Imaging & Doppler Findings:     Right                 Compressible Thrombus        Flow  Distal External Iliac     Yes         None       Pulsatile  CFV                       Yes        None       Pulsatile  PFV                       Yes        None  FV Proximal               Yes        None   Spontaneous/Phasic  FV Mid                    Yes        None  FV Distal                 Yes        None  Popliteal                 Yes        None   Spontaneous/Phasic        Left                  Compress Thrombus        Flow  Distal External Iliac   Yes      None       Pulsatile  CFV                     Yes      None       Pulsatile  PFV                     Yes      None  FV Proximal             Yes      None   Spontaneous/Phasic  FV Mid                  Yes      None  FV Distal               Yes      None  Popliteal               Yes      None   Spontaneous/Phasic        [id number] Leena Jimenez MD, RPVI  Electronically signed by [id number] Leena Jimenez MD, SHONA on 8/22/2024 at 6:50:47 PM      === 03/18/24 ===    MR ANKLE RIGHT WO CONTRAST    - Impression -  1.  There is severe Achilles tendinosis, with an area of minimal low  grade intrasubstance tearing suspected involving its avascular zone.  2. Mild peroneus brevis tendinosis is noted without tear.  3. Chronic sprains of the anterior talofibular, calcaneofibular, and  deltoid ligaments are noted.  4. Diffuse mild articular cartilage loss of the midfoot and ankle is  noted.    I personally reviewed the images/study and I agree with the findings  as stated. This study was interpreted at [facility], [place], [place].    Signed by: Jeffery Peters 3/18/2024 12:09 PM  Dictation workstation:   [id number]        Lab Results   Component Value Date    HGBA1C 7.7 (H) 08/20/2024      Assessment and plan  #1  DM with peripheral neuropathy  Discussed findings and treatment options  Agree with neurology appointment  Rx: Topical neuropathic cream  Follow-up as needed    #2 chronic venous insufficiency with stasis dermatitis  Reviewed ultrasound, negative for DVTs  Rx: Triamcinolone  cream  Follow-up as needed    #3 Achilles tendinosis right  Discussed previous MRI findings with patient  Monitor symptoms  Currently no pain  Patient to avoid high impact activities  Follow-up as needed

## 2024-09-05 ENCOUNTER — APPOINTMENT (OUTPATIENT)
Dept: CARDIOLOGY | Facility: CLINIC | Age: 69
End: 2024-09-05
Payer: COMMERCIAL

## 2024-09-05 VITALS
SYSTOLIC BLOOD PRESSURE: 130 MMHG | OXYGEN SATURATION: 96 % | HEART RATE: 82 BPM | BODY MASS INDEX: 34.02 KG/M2 | WEIGHT: 192 LBS | HEIGHT: 63 IN | DIASTOLIC BLOOD PRESSURE: 68 MMHG

## 2024-09-05 DIAGNOSIS — R07.89 CHEST TIGHTNESS: ICD-10-CM

## 2024-09-05 DIAGNOSIS — I50.32 CHRONIC DIASTOLIC (CONGESTIVE) HEART FAILURE (MULTI): ICD-10-CM

## 2024-09-05 DIAGNOSIS — R05.2 SUBACUTE COUGH: ICD-10-CM

## 2024-09-05 DIAGNOSIS — R06.02 SHORTNESS OF BREATH: Primary | ICD-10-CM

## 2024-09-05 DIAGNOSIS — I48.20 CHRONIC ATRIAL FIBRILLATION (MULTI): ICD-10-CM

## 2024-09-05 DIAGNOSIS — I10 PRIMARY HYPERTENSION: ICD-10-CM

## 2024-09-05 DIAGNOSIS — I25.119 CORONARY ARTERY DISEASE INVOLVING NATIVE CORONARY ARTERY OF NATIVE HEART WITH ANGINA PECTORIS (CMS-HCC): ICD-10-CM

## 2024-09-05 DIAGNOSIS — R60.0 LEG EDEMA: ICD-10-CM

## 2024-09-05 PROBLEM — I11.9 HYPERTENSIVE HEART DISEASE WITHOUT CONGESTIVE HEART FAILURE: Status: RESOLVED | Noted: 2024-08-15 | Resolved: 2024-09-05

## 2024-09-05 PROCEDURE — 3048F LDL-C <100 MG/DL: CPT | Performed by: INTERNAL MEDICINE

## 2024-09-05 PROCEDURE — 99214 OFFICE O/P EST MOD 30 MIN: CPT | Performed by: INTERNAL MEDICINE

## 2024-09-05 PROCEDURE — 3078F DIAST BP <80 MM HG: CPT | Performed by: INTERNAL MEDICINE

## 2024-09-05 PROCEDURE — 1036F TOBACCO NON-USER: CPT | Performed by: INTERNAL MEDICINE

## 2024-09-05 PROCEDURE — 93010 ELECTROCARDIOGRAM REPORT: CPT | Performed by: INTERNAL MEDICINE

## 2024-09-05 PROCEDURE — 3075F SYST BP GE 130 - 139MM HG: CPT | Performed by: INTERNAL MEDICINE

## 2024-09-05 PROCEDURE — 93005 ELECTROCARDIOGRAM TRACING: CPT | Performed by: INTERNAL MEDICINE

## 2024-09-05 PROCEDURE — 3008F BODY MASS INDEX DOCD: CPT | Performed by: INTERNAL MEDICINE

## 2024-09-05 PROCEDURE — 3051F HG A1C>EQUAL 7.0%<8.0%: CPT | Performed by: INTERNAL MEDICINE

## 2024-09-05 PROCEDURE — 1159F MED LIST DOCD IN RCRD: CPT | Performed by: INTERNAL MEDICINE

## 2024-09-05 NOTE — PROGRESS NOTES
Subjective   Zeny Scott is a 69 y.o. female.    Chief Complaint:  Fatigue and generalized weakness.    HPI    Since her last visit her chest pain symptoms have improved.  Occasional episodes of palpitations.  Complains of fatigue and poor energy levels.  Has exertional dyspnea.    Cardiac catheterization performed on 2022 demonstrated 30 to 40% left anterior descending coronary artery stenosis with a normal circumflex and a normal right coronary artery. Medical therapy was recommended.     Her diagnosis of coronary artery disease is also based on a positive calcium score of 676 consistent with the presence of extensive atherosclerotic coronary artery disease.     Her diagnosis of atrial fibrillation dates back to 2017.  She has had multiple cardioversions.     She has a history of cirrhosis of the liver. She's also had a cholecystectomy. Has a history of elevated liver enzymes on statin therapy.     She is . Has 5 children one of which .  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      Allergies  Medication    · oxycodone            · Percocet TABS  Family History  Mother    · Family history of Alzheimer's disease (V17.2) (Z82.0)  Father    · Family history of cardiac disorder (V17.49) (Z82.49)   · Family history of kidney disease (V18.69) (Z84.1)     Social History  Problems    · Never smoker     Review of Systems   Constitutional: Positive for malaise/fatigue.   Cardiovascular:  Positive for chest pain and dyspnea on exertion.   Musculoskeletal:  Positive for arthritis and joint pain.   All other systems reviewed and are negative.      Current Outpatient Medications   Medication Sig Dispense Refill    amLODIPine (Norvasc) 5 mg tablet Take 1 tablet (5 mg) by mouth once daily. 90 tablet 3    atorvastatin (Lipitor) 40 mg tablet Take by mouth.      clotrimazole (Lotrimin) 1 % external solution       Eliquis 5 mg tablet Take 1 tablet (5 mg) by  "mouth 2 times a day.      Farxiga 10 mg TAKE ONE TABLET BY MOUTH EVERY DAY 90 tablet 3    FreeStyle Main 2 [place] misc use as directed to monitor blood glucose continuously      FreeStyle Main 2 Sensor kit use as directed and change every 14 days to monitor blood glucose continuously. use reader to scan at least 3 times per day      FreeStyle Precision Maninder Strips strip use as directed to monitor blood glucose three times daily      gabapentin (Neurontin) 100 mg capsule Take 1 capsule (100 mg) by mouth 2 times a day. 60 capsule 1    glimepiride (Amaryl) 4 mg tablet Take 1 tablet (4 mg) by mouth once daily in the morning. Take before meals.      LORazepam (Ativan) 1 mg tablet Take 1 tablet (1 mg) by mouth every 6 hours if needed.      metoprolol tartrate (Lopressor) 25 mg tablet Take 1 tablet (25 mg) by mouth 2 times a day.      metroNIDAZOLE (Flagyl) 500 mg tablet Take 1 tablet (500 mg) by mouth.      nitroglycerin (Nitrostat) 0.4 mg SL tablet Place 1 tablet (0.4 mg) under the tongue every 5 minutes if needed for chest pain.      omeprazole (PriLOSEC) 40 mg DR capsule TAKE ONE CAPSULE BY MOUTH EVERY DAY 30 capsule 0    torsemide (Demadex) 20 mg tablet Take 1 tablet (20 mg) by mouth once daily.      triamcinolone (Kenalog) 0.025 % ointment Apply topically 2 times a day. (Patient taking differently: Apply topically 2 times a day. As needed) 80 g 1     No current facility-administered medications for this visit.        Visit Vitals  /68 (BP Location: Left arm)   Pulse 82   Ht 1.6 m (5' 3\")   Wt 87.1 kg (192 lb)   SpO2 96%   BMI 34.01 kg/m²   Smoking Status Never   BSA 1.97 m²        Objective     Constitutional:       Appearance: Not in distress.   Neck:      Vascular: JVD normal.   Pulmonary:      Breath sounds: Normal breath sounds.   Cardiovascular:      Normal rate. Irregularly irregular rhythm. Normal S1. Normal S2.       Murmurs: There is no murmur.      No gallop.    Pulses:     Intact distal pulses. "   Edema:     Peripheral edema present.     Pretibial: bilateral 1+ edema of the pretibial area.  Abdominal:      General: There is no distension.      Palpations: Abdomen is soft.   Neurological:      Mental Status: Alert.         Lab Review:   Lab Results   Component Value Date     03/27/2024    K 4.2 03/27/2024     03/27/2024    CO2 25 03/27/2024    BUN 17 03/27/2024    CREATININE 0.66 03/27/2024    GLUCOSE 195 (H) 03/27/2024    CALCIUM 8.9 03/27/2024     Lab Results   Component Value Date    CHOL 129 02/12/2024    TRIG 127 02/12/2024    HDL 36.1 02/12/2024       Assessment:    1.  Atrial fibrillation.  Controlled ventricular response.  We have discussed with her in the past the possibility of a pulmonary vein isolation procedure.  She is not willing to consider.  Will refer to electrophysiology service.    2.  Hypertension.  Blood pressures are normal.    3.  Coronary artery disease.  The patient's stress thallium study done in March 2024 showed no evidence of ischemia with a left ventricular ejection fraction of 72%.    4.  Fatigue.  Probably multifactorial.  Not at all sure how much better she would feel if she were in sinus rhythm.

## 2024-09-06 ENCOUNTER — TELEPHONE (OUTPATIENT)
Dept: CARDIOLOGY | Facility: CLINIC | Age: 69
End: 2024-09-06
Payer: COMMERCIAL

## 2024-09-06 DIAGNOSIS — I48.20 CHRONIC ATRIAL FIBRILLATION (MULTI): ICD-10-CM

## 2024-09-06 NOTE — TELEPHONE ENCOUNTER
Pt called to report she forgot to inform Dr. Tee at yesterday's ov that she cannot afford Eliquis anymore. It will now cost $143/month. Pt states she is running low on tablets.    Pt has 8 tablets left.    Send referral to  Specialty? Other option Coumadin?    Please advise. Thanks.

## 2024-09-09 ENCOUNTER — TELEMEDICINE (OUTPATIENT)
Dept: PHARMACY | Facility: HOSPITAL | Age: 69
End: 2024-09-09
Payer: COMMERCIAL

## 2024-09-09 DIAGNOSIS — I48.20 CHRONIC ATRIAL FIBRILLATION (MULTI): ICD-10-CM

## 2024-09-09 RX ORDER — APIXABAN 5 MG/1
5 TABLET, FILM COATED ORAL 2 TIMES DAILY
Qty: 60 TABLET | Refills: 0 | Status: SHIPPED | OUTPATIENT
Start: 2024-09-09

## 2024-09-09 NOTE — PROGRESS NOTES
Patient ID: Zeny Scott is a 69 y.o. female who presents for urgent recommendations for Eliquis patient financial asssistance.      Subjective   Urgent Chief Complaint:   Patient will be out of her Eliquis before appointment with Cardiology Pharmacy can be set.      Objective     There were no vitals taken for this visit.   BP Readings from Last 4 Encounters:   09/05/24 130/68   08/22/24 126/80   08/14/24 145/63   05/10/24 129/76      There were no vitals filed for this visit.     Current Outpatient Medications   Medication Instructions    amLODIPine (NORVASC) 5 mg, oral, Daily    atorvastatin (Lipitor) 40 mg tablet oral    clotrimazole (Lotrimin) 1 % external solution     Eliquis 5 mg, oral, 2 times daily    Farxiga 10 mg, oral, Daily    FreeStyle Main 2 [place] misc use as directed to monitor blood glucose continuously    FreeStyle Main 2 Sensor kit use as directed and change every 14 days to monitor blood glucose continuously. use reader to scan at least 3 times per day    FreeStyle Precision Maninder Strips strip use as directed to monitor blood glucose three times daily    gabapentin (NEURONTIN) 100 mg, oral, 2 times daily    glimepiride (AMARYL) 4 mg, oral, Daily before breakfast    LORazepam (ATIVAN) 1 mg, oral, Every 6 hours PRN    metoprolol tartrate (LOPRESSOR) 25 mg, oral, 2 times daily    metroNIDAZOLE (FLAGYL) 500 mg, oral    nitroglycerin (NITROSTAT) 0.4 mg, sublingual, Every 5 min PRN    omeprazole (PRILOSEC) 40 mg, oral, Daily    torsemide (DEMADEX) 20 mg, oral, Daily    triamcinolone (Kenalog) 0.025 % ointment Topical, 2 times daily         Assessment/Plan   Spoke with patient regarding financial documents needed for the  patient assistance program.  She has 5 days of Eliquis left.  Sending a 30-day supply to local [facility] with free voucher applied.  Patient unable to remember if voucher was ever applied as she has been on Eliquis for quite some time now. Will send a script to [facility] for  patient assistance program as well.    Follow-up: as needed    Alyssa Nelson, PharmD    Continue all meds under the continuation of care with the referring provider and clinical pharmacy team.

## 2024-09-12 DIAGNOSIS — I48.11 LONGSTANDING PERSISTENT ATRIAL FIBRILLATION (MULTI): ICD-10-CM

## 2024-09-12 NOTE — TELEPHONE ENCOUNTER
Referral sent to  Specialty pharmacy for Eliquis celso Aguirre. Pt currently taking Eliquis 5mg twice a day, new rx not needed at this time.

## 2024-09-16 ENCOUNTER — APPOINTMENT (OUTPATIENT)
Dept: RADIOLOGY | Facility: HOSPITAL | Age: 69
End: 2024-09-16
Payer: COMMERCIAL

## 2024-09-19 ENCOUNTER — HOSPITAL ENCOUNTER (OUTPATIENT)
Dept: RADIOLOGY | Facility: CLINIC | Age: 69
Discharge: HOME | End: 2024-09-19
Payer: COMMERCIAL

## 2024-09-19 DIAGNOSIS — B18.2 CHRONIC HEPATITIS C WITH CIRRHOSIS (MULTI): ICD-10-CM

## 2024-09-19 DIAGNOSIS — K74.60 CHRONIC HEPATITIS C WITH CIRRHOSIS (MULTI): ICD-10-CM

## 2024-09-19 PROCEDURE — 76705 ECHO EXAM OF ABDOMEN: CPT | Performed by: RADIOLOGY

## 2024-09-19 PROCEDURE — 76705 ECHO EXAM OF ABDOMEN: CPT

## 2024-09-26 PROBLEM — I48.20 CHRONIC ATRIAL FIBRILLATION (MULTI): Chronic | Status: ACTIVE | Noted: 2023-03-07

## 2024-09-27 ENCOUNTER — ANCILLARY PROCEDURE (OUTPATIENT)
Dept: CARDIOLOGY | Facility: CLINIC | Age: 69
End: 2024-09-27
Payer: COMMERCIAL

## 2024-09-27 ENCOUNTER — LAB (OUTPATIENT)
Dept: LAB | Facility: LAB | Age: 69
End: 2024-09-27
Payer: COMMERCIAL

## 2024-09-27 ENCOUNTER — OFFICE VISIT (OUTPATIENT)
Dept: CARDIOLOGY | Facility: CLINIC | Age: 69
End: 2024-09-27
Payer: COMMERCIAL

## 2024-09-27 VITALS
HEIGHT: 63 IN | DIASTOLIC BLOOD PRESSURE: 84 MMHG | WEIGHT: 196 LBS | HEART RATE: 75 BPM | OXYGEN SATURATION: 95 % | BODY MASS INDEX: 34.73 KG/M2 | SYSTOLIC BLOOD PRESSURE: 142 MMHG

## 2024-09-27 DIAGNOSIS — B18.2 CHRONIC HEPATITIS C WITH CIRRHOSIS (MULTI): ICD-10-CM

## 2024-09-27 DIAGNOSIS — I48.20 CHRONIC ATRIAL FIBRILLATION (MULTI): Primary | ICD-10-CM

## 2024-09-27 DIAGNOSIS — R05.2 SUBACUTE COUGH: ICD-10-CM

## 2024-09-27 DIAGNOSIS — R06.02 SHORTNESS OF BREATH: ICD-10-CM

## 2024-09-27 DIAGNOSIS — I25.119 CORONARY ARTERY DISEASE INVOLVING NATIVE CORONARY ARTERY OF NATIVE HEART WITH ANGINA PECTORIS: ICD-10-CM

## 2024-09-27 DIAGNOSIS — K74.60 CHRONIC HEPATITIS C WITH CIRRHOSIS (MULTI): ICD-10-CM

## 2024-09-27 DIAGNOSIS — I48.20 CHRONIC ATRIAL FIBRILLATION (MULTI): ICD-10-CM

## 2024-09-27 LAB
AFP SERPL-MCNC: <4 NG/ML (ref 0–9)
ALBUMIN SERPL BCP-MCNC: 3.8 G/DL (ref 3.4–5)
ALP SERPL-CCNC: 133 U/L (ref 33–136)
ALT SERPL W P-5'-P-CCNC: 19 U/L (ref 7–45)
ANION GAP SERPL CALC-SCNC: 10 MMOL/L (ref 10–20)
AST SERPL W P-5'-P-CCNC: 18 U/L (ref 9–39)
BASOPHILS # BLD AUTO: 0.01 X10*3/UL (ref 0–0.1)
BASOPHILS NFR BLD AUTO: 0.3 %
BILIRUB SERPL-MCNC: 0.5 MG/DL (ref 0–1.2)
BODY SURFACE AREA: 1.99 M2
BUN SERPL-MCNC: 18 MG/DL (ref 6–23)
CALCIUM SERPL-MCNC: 8.8 MG/DL (ref 8.6–10.3)
CHLORIDE SERPL-SCNC: 107 MMOL/L (ref 98–107)
CO2 SERPL-SCNC: 26 MMOL/L (ref 21–32)
CREAT SERPL-MCNC: 0.48 MG/DL (ref 0.5–1.05)
EGFRCR SERPLBLD CKD-EPI 2021: >90 ML/MIN/1.73M*2
EOSINOPHIL # BLD AUTO: 0.09 X10*3/UL (ref 0–0.7)
EOSINOPHIL NFR BLD AUTO: 2.5 %
ERYTHROCYTE [DISTWIDTH] IN BLOOD BY AUTOMATED COUNT: 13.2 % (ref 11.5–14.5)
GLUCOSE SERPL-MCNC: 194 MG/DL (ref 74–99)
HCT VFR BLD AUTO: 40.5 % (ref 36–46)
HGB BLD-MCNC: 12.9 G/DL (ref 12–16)
IMM GRANULOCYTES # BLD AUTO: 0.01 X10*3/UL (ref 0–0.7)
IMM GRANULOCYTES NFR BLD AUTO: 0.3 % (ref 0–0.9)
INR PPP: 1.1 (ref 0.9–1.1)
LYMPHOCYTES # BLD AUTO: 1.27 X10*3/UL (ref 1.2–4.8)
LYMPHOCYTES NFR BLD AUTO: 35.5 %
MCH RBC QN AUTO: 26.6 PG (ref 26–34)
MCHC RBC AUTO-ENTMCNC: 31.9 G/DL (ref 32–36)
MCV RBC AUTO: 84 FL (ref 80–100)
MONOCYTES # BLD AUTO: 0.31 X10*3/UL (ref 0.1–1)
MONOCYTES NFR BLD AUTO: 8.7 %
NEUTROPHILS # BLD AUTO: 1.89 X10*3/UL (ref 1.2–7.7)
NEUTROPHILS NFR BLD AUTO: 52.7 %
NRBC BLD-RTO: 0 /100 WBCS (ref 0–0)
PLATELET # BLD AUTO: 161 X10*3/UL (ref 150–450)
POTASSIUM SERPL-SCNC: 3.9 MMOL/L (ref 3.5–5.3)
PROT SERPL-MCNC: 6.3 G/DL (ref 6.4–8.2)
PROTHROMBIN TIME: 12.5 SECONDS (ref 9.8–12.8)
RBC # BLD AUTO: 4.85 X10*6/UL (ref 4–5.2)
SODIUM SERPL-SCNC: 139 MMOL/L (ref 136–145)
WBC # BLD AUTO: 3.6 X10*3/UL (ref 4.4–11.3)

## 2024-09-27 PROCEDURE — 85025 COMPLETE CBC W/AUTO DIFF WBC: CPT

## 2024-09-27 PROCEDURE — 36415 COLL VENOUS BLD VENIPUNCTURE: CPT

## 2024-09-27 PROCEDURE — 93242 EXT ECG>48HR<7D RECORDING: CPT

## 2024-09-27 PROCEDURE — 82105 ALPHA-FETOPROTEIN SERUM: CPT

## 2024-09-27 PROCEDURE — 99214 OFFICE O/P EST MOD 30 MIN: CPT | Performed by: INTERNAL MEDICINE

## 2024-09-27 PROCEDURE — 85610 PROTHROMBIN TIME: CPT

## 2024-09-27 PROCEDURE — 80053 COMPREHEN METABOLIC PANEL: CPT

## 2024-09-27 NOTE — LETTER
2024     Javier Fisher DO  [address]  [address]  [place] [facility] [place] [address]    Patient: Zeny Scott   YOB: 1955   Date of Visit: 2024       Dear Dr. Javier Fisher DO:    Thank you for referring Zeny Scott to me for evaluation. Below are my notes for this consultation.  If you have questions, please do not hesitate to call me. I look forward to following your patient along with you.       Sincerely,     James C Ramicone, DO      CC: Kirk Tee MD  ______________________________________________________________________________________    Reason For Consult    Atrial fibrillation    History Of Present Illness    This is a 69-year-old female with persistent atrial fibrillation.  She is being seen today in consultation at the request of Dr. Tee.  The patient has been feeling more short of breath on exertion.  She has a long history of atrial fibrillation and has had cardioversions in the past.  She reports having 3 cardioversions, but only maintained sinus rhythm for 1 or 2 days after the procedure.  She is on metoprolol, but feels lightheaded at times.  Available cardiology records reviewed.    Past medical history:    Coronary artery disease  Longstanding persistent atrial fibrillation  Diabetes mellitus  Hepatic cirrhosis  Cholecystectomy  Hypertension    Past surgical history: Cholecystectomy   x 2    Social history: Non-smoker    Family history: Positive for coronary artery disease     Review of systems  Other review of systems negative other than as outlined in HPI     Social History  She reports that she has never smoked. She has never used smokeless tobacco. She reports that she does not currently use alcohol. She reports that she does not use drugs.    Family History  Family History   Problem Relation Name Age of Onset   • Alzheimer's disease Mother     • Other (cardiac disorder) Father     • Kidney disease Father     • Hypertension Father    "  • Liver cancer Brother       Allergies  Oxycodone, Oxycodone-acetaminophen, and Nickel    Medications  Current Outpatient Medications   Medication Instructions   • amLODIPine (NORVASC) 5 mg, oral, Daily   • atorvastatin (Lipitor) 40 mg tablet oral   • clotrimazole (Lotrimin) 1 % external solution    • Eliquis 5 mg, oral, 2 times daily   • Farxiga 10 mg, oral, Daily   • FreeStyle Main 2 [place] misc use as directed to monitor blood glucose continuously   • FreeStyle Main 2 Sensor kit use as directed and change every 14 days to monitor blood glucose continuously. use reader to scan at least 3 times per day   • FreeStyle Precision Maninder Strips strip use as directed to monitor blood glucose three times daily   • gabapentin (NEURONTIN) 100 mg, oral, 2 times daily   • glimepiride (AMARYL) 4 mg, oral, Daily before breakfast   • LORazepam (ATIVAN) 1 mg, oral, Every 6 hours PRN   • metoprolol tartrate (LOPRESSOR) 25 mg, oral, 2 times daily   • metroNIDAZOLE (FLAGYL) 500 mg, oral   • nitroglycerin (NITROSTAT) 0.4 mg, sublingual, Every 5 min PRN   • omeprazole (PRILOSEC) 40 mg, oral, Daily   • torsemide (DEMADEX) 20 mg, oral, Daily   • triamcinolone (Kenalog) 0.025 % ointment Topical, 2 times daily     Vitals      3/5/2024    12:45 PM 3/18/2024     9:59 AM 5/10/2024    10:07 AM 8/14/2024    11:15 AM 8/20/2024     8:44 AM 8/22/2024     1:29 PM 9/5/2024     1:58 PM   Vitals   Systolic 144  129 145  126 130   Diastolic 84  76 63  80 68   Heart Rate 78  79 65  73 82   Temp    26.1 °C (79 °F)      Resp    18      Height (in) 1.6 m (5' 3\")  1.6 m (5' 3\") 1.6 m (5' 3\") 1.6 m (5' 3\")  1.6 m (5' 3\")   Weight (lb) 205 200 208 194 194 195 192   BMI 36.31 kg/m2 35.43 kg/m2 36.85 kg/m2 34.37 kg/m2 34.37 kg/m2 34.54 kg/m2 34.01 kg/m2   BSA (m2) 2.03 m2 2.01 m2 2.05 m2 1.98 m2 1.98 m2 1.98 m2 1.97 m2   Visit Report Report  Report  Report Report Report     Physical Exam    General Appearance:  Alert, oriented, no distress  Skin:  Warm and " dry  Head and Neck:  No elevation of JVP, no carotid bruits  Cardiac Exam:  Rhythm is irregular, S1 and S2 are normal, no murmur S3 or S4  Lungs:  Clear to auscultation  Extremities:  no edema  Neurologic:  No focal deficits  Psychiatric:  Appropriate mood and behavior    Test Results    Nuclear stress test March 2024: No ischemia, normal left ventricular function  Echocardiogram 2023: Ejection fraction 55 to 60%.  EKG review: Atrial fibrillation has been present on all EKGs since January 2019.  Assessment/Plan  Problem List Items Addressed This Visit             ICD-10-CM    Chronic atrial fibrillation (Multi) - Primary (Chronic) I48.20     1.  Permanent atrial fibrillation:  Zeny has a long history of atrial fibrillation dating back to at least 2017.  I reviewed all of her EKGs back to 2016.  The last EKG showing sinus rhythm was January 7, 2016.  All EKGs since then have shown atrial fibrillation.  The shortness of breath might be related to periods of RVR.  I am also concerned that she is bradycardic at times when she feels unsteady on higher doses of metoprolol.  An extended Holter monitor is recommended and will be applied today.  She is not a candidate for aggressive rhythm control strategies such as antiarrhythmic drug therapy and catheter ablation due to the duration of this arrhythmia.  She has been in atrial fibrillation since at least January 2019 and I do not feel that we can achieve and maintain sinus rhythm.  If she does have evidence of tachy-nolan syndrome, we could consider a pacemaker insertion and then titrate AV godwin blocking medications for rate control.  The patient will be following up with me after completion of the Holter monitor and further treatment recommendations will be made at that time.         Relevant Orders    Holter or Event Cardiac Monitor     James C Ramicone, DO

## 2024-09-27 NOTE — PROGRESS NOTES
Reason For Consult    Atrial fibrillation    History Of Present Illness    This is a 69-year-old female with persistent atrial fibrillation.  She is being seen today in consultation at the request of Dr. Tee.  The patient has been feeling more short of breath on exertion.  She has a long history of atrial fibrillation and has had cardioversions in the past.  She reports having 3 cardioversions, but only maintained sinus rhythm for 1 or 2 days after the procedure.  She is on metoprolol, but feels lightheaded at times.  Available cardiology records reviewed.    Past medical history:    Coronary artery disease  Longstanding persistent atrial fibrillation  Diabetes mellitus  Hepatic cirrhosis  Cholecystectomy  Hypertension    Past surgical history: Cholecystectomy   x 2    Social history: Non-smoker    Family history: Positive for coronary artery disease     Review of systems  Other review of systems negative other than as outlined in HPI     Social History  She reports that she has never smoked. She has never used smokeless tobacco. She reports that she does not currently use alcohol. She reports that she does not use drugs.    Family History  Family History   Problem Relation Name Age of Onset    Alzheimer's disease Mother      Other (cardiac disorder) Father      Kidney disease Father      Hypertension Father      Liver cancer Brother       Allergies  Oxycodone, Oxycodone-acetaminophen, and Nickel    Medications  Current Outpatient Medications   Medication Instructions    amLODIPine (NORVASC) 5 mg, oral, Daily    atorvastatin (Lipitor) 40 mg tablet oral    clotrimazole (Lotrimin) 1 % external solution     Eliquis 5 mg, oral, 2 times daily    Farxiga 10 mg, oral, Daily    FreeStyle Main 2 [place] misc use as directed to monitor blood glucose continuously    FreeStyle Main 2 Sensor kit use as directed and change every 14 days to monitor blood glucose continuously. use reader to scan at least 3 times per day  "   FreeStyle Precision Maninder Strips strip use as directed to monitor blood glucose three times daily    gabapentin (NEURONTIN) 100 mg, oral, 2 times daily    glimepiride (AMARYL) 4 mg, oral, Daily before breakfast    LORazepam (ATIVAN) 1 mg, oral, Every 6 hours PRN    metoprolol tartrate (LOPRESSOR) 25 mg, oral, 2 times daily    metroNIDAZOLE (FLAGYL) 500 mg, oral    nitroglycerin (NITROSTAT) 0.4 mg, sublingual, Every 5 min PRN    omeprazole (PRILOSEC) 40 mg, oral, Daily    torsemide (DEMADEX) 20 mg, oral, Daily    triamcinolone (Kenalog) 0.025 % ointment Topical, 2 times daily     Vitals      3/5/2024    12:45 PM 3/18/2024     9:59 AM 5/10/2024    10:07 AM 8/14/2024    11:15 AM 8/20/2024     8:44 AM 8/22/2024     1:29 PM 9/5/2024     1:58 PM   Vitals   Systolic 144  129 145  126 130   Diastolic 84  76 63  80 68   Heart Rate 78  79 65  73 82   Temp    26.1 °C (79 °F)      Resp    18      Height (in) 1.6 m (5' 3\")  1.6 m (5' 3\") 1.6 m (5' 3\") 1.6 m (5' 3\")  1.6 m (5' 3\")   Weight (lb) 205 200 208 194 194 195 192   BMI 36.31 kg/m2 35.43 kg/m2 36.85 kg/m2 34.37 kg/m2 34.37 kg/m2 34.54 kg/m2 34.01 kg/m2   BSA (m2) 2.03 m2 2.01 m2 2.05 m2 1.98 m2 1.98 m2 1.98 m2 1.97 m2   Visit Report Report  Report  Report Report Report     Physical Exam    General Appearance:  Alert, oriented, no distress  Skin:  Warm and dry  Head and Neck:  No elevation of JVP, no carotid bruits  Cardiac Exam:  Rhythm is irregular, S1 and S2 are normal, no murmur S3 or S4  Lungs:  Clear to auscultation  Extremities:  no edema  Neurologic:  No focal deficits  Psychiatric:  Appropriate mood and behavior    Test Results    Nuclear stress test March 2024: No ischemia, normal left ventricular function  Echocardiogram 2023: Ejection fraction 55 to 60%.  EKG review: Atrial fibrillation has been present on all EKGs since January 2019.  Assessment/Plan  Problem List Items Addressed This Visit             ICD-10-CM    Chronic atrial fibrillation (Multi) - " Primary (Chronic) I48.20     1.  Permanent atrial fibrillation:  Zeny has a long history of atrial fibrillation dating back to at least 2017.  I reviewed all of her EKGs back to 2016.  The last EKG showing sinus rhythm was January 7, 2016.  All EKGs since then have shown atrial fibrillation.  The shortness of breath might be related to periods of RVR.  I am also concerned that she is bradycardic at times when she feels unsteady on higher doses of metoprolol.  An extended Holter monitor is recommended and will be applied today.  She is not a candidate for aggressive rhythm control strategies such as antiarrhythmic drug therapy and catheter ablation due to the duration of this arrhythmia.  She has been in atrial fibrillation since at least January 2019 and I do not feel that we can achieve and maintain sinus rhythm.  If she does have evidence of tachy-nolan syndrome, we could consider a pacemaker insertion and then titrate AV godwin blocking medications for rate control.  The patient will be following up with me after completion of the Holter monitor and further treatment recommendations will be made at that time.         Relevant Orders    Holter or Event Cardiac Monitor     James C Ramicone, DO

## 2024-09-27 NOTE — ASSESSMENT & PLAN NOTE
1.  Permanent atrial fibrillation:  Zeny has a long history of atrial fibrillation dating back to at least 2017.  I reviewed all of her EKGs back to 2016.  The last EKG showing sinus rhythm was January 7, 2016.  All EKGs since then have shown atrial fibrillation.  The shortness of breath might be related to periods of RVR.  I am also concerned that she is bradycardic at times when she feels unsteady on higher doses of metoprolol.  An extended Holter monitor is recommended and will be applied today.  She is not a candidate for aggressive rhythm control strategies such as antiarrhythmic drug therapy and catheter ablation due to the duration of this arrhythmia.  She has been in atrial fibrillation since at least January 2019 and I do not feel that we can achieve and maintain sinus rhythm.  If she does have evidence of tachy-nolan syndrome, we could consider a pacemaker insertion and then titrate AV godwin blocking medications for rate control.  The patient will be following up with me after completion of the Holter monitor and further treatment recommendations will be made at that time.

## 2024-09-30 ENCOUNTER — APPOINTMENT (OUTPATIENT)
Dept: NEUROLOGY | Facility: CLINIC | Age: 69
End: 2024-09-30
Payer: COMMERCIAL

## 2024-09-30 VITALS
SYSTOLIC BLOOD PRESSURE: 132 MMHG | DIASTOLIC BLOOD PRESSURE: 62 MMHG | HEART RATE: 89 BPM | BODY MASS INDEX: 34.55 KG/M2 | WEIGHT: 195 LBS | HEIGHT: 63 IN

## 2024-09-30 DIAGNOSIS — G60.3 IDIOPATHIC PROGRESSIVE NEUROPATHY: ICD-10-CM

## 2024-09-30 DIAGNOSIS — E11.42 DIABETIC POLYNEUROPATHY ASSOCIATED WITH TYPE 2 DIABETES MELLITUS (MULTI): Primary | ICD-10-CM

## 2024-09-30 PROCEDURE — 3008F BODY MASS INDEX DOCD: CPT | Performed by: PSYCHIATRY & NEUROLOGY

## 2024-09-30 PROCEDURE — 1159F MED LIST DOCD IN RCRD: CPT | Performed by: PSYCHIATRY & NEUROLOGY

## 2024-09-30 PROCEDURE — 3075F SYST BP GE 130 - 139MM HG: CPT | Performed by: PSYCHIATRY & NEUROLOGY

## 2024-09-30 PROCEDURE — 99214 OFFICE O/P EST MOD 30 MIN: CPT | Performed by: PSYCHIATRY & NEUROLOGY

## 2024-09-30 PROCEDURE — 3048F LDL-C <100 MG/DL: CPT | Performed by: PSYCHIATRY & NEUROLOGY

## 2024-09-30 PROCEDURE — 3051F HG A1C>EQUAL 7.0%<8.0%: CPT | Performed by: PSYCHIATRY & NEUROLOGY

## 2024-09-30 PROCEDURE — 3078F DIAST BP <80 MM HG: CPT | Performed by: PSYCHIATRY & NEUROLOGY

## 2024-09-30 RX ORDER — GABAPENTIN 100 MG/1
200 CAPSULE ORAL NIGHTLY
Qty: 60 CAPSULE | Refills: 3 | Status: SHIPPED | OUTPATIENT
Start: 2024-09-30 | End: 2025-01-28

## 2024-09-30 ASSESSMENT — ENCOUNTER SYMPTOMS
NUMBNESS: 1
DIZZINESS: 0
LIGHT-HEADEDNESS: 0
WEAKNESS: 0
ARTHRALGIAS: 1

## 2024-09-30 NOTE — PROGRESS NOTES
"Chief Complaint Zeny Scott \"Zeny Scott\" is a 69 y.o. female who presents for follow up for neuropathy    HPI:  Pt states that her neuropathy continues to bother her mainly at night. That is when she takes the 100  mg of gabapentin otherwise she would be tired from the medication throughout the day. Patient stated that she did have a fall at a rest area last year and didn't even know how she fell. Endorses that she often has issues with her balance. Has a pain in her knee from osteoarthritis that she plans on eventually getting a knee replacement.      ROS:  Review of Systems   Musculoskeletal:  Positive for arthralgias and gait problem.   Neurological:  Positive for numbness. Negative for dizziness, syncope, weakness and light-headedness.       Meds:    Current Outpatient Medications:     amLODIPine (Norvasc) 5 mg tablet, Take 1 tablet (5 mg) by mouth once daily., Disp: 90 tablet, Rfl: 3    atorvastatin (Lipitor) 40 mg tablet, Take by mouth., Disp: , Rfl:     clotrimazole (Lotrimin) 1 % external solution, , Disp: , Rfl:     Eliquis 5 mg tablet, Take 1 tablet (5 mg) by mouth 2 times a day., Disp: 60 tablet, Rfl: 0    Farxiga 10 mg, TAKE ONE TABLET BY MOUTH EVERY DAY, Disp: 90 tablet, Rfl: 3    FreeStyle Main 2 [place] misc, use as directed to monitor blood glucose continuously, Disp: , Rfl:     FreeStyle Main 2 Sensor kit, use as directed and change every 14 days to monitor blood glucose continuously. use reader to scan at least 3 times per day, Disp: , Rfl:     FreeStyle Precision Maninder Strips strip, use as directed to monitor blood glucose three times daily, Disp: , Rfl:     gabapentin (Neurontin) 100 mg capsule, Take 1 capsule (100 mg) by mouth 2 times a day., Disp: 60 capsule, Rfl: 1    glimepiride (Amaryl) 4 mg tablet, Take 1 tablet (4 mg) by mouth once daily in the morning. Take before meals., Disp: , Rfl:     LORazepam (Ativan) 1 mg tablet, Take 1 tablet (1 mg) by mouth every 6 hours if needed., Disp: " ", Rfl:     metoprolol tartrate (Lopressor) 25 mg tablet, Take 1 tablet (25 mg) by mouth 2 times a day., Disp: , Rfl:     nitroglycerin (Nitrostat) 0.4 mg SL tablet, Place 1 tablet (0.4 mg) under the tongue every 5 minutes if needed for chest pain., Disp: , Rfl:     omeprazole (PriLOSEC) 40 mg DR capsule, TAKE ONE CAPSULE BY MOUTH EVERY DAY, Disp: 30 capsule, Rfl: 0    triamcinolone (Kenalog) 0.025 % ointment, Apply topically 2 times a day. (Patient taking differently: Apply topically 2 times a day. As needed), Disp: 80 g, Rfl: 1    Allergies:  Allergies   Allergen Reactions    Oxycodone Shortness of breath    Oxycodone-Acetaminophen Shortness of breath    Nickel Unknown       PE:  Visit Vitals  /62 (BP Location: Left arm, Patient Position: Sitting, BP Cuff Size: Large adult)   Pulse 89   Ht 1.6 m (5' 3\")   Wt 88.5 kg (195 lb)   BMI 34.54 kg/m²   Smoking Status Never   BSA 1.98 m²     Physical Exam  Neurological:      Mental Status: She is alert and oriented to person, place, and time.      Sensory: Sensory deficit present.      Motor: No weakness.      Gait: Gait abnormal.      Deep Tendon Reflexes:      Reflex Scores:       Patellar reflexes are 2+ on the right side.       Achilles reflexes are 0 on the right side and 0 on the left side.     Comments: Distal loss of pinprick of feet and lower leg         Assessment/Plan  Zeny Scott \"Zeny Oliveros" is a 69 y.o. female who presents for follow up for neuropathy. Discussed her neuropathy and its treatment. Recommended that the patient increase her gabapentin to 200 mg nightly due to the severity of her neuropathic pain at night to see if that will help. With her recent falls and issues walking recommended that she receive some balance therapy. Patient agreed with treatment and plan. Referral to therapy was placed. Refill for gabapentin was sent to preferred pharmacy.    Zeny \"Zeny Oliveros" was seen today for peripheral neuropathy.  Diagnoses and all " orders for this visit:  Diabetic polyneuropathy associated with type 2 diabetes mellitus (Multi) (Primary)    Patient was staffed with Dr. Chery Najera DO, PGY-3  [facility]

## 2024-10-01 PROBLEM — E11.42 DIABETIC POLYNEUROPATHY ASSOCIATED WITH TYPE 2 DIABETES MELLITUS (MULTI): Status: ACTIVE | Noted: 2024-10-01

## 2024-10-01 PROBLEM — G60.3 IDIOPATHIC PROGRESSIVE NEUROPATHY: Status: ACTIVE | Noted: 2024-10-01

## 2024-10-02 ENCOUNTER — APPOINTMENT (OUTPATIENT)
Dept: DERMATOLOGY | Facility: CLINIC | Age: 69
End: 2024-10-02
Payer: COMMERCIAL

## 2024-10-02 ENCOUNTER — TELEPHONE (OUTPATIENT)
Dept: CARDIOLOGY | Facility: CLINIC | Age: 69
End: 2024-10-02

## 2024-10-02 DIAGNOSIS — L57.0 ACTINIC KERATOSIS: ICD-10-CM

## 2024-10-02 DIAGNOSIS — R06.02 SHORTNESS OF BREATH: ICD-10-CM

## 2024-10-02 DIAGNOSIS — L82.1 SEBORRHEIC KERATOSIS: ICD-10-CM

## 2024-10-02 DIAGNOSIS — I87.2 VENOUS STASIS DERMATITIS OF BOTH LOWER EXTREMITIES: Primary | ICD-10-CM

## 2024-10-02 DIAGNOSIS — Z85.828 PERSONAL HISTORY OF OTHER MALIGNANT NEOPLASM OF SKIN: ICD-10-CM

## 2024-10-02 DIAGNOSIS — L21.9 SEBORRHEIC DERMATITIS: ICD-10-CM

## 2024-10-02 DIAGNOSIS — D48.5 NEOPLASM OF UNCERTAIN BEHAVIOR OF SKIN: ICD-10-CM

## 2024-10-02 DIAGNOSIS — L29.9 PRURITUS: ICD-10-CM

## 2024-10-02 DIAGNOSIS — I50.32 CHRONIC DIASTOLIC (CONGESTIVE) HEART FAILURE: Primary | ICD-10-CM

## 2024-10-02 DIAGNOSIS — L57.8 PHOTOAGING OF SKIN: ICD-10-CM

## 2024-10-02 DIAGNOSIS — L81.4 LENTIGO: ICD-10-CM

## 2024-10-02 PROCEDURE — 11102 TANGNTL BX SKIN SINGLE LES: CPT | Performed by: STUDENT IN AN ORGANIZED HEALTH CARE EDUCATION/TRAINING PROGRAM

## 2024-10-02 PROCEDURE — 3048F LDL-C <100 MG/DL: CPT | Performed by: DERMATOLOGY

## 2024-10-02 PROCEDURE — 99214 OFFICE O/P EST MOD 30 MIN: CPT | Performed by: DERMATOLOGY

## 2024-10-02 PROCEDURE — 3051F HG A1C>EQUAL 7.0%<8.0%: CPT | Performed by: DERMATOLOGY

## 2024-10-02 PROCEDURE — 1036F TOBACCO NON-USER: CPT | Performed by: DERMATOLOGY

## 2024-10-02 PROCEDURE — 1159F MED LIST DOCD IN RCRD: CPT | Performed by: DERMATOLOGY

## 2024-10-02 RX ORDER — KETOCONAZOLE 20 MG/ML
SHAMPOO, SUSPENSION TOPICAL EVERY OTHER DAY
Qty: 120 ML | Refills: 2 | Status: SHIPPED | OUTPATIENT
Start: 2024-10-02

## 2024-10-02 RX ORDER — KETOCONAZOLE 20 MG/G
CREAM TOPICAL 2 TIMES DAILY
Qty: 60 G | Refills: 2 | Status: SHIPPED | OUTPATIENT
Start: 2024-10-02

## 2024-10-02 RX ORDER — TRIAMCINOLONE ACETONIDE 1 MG/G
CREAM TOPICAL
Qty: 453.6 G | Refills: 0 | Status: SHIPPED | OUTPATIENT
Start: 2024-10-02

## 2024-10-02 ASSESSMENT — DERMATOLOGY QUALITY OF LIFE (QOL) ASSESSMENT
ARE THERE EXCLUSIONS OR EXCEPTIONS FOR THE QUALITY OF LIFE ASSESSMENT: NO
WHAT SINGLE SKIN CONDITION LISTED BELOW IS THE PATIENT ANSWERING THE QUALITY-OF-LIFE ASSESSMENT QUESTIONS ABOUT: ACTINIC KERATOSIS
RATE HOW BOTHERED YOU ARE BY SYMPTOMS OF YOUR SKIN PROBLEM (EG, ITCHING, STINGING BURNING, HURTING OR SKIN IRRITATION): 1
RATE HOW BOTHERED YOU ARE BY EFFECTS OF YOUR SKIN PROBLEMS ON YOUR ACTIVITIES (EG, GOING OUT, ACCOMPLISHING WHAT YOU WANT, WORK ACTIVITIES OR YOUR RELATIONSHIPS WITH OTHERS): 1
RATE HOW EMOTIONALLY BOTHERED YOU ARE BY YOUR SKIN PROBLEM (FOR EXAMPLE, WORRY, EMBARRASSMENT, FRUSTRATION): 1

## 2024-10-02 ASSESSMENT — DERMATOLOGY PATIENT ASSESSMENT
DO YOU HAVE ANY NEW OR CHANGING LESIONS: YES
ARE YOU ON BIRTH CONTROL: NO
ARE YOU TRYING TO GET PREGNANT: NO
ARE YOU AN ORGAN TRANSPLANT RECIPIENT: NO
DO YOU USE A TANNING BED: NO
WHERE ARE THESE NEW OR CHANGING LESIONS LOCATED: FACE, BACK
DO YOU USE SUNSCREEN: OCCASIONALLY
FOR PATIENTS COMING IN FOR A FOLLOW-UP VISIT - HAVE THERE BEEN ANY CHANGES IN YOUR HEALTH SINCE YOUR LAST VISIT: ALL IN MYCHART

## 2024-10-02 ASSESSMENT — PATIENT GLOBAL ASSESSMENT (PGA): PATIENT GLOBAL ASSESSMENT: PATIENT GLOBAL ASSESSMENT:  2 - MILD

## 2024-10-02 ASSESSMENT — ITCH NUMERIC RATING SCALE: HOW SEVERE IS YOUR ITCHING?: 2

## 2024-10-02 NOTE — TELEPHONE ENCOUNTER
Pt called states has had increased sob and now wheezing. Pt currently taking Torsemide 20mg daily and was asking if you could order Blood work ?

## 2024-10-02 NOTE — PROGRESS NOTES
Subjective     Zeny Scott is a 69 y.o. female who presents for the following: Skin Check (Pt here today for 6 month FBSE. Hx of BCC and AKs. LV- 5fu prescribed for cheeks-never used had too many events. Concerns on face(multiple dry, scaly lesions) and back(lesion is very itchy).).     Has been using ketoconazole and triple antibiotic ointment for about 1 week on itchy spots on her legs and arms.  Denies any recent changes to her skincare or yardwork.    Review of Systems:  No other skin or systemic complaints other than what is documented elsewhere in the note.    The following portions of the chart were reviewed this encounter and updated as appropriate:         Skin Cancer History  No skin cancer on file.      Specialty Problems          Dermatology Problems    Actinic keratosis    Angioma    Dermatitis    Hemangioma of skin and subcutaneous tissue    Intertrigo    Lentigines    Melanocytic nevi of other parts of face    Melanocytic nevi of scalp and neck    Melanocytic nevi of trunk    Melanocytic nevi of unspecified lower limb, including hip    Melanocytic nevi of unspecified upper limb, including shoulder    Neoplasm of uncertain behavior of skin    Onychodystrophy    Seborrheic dermatitis    Skin changes due to chronic exposure to nonionizing radiation, unspecified    Ear itch    Otomycosis of right ear    Asteatosis cutis    History of skin cancer     History of BCC on left lateral neck diagnosed and treated by her previous outside Dermatologist, Dr. Bruno, in approximately 1990             Objective   Well appearing patient in no apparent distress; mood and affect are within normal limits.    A full examination was performed including scalp, head, eyes, ears, nose, lips, neck, chest, axillae, abdomen, back, buttocks, bilateral upper extremities, bilateral lower extremities, hands, feet, fingers, toes, fingernails, and toenails. All findings within normal limits unless otherwise noted  below.    Assessment/Plan   1. Venous stasis dermatitis of both lower extremities  Left Lower Leg - Anterior, Right Lower Leg - Anterior  Vesicular patches on the bilateral lower extremities, L>R, with pitting edema of the bilateral lower legs    The nature of the diagnosis was reviewed with the patient today.  Discoloration and swelling related to valve dysfunction, subsequent backflow, resulting in fluid into the tissues.  The discoloration is unlikely to fade over time.  Recommend the use of daily compression stockings - thigh high.    Start topical triamcinolone 0.1% cream. Patient to apply 2x daily x 2 wks then decrease to 2x/day 2 days per week. Can repeat full 2 week course as often as every 6-8 weeks as needed for flaring. Side effects of topical steroids includes, but is not limited to skin atrophy and dyspigmentation.    Reassess in 6-8 weeks.          Related Procedures  Follow Up In Dermatology - Established Patient    Related Medications  triamcinolone (Kenalog) 0.025 % ointment  Apply topically 2 times a day.    triamcinolone (Kenalog) 0.1 % cream  Apply topically 2 times a day for 14 days, then reduce frequency to twice a week.  Repeat as needed, but do not use more than 2 weeks per month.    2. Pruritus  Few erythematous excoriated papules, lichenified papule and the right dorsal forearm    Pruritus likely secondary to id reaction from stasis dermatitis vs. Dry skin vs. Neuropathic.    Reviewed etiologies of itch. Favor multifactorial causes for patient.    Can start triamcinolone 0.1% cream for itchy areas. Patient to apply 2x daily x 2 wks then decrease to 2x/day 2 days per week. Can repeat full 2 week course as often as every 6-8 weeks as needed for flaring. Side effects of topical steroids includes, but is not limited to skin atrophy and dyspigmentation.    Encouraged gentle skin care regimen.  A gentle skin care regimen includes:  -washing with a mild soap without fragrance such as Dove for  sensitive skin, Cetaphil or Cerave.  -pat dry after washing and then apply a thick moisturizer such as Cerave cream or Cetaphil cream. Creams are thicker than lotions and often do a better job of restoring the skin barrier.      3. Neoplasm of uncertain behavior of skin  Left 2nd Finger Metacarpophalangeal Joint  Hyperkeratotic macule with excoriations          Lesion biopsy  Type of biopsy: tangential    Informed consent: discussed and consent obtained    Timeout: patient name, date of birth, surgical site, and procedure verified    Procedure prep:  Patient was prepped and draped  Anesthesia: the lesion was anesthetized in a standard fashion    Anesthetic:  1% lidocaine w/ epinephrine 1-100,000 local infiltration  Instrument used: DermaBlade    Hemostasis achieved with: aluminum chloride and electrodesiccation    Outcome: patient tolerated procedure well    Post-procedure details: sterile dressing applied and wound care instructions given    Dressing type: petrolatum and bandage      Staff Communication: Dermatology Local Anesthesia: 1 % Lidocaine / Epinephrine - Amount:    Specimen 1 - Dermatopathology- DERM LAB  Differential Diagnosis: lichenoid keratosis vs AK vs SCC  Check Margins Yes/No?:    Comments:    Dermpath Lab: Routine Histopathology (formalin-fixed tissue)    Lesion concerning for AK vs lichenoid keratosis. The need for biopsy for definitive diagnosis recommended. Risks and benefits reviewed, see procedure note.    4. Photoaging of skin  Mottled pigmentation with telangiectasias and brown reticular macules in sun exposed areas of the body.    The risk of chronic, cumulative sun damage and risk of development of skin cancer was reviewed today.   The importance of sun protection was reviewed: including the use of a broad spectrum sunscreen that protects against both UVA/UVB rays, with ingredients such as Zinc oxide or titanium dioxide, wearing sun protective clothing and sun avoidance. We reviewed the  warning signs of non-melanoma skin cancer and ABCDEs of melanoma  Please follow up should you notice any new or changing pre-existing skin lesion.    Related Procedures  Follow Up In Dermatology - Established Patient    5. Seborrheic dermatitis  Nose, medial chest  Erythema with overlying greasy scale.    The chronic and intermittently flaring nature of this skin condition was discussed with patient today.   This is due to a yeast that everyone has on their skin that results in redness, dryness and in some patients itching.    Various treatment options were reviewed including topical antifungals and topical steroids depending upon severity and symptoms. Patient advised we cannot cure this condition, but it can be controlled.     - Continue ketoconazole 2% cream, apply to affected areas on the chest and alar crease of nose   - Can also start ketoconazole 2% shampoo, lather for 3-5 minutes every other day on the chest, face then rinse    ketoconazole (NIZOral) 2 % cream - Nose, medial chest  Apply topically 2 times a day.    ketoconazole (NIZOral) 2 % shampoo - Nose, medial chest  Apply topically every other day.    6. Personal history of other malignant neoplasm of skin  Left Anterior Neck  Well healed scar at site of prior treatment without evidence of recurrence.    There is no evidence of recurrence on clinical examination today, reassurance was provided to the patient. The importance of sun protection was reviewed with the patient including the use of a broad spectrum sunscreen that protects against both UVA/UVB rays, with ingredients such as Zinc oxide or titanium dioxide, wearing sun protective clothing and sun avoidance. Warning signs of non-melanoma skin cancer were reviewed. ABCDEs of melanoma reviewed. Patient to f/u should they notice any new or changing pre-existing skin lesion    Related Procedures  Follow Up In Dermatology - Established Patient    7. Lentigo  Scattered tan macules in sun-exposed  areas.    These are benign skin lesions due to sun exposure. They will darken in response to sun exposure. They should be monitored for change in size, shape or color.  These lesions can be treated cosmetically with topical creams, liquid nitrogen and a variety of lasers.    8. Seborrheic keratosis  Brown, tan waxy macules and stuck on appearing papules and plaques    The benign nature of these skin lesions reviewed, reassure provided and no further treatment needed at this time.   These lesions can be removed, if symptomatic (itching, bleeding, rubbing on clothing, painful), otherwise removal is considered cosmetic.           Follow up in 1-2 months for Stasis dermatitis and pruritus.    Rubén North MD    I was present for the entirety of the procedure(s).  I saw and evaluated the patient. I personally obtained the key and critical portions of the history and physical exam or was physically present for key and critical portions performed by the resident/fellow. I reviewed the resident/fellow's documentation and discussed the patient with the resident/fellow. I agree with the resident/fellow's medical decision making as documented in the note.    Liliana Martínez, DO

## 2024-10-03 PROBLEM — R80.9 MICROALBUMINURIA: Status: ACTIVE | Noted: 2024-09-30

## 2024-10-03 PROBLEM — E04.9 GOITER: Status: ACTIVE | Noted: 2024-09-28

## 2024-10-04 LAB
LABORATORY COMMENT REPORT: NORMAL
PATH REPORT.FINAL DX SPEC: NORMAL
PATH REPORT.GROSS SPEC: NORMAL
PATH REPORT.RELEVANT HX SPEC: NORMAL
PATH REPORT.TOTAL CANCER: NORMAL

## 2024-10-07 DIAGNOSIS — C44.619 BASAL CELL CARCINOMA (BCC) OF LEFT HAND: Primary | ICD-10-CM

## 2024-10-09 ENCOUNTER — LAB (OUTPATIENT)
Dept: LAB | Facility: LAB | Age: 69
End: 2024-10-09
Payer: COMMERCIAL

## 2024-10-09 DIAGNOSIS — R06.02 SHORTNESS OF BREATH: ICD-10-CM

## 2024-10-09 DIAGNOSIS — I50.32 CHRONIC DIASTOLIC (CONGESTIVE) HEART FAILURE: ICD-10-CM

## 2024-10-09 LAB
ANION GAP SERPL CALC-SCNC: 10 MMOL/L (ref 10–20)
BNP SERPL-MCNC: 173 PG/ML (ref 0–99)
BUN SERPL-MCNC: 20 MG/DL (ref 6–23)
CALCIUM SERPL-MCNC: 8.5 MG/DL (ref 8.6–10.3)
CHLORIDE SERPL-SCNC: 101 MMOL/L (ref 98–107)
CO2 SERPL-SCNC: 33 MMOL/L (ref 21–32)
CREAT SERPL-MCNC: 0.46 MG/DL (ref 0.5–1.05)
EGFRCR SERPLBLD CKD-EPI 2021: >90 ML/MIN/1.73M*2
GLUCOSE SERPL-MCNC: 295 MG/DL (ref 74–99)
POTASSIUM SERPL-SCNC: 3.4 MMOL/L (ref 3.5–5.3)
SODIUM SERPL-SCNC: 141 MMOL/L (ref 136–145)

## 2024-10-09 PROCEDURE — 36415 COLL VENOUS BLD VENIPUNCTURE: CPT

## 2024-10-09 PROCEDURE — 80048 BASIC METABOLIC PNL TOTAL CA: CPT

## 2024-10-09 PROCEDURE — 83880 ASSAY OF NATRIURETIC PEPTIDE: CPT

## 2024-10-10 ENCOUNTER — TELEPHONE (OUTPATIENT)
Dept: CARDIOLOGY | Facility: CLINIC | Age: 69
End: 2024-10-10

## 2024-10-10 ENCOUNTER — APPOINTMENT (OUTPATIENT)
Dept: PHARMACY | Facility: HOSPITAL | Age: 69
End: 2024-10-10
Payer: COMMERCIAL

## 2024-10-10 DIAGNOSIS — I48.20 CHRONIC ATRIAL FIBRILLATION (MULTI): ICD-10-CM

## 2024-10-10 LAB — BODY SURFACE AREA: 1.99 M2

## 2024-10-10 NOTE — TELEPHONE ENCOUNTER
Patient is taking her diuretic.  She said her abdomen is distended and SOB. Feet/Ankles are also swollen.  She is sleeping in a chair.  She is saying she fell on her chest in July and was bed ridden while on vacation that's how bad it hurt.  She did go to the hospital when it happened and they did a CT scan that came back ok.  She feels like something is wrong in her chest. She feels pressure.

## 2024-10-10 NOTE — TELEPHONE ENCOUNTER
----- Message from Kirk Tee sent at 10/10/2024  8:15 AM EDT -----  Potassium levels are slightly low.  Blood sugar increased 295. ?taking diuretic.  Does she have peripheral edema?

## 2024-10-11 DIAGNOSIS — I50.32 CHRONIC DIASTOLIC (CONGESTIVE) HEART FAILURE: ICD-10-CM

## 2024-10-11 DIAGNOSIS — R06.02 SHORTNESS OF BREATH: ICD-10-CM

## 2024-10-11 DIAGNOSIS — I25.10 ATHEROSCLEROSIS OF NATIVE CORONARY ARTERY WITHOUT ANGINA PECTORIS, UNSPECIFIED WHETHER NATIVE OR TRANSPLANTED HEART: ICD-10-CM

## 2024-10-14 ENCOUNTER — APPOINTMENT (OUTPATIENT)
Dept: CARDIOLOGY | Facility: CLINIC | Age: 69
End: 2024-10-14
Payer: COMMERCIAL

## 2024-10-14 ENCOUNTER — HOSPITAL ENCOUNTER (OUTPATIENT)
Dept: CARDIOLOGY | Facility: CLINIC | Age: 69
Discharge: HOME | End: 2024-10-14
Payer: COMMERCIAL

## 2024-10-14 VITALS — WEIGHT: 198 LBS | HEIGHT: 63 IN | BODY MASS INDEX: 35.08 KG/M2

## 2024-10-14 DIAGNOSIS — I48.91 UNSPECIFIED ATRIAL FIBRILLATION (MULTI): ICD-10-CM

## 2024-10-14 DIAGNOSIS — I25.119 CORONARY ARTERY DISEASE INVOLVING NATIVE CORONARY ARTERY OF NATIVE HEART WITH ANGINA PECTORIS: ICD-10-CM

## 2024-10-14 DIAGNOSIS — I25.10 ATHEROSCLEROSIS OF NATIVE CORONARY ARTERY WITHOUT ANGINA PECTORIS, UNSPECIFIED WHETHER NATIVE OR TRANSPLANTED HEART: ICD-10-CM

## 2024-10-14 DIAGNOSIS — R06.02 SHORTNESS OF BREATH: ICD-10-CM

## 2024-10-14 DIAGNOSIS — I50.32 CHRONIC DIASTOLIC (CONGESTIVE) HEART FAILURE: ICD-10-CM

## 2024-10-14 DIAGNOSIS — I48.20 CHRONIC ATRIAL FIBRILLATION (MULTI): Chronic | ICD-10-CM

## 2024-10-14 DIAGNOSIS — I10 PRIMARY HYPERTENSION: Primary | ICD-10-CM

## 2024-10-14 DIAGNOSIS — R60.0 LEG EDEMA: ICD-10-CM

## 2024-10-14 LAB
AORTIC VALVE MEAN GRADIENT: 4.4 MMHG
AORTIC VALVE PEAK VELOCITY: 1.48 M/S
AV PEAK GRADIENT: 8.8 MMHG
AVA (PEAK VEL): 2.18 CM2
AVA (VTI): 2.78 CM2
EJECTION FRACTION APICAL 4 CHAMBER: 52.2
EJECTION FRACTION: 60 %
LEFT ATRIUM VOLUME AREA LENGTH INDEX BSA: 62.8 ML/M2
LEFT VENTRICLE INTERNAL DIMENSION DIASTOLE: 4.85 CM (ref 3.5–6)
LEFT VENTRICULAR OUTFLOW TRACT DIAMETER: 2.01 CM
RIGHT VENTRICLE FREE WALL PEAK S': 0.09 CM/S
RIGHT VENTRICLE PEAK SYSTOLIC PRESSURE: 50.8 MMHG
TRICUSPID ANNULAR PLANE SYSTOLIC EXCURSION: 2.2 CM

## 2024-10-14 PROCEDURE — 3051F HG A1C>EQUAL 7.0%<8.0%: CPT | Performed by: INTERNAL MEDICINE

## 2024-10-14 PROCEDURE — 93306 TTE W/DOPPLER COMPLETE: CPT

## 2024-10-14 PROCEDURE — 93306 TTE W/DOPPLER COMPLETE: CPT | Performed by: INTERNAL MEDICINE

## 2024-10-14 PROCEDURE — 99213 OFFICE O/P EST LOW 20 MIN: CPT | Performed by: INTERNAL MEDICINE

## 2024-10-14 PROCEDURE — 1159F MED LIST DOCD IN RCRD: CPT | Performed by: INTERNAL MEDICINE

## 2024-10-14 PROCEDURE — 3048F LDL-C <100 MG/DL: CPT | Performed by: INTERNAL MEDICINE

## 2024-10-14 PROCEDURE — 4010F ACE/ARB THERAPY RXD/TAKEN: CPT | Performed by: INTERNAL MEDICINE

## 2024-10-14 PROCEDURE — 3008F BODY MASS INDEX DOCD: CPT | Performed by: INTERNAL MEDICINE

## 2024-10-14 RX ORDER — LOSARTAN POTASSIUM 50 MG/1
1 TABLET ORAL
COMMUNITY
Start: 2024-09-25

## 2024-10-14 RX ORDER — METFORMIN HYDROCHLORIDE 500 MG/1
500 TABLET, EXTENDED RELEASE ORAL
COMMUNITY
Start: 2024-09-03

## 2024-10-14 RX ORDER — METHIMAZOLE 5 MG/1
5 TABLET ORAL
COMMUNITY
Start: 2024-09-30

## 2024-10-14 RX ORDER — SPIRONOLACTONE 25 MG/1
25 TABLET ORAL DAILY
Qty: 30 TABLET | Refills: 11 | Status: SHIPPED | OUTPATIENT
Start: 2024-10-14 | End: 2025-10-14

## 2024-10-14 NOTE — PROGRESS NOTES
Subjective   Zeny Scott is a 69 y.o. female.    Chief Complaint:  Follow-up fatigue weakness and lower extremity edema.    HPI    This is a telephone visit follow-up for this patient who is unable to make it to our office and [place].  She is here for follow-up of fatigue weakness dyspnea and lower extremity edema.    Cardiac catheterization performed on 2022 demonstrated 30 to 40% left anterior descending coronary artery stenosis with a normal circumflex and a normal right coronary artery. Medical therapy was recommended.     Her diagnosis of coronary artery disease is also based on a positive calcium score of 676 consistent with the presence of extensive atherosclerotic coronary artery disease.     Her diagnosis of atrial fibrillation dates back to 2017.  She has had multiple cardioversions.     She has a history of cirrhosis of the liver. She's also had a cholecystectomy. Has a history of elevated liver enzymes on statin therapy.     She is . Has 5 children one of which .  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      Allergies  Medication    · oxycodone            · Percocet TABS  Family History  Mother    · Family history of Alzheimer's disease (V17.2) (Z82.0)  Father    · Family history of cardiac disorder (V17.49) (Z82.49)   · Family history of kidney disease (V18.69) (Z84.1)     Social History  Problems    · Never smoker     Review of Systems   Constitutional: Positive for malaise/fatigue.   Cardiovascular:  Positive for chest pain and dyspnea on exertion.   Musculoskeletal:  Positive for arthritis and joint pain.   All other systems reviewed and are negative.    Current Outpatient Medications   Medication Sig Dispense Refill    amLODIPine (Norvasc) 5 mg tablet Take 1 tablet (5 mg) by mouth once daily. 90 tablet 3    atorvastatin (Lipitor) 40 mg tablet Take by mouth.      clotrimazole (Lotrimin) 1 % external solution        Eliquis 5 mg tablet Take 1 tablet (5 mg) by mouth 2 times a day. 60 tablet 0    Farxiga 10 mg TAKE ONE TABLET BY MOUTH EVERY DAY 90 tablet 3    FreeStyle Main 2 [place] misc use as directed to monitor blood glucose continuously      FreeStyle Main 2 Sensor kit use as directed and change every 14 days to monitor blood glucose continuously. use reader to scan at least 3 times per day      FreeStyle Precision Maninder Strips strip use as directed to monitor blood glucose three times daily      gabapentin (Neurontin) 100 mg capsule Take 2 capsules (200 mg) by mouth once daily at bedtime. 60 capsule 3    glimepiride (Amaryl) 4 mg tablet Take 1 tablet (4 mg) by mouth once daily in the morning. Take before meals.      ketoconazole (NIZOral) 2 % cream Apply topically 2 times a day. 60 g 2    ketoconazole (NIZOral) 2 % shampoo Apply topically every other day. 120 mL 2    LORazepam (Ativan) 1 mg tablet Take 1 tablet (1 mg) by mouth every 6 hours if needed.      metoprolol tartrate (Lopressor) 25 mg tablet Take 1 tablet (25 mg) by mouth 2 times a day.      nitroglycerin (Nitrostat) 0.4 mg SL tablet Place 1 tablet (0.4 mg) under the tongue every 5 minutes if needed for chest pain.      omeprazole (PriLOSEC) 40 mg DR capsule TAKE ONE CAPSULE BY MOUTH EVERY DAY 30 capsule 0    triamcinolone (Kenalog) 0.025 % ointment Apply topically 2 times a day. (Patient taking differently: Apply topically 2 times a day. As needed) 80 g 1    triamcinolone (Kenalog) 0.1 % cream Apply topically 2 times a day for 14 days, then reduce frequency to twice a week.  Repeat as needed, but do not use more than 2 weeks per month. 453.6 g 0     No current facility-administered medications for this visit.        Lab Review:   Lab Results   Component Value Date     10/09/2024    K 3.4 (L) 10/09/2024     10/09/2024    CO2 33 (H) 10/09/2024    BUN 20 10/09/2024    CREATININE 0.46 (L) 10/09/2024    GLUCOSE 295 (H) 10/09/2024    CALCIUM 8.5 (L)  10/09/2024       Assessment:    1.  Atrial fibrillation.  Controlled ventricular response.  We have made a referral to the electrophysiology service for the possibility of a pulmonary vein isolation procedure.  Not sure that she is a candidate.    2.  Coronary artery disease.  Her most recent stress thallium study in March 2024 showed no ischemia with normal left ventricular function.    3.  Lower extremity edema.  Some right heart failure as a result of pulmonary hypertension and moderate tricuspid regurgitation.  Will add spironolactone.  Latest potassium is 3.4.

## 2024-10-21 ENCOUNTER — HOSPITAL ENCOUNTER (EMERGENCY)
Facility: HOSPITAL | Age: 69
Discharge: HOME | End: 2024-10-21
Payer: COMMERCIAL

## 2024-10-21 ENCOUNTER — APPOINTMENT (OUTPATIENT)
Dept: RADIOLOGY | Facility: HOSPITAL | Age: 69
End: 2024-10-21
Payer: COMMERCIAL

## 2024-10-21 ENCOUNTER — APPOINTMENT (OUTPATIENT)
Dept: CARDIOLOGY | Facility: HOSPITAL | Age: 69
End: 2024-10-21
Payer: COMMERCIAL

## 2024-10-21 VITALS
HEIGHT: 63 IN | BODY MASS INDEX: 35.08 KG/M2 | RESPIRATION RATE: 23 BRPM | HEART RATE: 84 BPM | WEIGHT: 198 LBS | DIASTOLIC BLOOD PRESSURE: 70 MMHG | SYSTOLIC BLOOD PRESSURE: 168 MMHG | TEMPERATURE: 96.8 F | OXYGEN SATURATION: 97 %

## 2024-10-21 DIAGNOSIS — B34.9 VIRAL ILLNESS: ICD-10-CM

## 2024-10-21 DIAGNOSIS — R06.02 SHORTNESS OF BREATH: Primary | ICD-10-CM

## 2024-10-21 LAB
ALBUMIN SERPL BCP-MCNC: 4 G/DL (ref 3.4–5)
ALP SERPL-CCNC: 140 U/L (ref 33–136)
ALT SERPL W P-5'-P-CCNC: 16 U/L (ref 7–45)
ANION GAP SERPL CALC-SCNC: 10 MMOL/L (ref 10–20)
AST SERPL W P-5'-P-CCNC: 17 U/L (ref 9–39)
BASOPHILS # BLD AUTO: 0.02 X10*3/UL (ref 0–0.1)
BASOPHILS NFR BLD AUTO: 0.3 %
BILIRUB SERPL-MCNC: 0.9 MG/DL (ref 0–1.2)
BNP SERPL-MCNC: 234 PG/ML (ref 0–99)
BUN SERPL-MCNC: 17 MG/DL (ref 6–23)
CALCIUM SERPL-MCNC: 9.4 MG/DL (ref 8.6–10.3)
CARDIAC TROPONIN I PNL SERPL HS: 7 NG/L (ref 0–13)
CHLORIDE SERPL-SCNC: 103 MMOL/L (ref 98–107)
CO2 SERPL-SCNC: 28 MMOL/L (ref 21–32)
CREAT SERPL-MCNC: 0.41 MG/DL (ref 0.5–1.05)
D DIMER PPP FEU-MCNC: 1117 NG/ML FEU
EGFRCR SERPLBLD CKD-EPI 2021: >90 ML/MIN/1.73M*2
EOSINOPHIL # BLD AUTO: 0.07 X10*3/UL (ref 0–0.7)
EOSINOPHIL NFR BLD AUTO: 1.1 %
ERYTHROCYTE [DISTWIDTH] IN BLOOD BY AUTOMATED COUNT: 13.8 % (ref 11.5–14.5)
FLUAV RNA RESP QL NAA+PROBE: NOT DETECTED
FLUBV RNA RESP QL NAA+PROBE: NOT DETECTED
GLUCOSE SERPL-MCNC: 172 MG/DL (ref 74–99)
HCT VFR BLD AUTO: 38.6 % (ref 36–46)
HGB BLD-MCNC: 12.3 G/DL (ref 12–16)
IMM GRANULOCYTES # BLD AUTO: 0.02 X10*3/UL (ref 0–0.7)
IMM GRANULOCYTES NFR BLD AUTO: 0.3 % (ref 0–0.9)
LYMPHOCYTES # BLD AUTO: 1.34 X10*3/UL (ref 1.2–4.8)
LYMPHOCYTES NFR BLD AUTO: 21 %
MCH RBC QN AUTO: 26.3 PG (ref 26–34)
MCHC RBC AUTO-ENTMCNC: 31.9 G/DL (ref 32–36)
MCV RBC AUTO: 83 FL (ref 80–100)
MONOCYTES # BLD AUTO: 0.47 X10*3/UL (ref 0.1–1)
MONOCYTES NFR BLD AUTO: 7.4 %
NEUTROPHILS # BLD AUTO: 4.47 X10*3/UL (ref 1.2–7.7)
NEUTROPHILS NFR BLD AUTO: 69.9 %
NRBC BLD-RTO: 0 /100 WBCS (ref 0–0)
PLATELET # BLD AUTO: 188 X10*3/UL (ref 150–450)
POTASSIUM SERPL-SCNC: 3.6 MMOL/L (ref 3.5–5.3)
PROT SERPL-MCNC: 7.4 G/DL (ref 6.4–8.2)
RBC # BLD AUTO: 4.67 X10*6/UL (ref 4–5.2)
RSV RNA RESP QL NAA+PROBE: NOT DETECTED
SARS-COV-2 RNA RESP QL NAA+PROBE: NOT DETECTED
SODIUM SERPL-SCNC: 137 MMOL/L (ref 136–145)
WBC # BLD AUTO: 6.4 X10*3/UL (ref 4.4–11.3)

## 2024-10-21 PROCEDURE — 99285 EMERGENCY DEPT VISIT HI MDM: CPT

## 2024-10-21 PROCEDURE — 36415 COLL VENOUS BLD VENIPUNCTURE: CPT

## 2024-10-21 PROCEDURE — 84484 ASSAY OF TROPONIN QUANT: CPT | Performed by: PHYSICIAN ASSISTANT

## 2024-10-21 PROCEDURE — 36415 COLL VENOUS BLD VENIPUNCTURE: CPT | Performed by: PHYSICIAN ASSISTANT

## 2024-10-21 PROCEDURE — 85025 COMPLETE CBC W/AUTO DIFF WBC: CPT | Performed by: PHYSICIAN ASSISTANT

## 2024-10-21 PROCEDURE — 2550000001 HC RX 255 CONTRASTS

## 2024-10-21 PROCEDURE — 85379 FIBRIN DEGRADATION QUANT: CPT

## 2024-10-21 PROCEDURE — 71275 CT ANGIOGRAPHY CHEST: CPT

## 2024-10-21 PROCEDURE — 83880 ASSAY OF NATRIURETIC PEPTIDE: CPT | Performed by: PHYSICIAN ASSISTANT

## 2024-10-21 PROCEDURE — 87634 RSV DNA/RNA AMP PROBE: CPT | Performed by: PHYSICIAN ASSISTANT

## 2024-10-21 PROCEDURE — 71275 CT ANGIOGRAPHY CHEST: CPT | Performed by: RADIOLOGY

## 2024-10-21 PROCEDURE — 93005 ELECTROCARDIOGRAM TRACING: CPT

## 2024-10-21 PROCEDURE — 71046 X-RAY EXAM CHEST 2 VIEWS: CPT

## 2024-10-21 PROCEDURE — 94640 AIRWAY INHALATION TREATMENT: CPT

## 2024-10-21 PROCEDURE — 80053 COMPREHEN METABOLIC PANEL: CPT | Performed by: PHYSICIAN ASSISTANT

## 2024-10-21 PROCEDURE — 87636 SARSCOV2 & INF A&B AMP PRB: CPT | Performed by: PHYSICIAN ASSISTANT

## 2024-10-21 PROCEDURE — 2500000002 HC RX 250 W HCPCS SELF ADMINISTERED DRUGS (ALT 637 FOR MEDICARE OP, ALT 636 FOR OP/ED)

## 2024-10-21 PROCEDURE — 71046 X-RAY EXAM CHEST 2 VIEWS: CPT | Performed by: RADIOLOGY

## 2024-10-21 RX ORDER — BENZONATATE 100 MG/1
100 CAPSULE ORAL EVERY 8 HOURS
Qty: 21 CAPSULE | Refills: 0 | Status: SHIPPED | OUTPATIENT
Start: 2024-10-21 | End: 2024-10-28

## 2024-10-21 RX ORDER — IPRATROPIUM BROMIDE AND ALBUTEROL SULFATE 2.5; .5 MG/3ML; MG/3ML
3 SOLUTION RESPIRATORY (INHALATION)
Status: DISCONTINUED | OUTPATIENT
Start: 2024-10-21 | End: 2024-10-21

## 2024-10-21 RX ORDER — IPRATROPIUM BROMIDE AND ALBUTEROL SULFATE 2.5; .5 MG/3ML; MG/3ML
3 SOLUTION RESPIRATORY (INHALATION) ONCE
Status: COMPLETED | OUTPATIENT
Start: 2024-10-21 | End: 2024-10-21

## 2024-10-21 ASSESSMENT — COLUMBIA-SUICIDE SEVERITY RATING SCALE - C-SSRS
2. HAVE YOU ACTUALLY HAD ANY THOUGHTS OF KILLING YOURSELF?: NO
1. IN THE PAST MONTH, HAVE YOU WISHED YOU WERE DEAD OR WISHED YOU COULD GO TO SLEEP AND NOT WAKE UP?: NO
6. HAVE YOU EVER DONE ANYTHING, STARTED TO DO ANYTHING, OR PREPARED TO DO ANYTHING TO END YOUR LIFE?: NO

## 2024-10-21 ASSESSMENT — LIFESTYLE VARIABLES
EVER HAD A DRINK FIRST THING IN THE MORNING TO STEADY YOUR NERVES TO GET RID OF A HANGOVER: NO
HAVE YOU EVER FELT YOU SHOULD CUT DOWN ON YOUR DRINKING: NO
HAVE PEOPLE ANNOYED YOU BY CRITICIZING YOUR DRINKING: NO
EVER FELT BAD OR GUILTY ABOUT YOUR DRINKING: NO
TOTAL SCORE: 0

## 2024-10-21 NOTE — ED PROVIDER NOTES
THIS IS MY TENA SUPERVISORY AND SHARED VISIT NOTE:    I personally saw the patient and made/approved the management plan and take responsibility for the patient management.    History: Patient is a 69-year-old female who presents today with chief complaint shortness of breath, patient had worsening shortness of breath x 1 week, its gotten acutely worse over the last night, patient feels like she is unable to catch her breath, has a dry cough, she recently taking care of her grandson who is sick.  Patient she also has some chest tightness across the front of her chest, as well as some pain through her shoulder blades.  Patient is a non-smoker, patient's mother had a history of asthma,    Exam: GENERAL APPEARANCE: Awake and alert.   HEENT: Normocephalic, atraumatic. Extraocular muscles are intact. Pupils equal round and reactive to light.  CHEST: Nontender to palpation. Clear to auscultation bilaterally. No rales, rhonchi, or wheezing.   HEART: S1, S2. Regular rate and rhythm. No murmurs, gallops or rubs.  Strong and equal pulses in the extremities.   ABDOMEN: Soft,.  non-tender.  No rebound or guarding, bowel sounds normal x 4 quadrants  NEUROLOGICAL: Awake, alert and oriented x 3.       MDM: Patient seen and evaluated at bedside, patient is in no acute distress.  I will order a CBC, CMP, BNP, twelve-lead, RSV, flu, COVID, D-dimer, troponin. Differential diagnosis includes but is not limited to viral illness, PE,.  Patient does have an elevated D-dimer, I will order a CT angio PE study, this report is as below, patient be discharged home, specific viral illness, discussed symptomatic management, return precautions were discussed, she agreed with discharge plan.    Diagnosis: Viral illness, shortness of breath,  CT angio chest for pulmonary embolism   Final Result   VERY MILD INTERSTITIAL EDEMA IN THE LUNG APICES AND LUNG BASES        NO OTHER ACUTE DISEASE IN THE CHEST        NO ALVEOLAR EDEMA        HEART SIZE AT  LEAST BORDERLINE, WITH PROMINENCE OF BOTH ATRIA        NO PLEURAL OR PERICARDIAL EFFUSION        NO GROUND-GLASS AIRSPACE DISEASE OR CONSOLIDATION        NO PNEUMOTHORAX        NO ACUTE PULMONARY EMBOLISM THROUGH THE SEGMENTAL BRANCH LEVEL        NO AORTIC DISSECTION OR OTHER ACUTE THORACIC AORTIC FINDINGS        MACRO:   None        Signed by: Eduardo Graves 10/21/2024 11:13 AM   Dictation workstation:   [id number]      XR chest 2 views   Final Result   1.  No evidence of acute cardiopulmonary process.                  MACRO:   None        Signed by: Pasha Peters 10/21/2024 8:35 AM   Dictation workstation:   [id number]        Results for orders placed or performed during the hospital encounter of 10/21/24   CBC and Auto Differential    Collection Time: 10/21/24  8:30 AM   Result Value Ref Range    WBC 6.4 4.4 - 11.3 x10*3/uL    nRBC 0.0 0.0 - 0.0 /100 WBCs    RBC 4.67 4.00 - 5.20 x10*6/uL    Hemoglobin 12.3 12.0 - 16.0 g/dL    Hematocrit 38.6 36.0 - 46.0 %    MCV 83 80 - 100 fL    MCH 26.3 26.0 - 34.0 pg    MCHC 31.9 (L) 32.0 - 36.0 g/dL    RDW 13.8 11.5 - 14.5 %    Platelets 188 150 - 450 x10*3/uL    Neutrophils % 69.9 40.0 - 80.0 %    Immature Granulocytes %, Automated 0.3 0.0 - 0.9 %    Lymphocytes % 21.0 13.0 - 44.0 %    Monocytes % 7.4 2.0 - 10.0 %    Eosinophils % 1.1 0.0 - 6.0 %    Basophils % 0.3 0.0 - 2.0 %    Neutrophils Absolute 4.47 1.20 - 7.70 x10*3/uL    Immature Granulocytes Absolute, Automated 0.02 0.00 - 0.70 x10*3/uL    Lymphocytes Absolute 1.34 1.20 - 4.80 x10*3/uL    Monocytes Absolute 0.47 0.10 - 1.00 x10*3/uL    Eosinophils Absolute 0.07 0.00 - 0.70 x10*3/uL    Basophils Absolute 0.02 0.00 - 0.10 x10*3/uL   Comprehensive metabolic panel    Collection Time: 10/21/24  8:30 AM   Result Value Ref Range    Glucose 172 (H) 74 - 99 mg/dL    Sodium 137 136 - 145 mmol/L    Potassium 3.6 3.5 - 5.3 mmol/L    Chloride 103 98 - 107 mmol/L    Bicarbonate 28 21 - 32 mmol/L    Anion Gap 10 10 - 20 mmol/L     Urea Nitrogen 17 6 - 23 mg/dL    Creatinine 0.41 (L) 0.50 - 1.05 mg/dL    eGFR >90 >60 mL/min/1.73m*2    Calcium 9.4 8.6 - 10.3 mg/dL    Albumin 4.0 3.4 - 5.0 g/dL    Alkaline Phosphatase 140 (H) 33 - 136 U/L    Total Protein 7.4 6.4 - 8.2 g/dL    AST 17 9 - 39 U/L    Bilirubin, Total 0.9 0.0 - 1.2 mg/dL    ALT 16 7 - 45 U/L   Troponin I, High Sensitivity    Collection Time: 10/21/24  8:30 AM   Result Value Ref Range    Troponin I, High Sensitivity 7 0 - 13 ng/L   B-Type Natriuretic Peptide    Collection Time: 10/21/24  8:30 AM   Result Value Ref Range     (H) 0 - 99 pg/mL   Sars-CoV-2 PCR    Collection Time: 10/21/24  8:31 AM   Result Value Ref Range    Coronavirus 2019, PCR Not Detected Not Detected   Influenza A, and B PCR    Collection Time: 10/21/24  8:31 AM   Result Value Ref Range    Flu A Result Not Detected Not Detected    Flu B Result Not Detected Not Detected   RSV PCR    Collection Time: 10/21/24  8:31 AM   Result Value Ref Range    RSV PCR Not Detected Not Detected   D-Dimer, VTE Exclusion    Collection Time: 10/21/24  9:16 AM   Result Value Ref Range    D-Dimer, Quantitative VTE Exclusion 1,117 (H) <=500 ng/mL FEU         Please see TENA note for further details    Sections of this report were created using voice-to-text technology and may contain errors in translation    Harshil Garzon DO  Emergency Medicine       Harshil Garzon DO  10/21/24 0910

## 2024-10-21 NOTE — DISCHARGE INSTRUCTIONS
Please follow-up with PCP in 2 to 3 days for reassessment of your symptoms.  You were prescribed Tessalon Perles to help with cough.  Return to ER immediately if symptoms change or worsen.

## 2024-10-21 NOTE — ED PROVIDER NOTES
Limitations to History: none  External Records Reviewed  Independent Historians: self  Social determinants affecting care: none    HPI  Zeny Scott is a 69 y.o. female who presents to the emergency department for assessment of cough and shortness of breath for 1 week.  She reports that cough is dry.  She reports her shortness of breath seemed to be much worse last night.  She reports that sometimes she is wheezing.  She does report some upper chest pain that feels like a tightness or pressure.  She denies any fever or chills.  She denies any abdominal pains, nausea, vomiting, diarrhea.  She reports that she has been compliant with her anticoagulation.  She reports that she has been babysitting her grandson who recently had a viral illness.  She has no further complaints.    [facility]  Past Medical History:   Diagnosis Date    Actinic keratosis     Basal cell carcinoma     Cataract     Chronic atrial fibrillation (Multi) 03/07/2023    Diabetes mellitus (Multi)     Personal history of other diseases of the circulatory system     History of atrial fibrillation    Personal history of other diseases of the circulatory system     History of atrial fibrillation    Personal history of other endocrine, nutritional and metabolic disease     History of type 2 diabetes mellitus    Personal history of other endocrine, nutritional and metabolic disease     History of hyperthyroidism    Personal history of other endocrine, nutritional and metabolic disease     History of hyperparathyroidism    Personal history of other endocrine, nutritional and metabolic disease     History of diabetes mellitus    Personal history of other infectious and parasitic diseases     History of varicella    Personal history of other specified conditions 10/30/2019    History of shortness of breath    Shortness of breath     Shortness of breath on exertion    reviewed by myself.    Meds  Current Outpatient Medications   Medication Instructions     amLODIPine (NORVASC) 5 mg, oral, Daily    atorvastatin (Lipitor) 40 mg tablet oral    benzonatate (TESSALON) 100 mg, oral, Every 8 hours, Do not crush or chew.    clotrimazole (Lotrimin) 1 % external solution     Eliquis 5 mg, oral, 2 times daily    Farxiga 10 mg, oral, Daily    FreeStyle Main 2 [place] misc use as directed to monitor blood glucose continuously    FreeStyle Main 2 Sensor kit use as directed and change every 14 days to monitor blood glucose continuously. use reader to scan at least 3 times per day    FreeStyle Precision Maninder Strips strip use as directed to monitor blood glucose three times daily    gabapentin (NEURONTIN) 200 mg, oral, Nightly    glimepiride (AMARYL) 4 mg, oral, Daily before breakfast    ketoconazole (NIZOral) 2 % cream Topical, 2 times daily    ketoconazole (NIZOral) 2 % shampoo Topical, Every other day    LORazepam (ATIVAN) 1 mg, oral, Every 6 hours PRN    losartan (Cozaar) 50 mg tablet 1 tablet, oral, Daily (0630)    metFORMIN XR (GLUCOPHAGE-XR) 500 mg, oral, 2 times daily (morning and late afternoon)    methIMAzole (TAPAZOLE) 5 mg    metoprolol tartrate (LOPRESSOR) 25 mg, oral, 2 times daily    nitroglycerin (NITROSTAT) 0.4 mg, sublingual, Every 5 min PRN    omeprazole (PRILOSEC) 40 mg, oral, Daily    spironolactone (ALDACTONE) 25 mg, oral, Daily    triamcinolone (Kenalog) 0.025 % ointment Topical, 2 times daily    triamcinolone (Kenalog) 0.1 % cream Apply topically 2 times a day for 14 days, then reduce frequency to twice a week.  Repeat as needed, but do not use more than 2 weeks per month.       Allergies  Allergies   Allergen Reactions    Oxycodone Shortness of breath    Oxycodone-Acetaminophen Shortness of breath    Nickel Unknown    reviewed by myself.    SHx  Social History     Tobacco Use    Smoking status: Never    Smokeless tobacco: Never   Vaping Use    Vaping status: Never Used   Substance Use Topics    Alcohol use: Yes     Comment: occasional    Drug use: Never     "reviewed by myself.      ------------------------------------------------------------------------------------------------------------------------------------------    /77   Pulse 79   Temp 36 °C (96.8 °F)   Resp (!) 22   Ht 1.6 m (5' 3\")   Wt 89.8 kg (198 lb)   SpO2 96%   BMI 35.07 kg/m²     Physical Exam  Vitals and nursing note reviewed.   Constitutional:       General: She is not in acute distress.     Appearance: Normal appearance. She is well-developed and normal weight. She is not ill-appearing or toxic-appearing.   HENT:      Head: Normocephalic.      Nose: Nose normal.      Mouth/Throat:      Mouth: Mucous membranes are moist.   Eyes:      Extraocular Movements: Extraocular movements intact.      Conjunctiva/sclera: Conjunctivae normal.   Cardiovascular:      Rate and Rhythm: Normal rate. Rhythm irregularly irregular.   Pulmonary:      Effort: Pulmonary effort is normal.      Breath sounds: Normal breath sounds.   Abdominal:      General: Abdomen is flat. Bowel sounds are normal.      Palpations: Abdomen is soft.   Musculoskeletal:         General: Normal range of motion.      Cervical back: Neck supple.      Right lower leg: No edema.      Left lower leg: No edema.   Skin:     General: Skin is warm and dry.   Neurological:      General: No focal deficit present.      Mental Status: She is alert and oriented to person, place, and time.   Psychiatric:         Attention and Perception: Attention normal.         Mood and Affect: Mood normal.          ------------------------------------------------------------------------------------------------------------------------------------------  Labs  Labs Reviewed   CBC WITH AUTO DIFFERENTIAL - Abnormal       Result Value    WBC 6.4      nRBC 0.0      RBC 4.67      Hemoglobin 12.3      Hematocrit 38.6      MCV 83      MCH 26.3      MCHC 31.9 (*)     RDW 13.8      Platelets 188      Neutrophils % 69.9      Immature Granulocytes %, Automated 0.3      " Lymphocytes % 21.0      Monocytes % 7.4      Eosinophils % 1.1      Basophils % 0.3      Neutrophils Absolute 4.47      Immature Granulocytes Absolute, Automated 0.02      Lymphocytes Absolute 1.34      Monocytes Absolute 0.47      Eosinophils Absolute 0.07      Basophils Absolute 0.02     COMPREHENSIVE METABOLIC PANEL - Abnormal    Glucose 172 (*)     Sodium 137      Potassium 3.6      Chloride 103      Bicarbonate 28      Anion Gap 10      Urea Nitrogen 17      Creatinine 0.41 (*)     eGFR >90      Calcium 9.4      Albumin 4.0      Alkaline Phosphatase 140 (*)     Total Protein 7.4      AST 17      Bilirubin, Total 0.9      ALT 16     B-TYPE NATRIURETIC PEPTIDE - Abnormal     (*)     Narrative:        <100 pg/mL - Heart failure unlikely  100-299 pg/mL - Intermediate probability of acute heart                  failure exacerbation. Correlate with clinical                  context and patient history.    >=300 pg/mL - Heart Failure likely. Correlate with clinical                  context and patient history.    BNP testing is performed using different testing methodology at [facility] than at other [facility]. Direct result comparisons should only be made within the same method.      D-DIMER, VTE EXCLUSION - Abnormal    D-Dimer, Quantitative VTE Exclusion 1,117 (*)     Narrative:     The VTE Exclusion D-Dimer assay is reported in ng/mL Fibrinogen Equivalent Units (FEU).    Per 's instructions for use, a value of less than 500 ng/mL (FEU) may help to exclude DVT or PE in outpatients when the assay is used with a clinical pretest probability assessment.(AEMR must utilize and document eCalc 'Wells Score Deep Vein Thrombosis Risk' for DVT exclusion only. Emergency Department should utilize  Guidelines for Emergency Department Use of the VTE Exclusion D-Dimer and Clinical Pretest probability assessment model for DVT or PE exclusion.)   TROPONIN I, HIGH SENSITIVITY - Normal     Troponin I, High Sensitivity 7      Narrative:     Less than 99th percentile of normal range cutoff-  Female and children under 18 years old <14 ng/L; Male <21 ng/L: Negative  Repeat testing should be performed if clinically indicated.     Female and children under 18 years old 14-50 ng/L; Male 21-50 ng/L:  Consistent with possible cardiac damage and possible increased clinical   risk. Serial measurements may help to assess extent of myocardial damage.     >50 ng/L: Consistent with cardiac damage, increased clinical risk and  myocardial infarction. Serial measurements may help assess extent of   myocardial damage.      NOTE: Children less than 1 year old may have higher baseline troponin   levels and results should be interpreted in conjunction with the overall   clinical context.     NOTE: Troponin I testing is performed using a different   testing methodology at [facility] than at other   [facility]. Direct result comparisons should only   be made within the same method.   SARS-COV-2 PCR - Normal    Coronavirus 2019, PCR Not Detected      Narrative:     This assay has received FDA Emergency Use Authorization (EUA) and is only authorized for the duration of time that circumstances exist to justify the authorization of the emergency use of in vitro diagnostic tests for the detection of SARS-CoV-2 virus and/or diagnosis of COVID-19 infection under section 564(b)(1) of the Act, 21 U.S.C. 360bbb-3(b)(1). This assay is an in vitro diagnostic nucleic acid amplification test for the qualitative detection of SARS-CoV-2 from nasopharyngeal specimens and has been validated for use at [facility]. Negative results do not preclude COVID-19 infections and should not be used as the sole basis for diagnosis, treatment, or other management decisions.     INFLUENZA A AND B PCR - Normal    Flu A Result Not Detected      Flu B Result Not Detected      Narrative:     This assay is an in vitro  diagnostic multiplex nucleic acid amplification test for the detection and discrimination of Influenza A & B from nasopharyngeal specimens, and has been validated for use at [facility]. Negative results do not preclude Influenza A/B infections, and should not be used as the sole basis for diagnosis, treatment, or other management decisions. If Influenza A/B and RSV PCR results are negative, testing for Parainfluenza virus, Adenovirus and Metapneumovirus is routinely performed for [facility] pediatric oncology and intensive care inpatients, and is available on other patients by placing an add-on request.   RSV PCR - Normal    RSV PCR Not Detected      Narrative:     This assay is an FDA-cleared, in vitro diagnostic nucleic acid amplification test for the detection of RSV from nasopharyngeal specimens, and has been validated for use at [facility]. Negative results do not preclude RSV infections, and should not be used as the sole basis for diagnosis, treatment, or other management decisions. If Influenza A/B and RSV PCR results are negative, testing for Parainfluenza virus, Adenovirus and Metapneumovirus is routinely performed for pediatric oncology and intensive care inpatients at [facility], and is available on other patients by placing an add-on request.            Imaging  CT angio chest for pulmonary embolism   Final Result   VERY MILD INTERSTITIAL EDEMA IN THE LUNG APICES AND LUNG BASES        NO OTHER ACUTE DISEASE IN THE CHEST        NO ALVEOLAR EDEMA        HEART SIZE AT LEAST BORDERLINE, WITH PROMINENCE OF BOTH ATRIA        NO PLEURAL OR PERICARDIAL EFFUSION        NO GROUND-GLASS AIRSPACE DISEASE OR CONSOLIDATION        NO PNEUMOTHORAX        NO ACUTE PULMONARY EMBOLISM THROUGH THE SEGMENTAL BRANCH LEVEL        NO AORTIC DISSECTION OR OTHER ACUTE THORACIC AORTIC FINDINGS        MACRO:   None        Signed by: Eduardo Graves 10/21/2024 11:13 AM   Dictation workstation:    [id number]      XR chest 2 views   Final Result   1.  No evidence of acute cardiopulmonary process.                  MACRO:   None        Signed by: Cristianokendalyefri Adriannaveen 10/21/2024 8:35 AM   Dictation workstation:   [id number]           ED Course  ED Course as of 10/21/24 1146   Mon Oct 21, 2024   0757 EKG interpreted by myself independently, EKG shows a rate controlled A-fib, rate 89 bpm, WV interval 90, , QTc 446, patient has normal axis, no ST elevations or depressions, negative for acute MI [ILIA]      ED Course User Index  [ILIA] Harshil Garzon, DO         Diagnoses as of 10/21/24 1146   Shortness of breath   Viral illness        Medical Decision Making: She did not appear ill or toxic.  Vital signs reviewed and stable.  Her lungs are clear to auscultation.  No signs of respiratory distress.  She does have a dry cough.  Will obtain labs and chest x-ray.    Differential diagnoses considered: ACS, pneumonia, RSV, COVID, influenza, viral illness, GERD, postnasal drainage, others    Medications given: DuoNeb treatment    EKG interpreted by myself and ED attending: See above     I reviewed the labs from today.  No leukocytosis leukopenia.  H&H is stable.  BUN and creatinine normal.  Glucose 172.  Troponin negative.  COVID influenza negative.  RSV negative.  .  Chest x-ray showing no acute cardiopulmonary process.  Case discussed and evaluated with ED attending, Dr. Garzon.  He recommends D-dimer and breathing treatment to see if she feels any improvement.  D-dimer came back elevated so CTA of the chest was ordered.  CTA of the chest showing very mild interstitial edema but otherwise unremarkable.  No evidence of pulmonary embolism.  No pneumonia.  She was reassessed and resting comfortably.  She is 96% on room air.  Discussed with her about the workup today.  I believe she likely is a viral illness from her grandchild.  She will be prescribed home health to help with cough.  I recommend she be reassessed by her PCP in  2 to 3 days.  She is to return to ER immediately if symptoms change or worsen.  She verbalized understanding and agreed to plan of care.  She was discharged home in stable condition.    Diagnosis: viral illness  Plan: discharge     Rubén Grier PA-C  10/21/24 1146

## 2024-10-21 NOTE — ED TRIAGE NOTES
Patient states SOB x1 week, worse since last night. Unable to catch her breath, dry cough. Was recently taking care of her grandson who was sick.

## 2024-10-22 PROBLEM — E66.812 CLASS 2 OBESITY WITH BODY MASS INDEX (BMI) OF 35.0 TO 35.9 IN ADULT: Status: ACTIVE | Noted: 2024-07-08

## 2024-10-22 PROBLEM — Z79.899 HIGH RISK MEDICATION USE: Status: ACTIVE | Noted: 2024-10-22

## 2024-10-22 LAB
ATRIAL RATE: 124 BPM
PR INTERVAL: 166 MS
Q ONSET: 251 MS
QRS COUNT: 14 BEATS
QRS DURATION: 90 MS
QT INTERVAL: 366 MS
QTC CALCULATION(BAZETT): 446 MS
QTC FREDERICIA: 417 MS
R AXIS: 30 DEGREES
T AXIS: 63 DEGREES
T OFFSET: 434 MS
VENTRICULAR RATE: 89 BPM

## 2024-10-22 NOTE — PROGRESS NOTES
History Of Present Illness    This is a 69-year-old female with persistent atrial fibrillation.  The patient presents for a follow-up evaluation after completing a Holter monitor.  No chest pain, palpitations, or shortness of breath.      Zeny was initially seen in consultation on 2024 at the request of Dr. Tee.  The patient has been feeling more short of breath on exertion.  She has a long history of atrial fibrillation and has had cardioversions in the past.  She reports having 3 cardioversions, but only maintained sinus rhythm for 1 or 2 days after the procedure.  She is on metoprolol, but feels lightheaded at times.  Available cardiology records reviewed.    Past medical history:    Coronary artery disease  Longstanding persistent atrial fibrillation  Diabetes mellitus  Hepatic cirrhosis  Cholecystectomy  Hypertension    Past surgical history: Cholecystectomy   x 2    Social history: Non-smoker    Family history: Positive for coronary artery disease     Review of systems  Other review of systems negative other than as outlined in HPI     Social History  She reports that she has never smoked. She has never used smokeless tobacco. She reports current alcohol use. She reports that she does not use drugs.    Family History  Family History   Problem Relation Name Age of Onset    Alzheimer's disease Mother      Other (cardiac disorder) Father      Kidney disease Father      Hypertension Father      Liver cancer Brother       Allergies  Oxycodone, Oxycodone-acetaminophen, and Nickel    Medications  Current Outpatient Medications   Medication Instructions    amLODIPine (NORVASC) 5 mg, oral, Daily    atorvastatin (Lipitor) 40 mg tablet Take by mouth.    benzonatate (TESSALON) 100 mg, oral, Every 8 hours, Do not crush or chew.    clotrimazole (Lotrimin) 1 % external solution     Eliquis 5 mg, oral, 2 times daily    Farxiga 10 mg, oral, Daily    FreeStyle Main 2 [place] misc use as directed to  "monitor blood glucose continuously    FreeStyle Main 2 Sensor kit use as directed and change every 14 days to monitor blood glucose continuously. use reader to scan at least 3 times per day    FreeStyle Precision Maninder Strips strip use as directed to monitor blood glucose three times daily    gabapentin (NEURONTIN) 200 mg, oral, Nightly    glimepiride (AMARYL) 4 mg, Daily before breakfast    ketoconazole (NIZOral) 2 % cream Topical, 2 times daily    ketoconazole (NIZOral) 2 % shampoo Topical, Every other day    LORazepam (ATIVAN) 1 mg, Every 6 hours PRN    losartan (Cozaar) 50 mg tablet 1 tablet, Daily (0630)    metFORMIN XR (GLUCOPHAGE-XR) 500 mg, 2 times daily (morning and late afternoon)    methIMAzole (TAPAZOLE) 5 mg    metoprolol tartrate (LOPRESSOR) 25 mg, 2 times daily    nitroglycerin (NITROSTAT) 0.4 mg, Every 5 min PRN    omeprazole (PRILOSEC) 40 mg, oral, Daily    spironolactone (ALDACTONE) 25 mg, oral, Daily    triamcinolone (Kenalog) 0.025 % ointment Topical, 2 times daily    triamcinolone (Kenalog) 0.1 % cream Apply topically 2 times a day for 14 days, then reduce frequency to twice a week.  Repeat as needed, but do not use more than 2 weeks per month.     Vitals      9/30/2024     1:26 PM 10/14/2024     3:35 PM 10/21/2024     8:00 AM 10/21/2024     8:31 AM 10/21/2024    10:15 AM 10/21/2024    11:30 AM 10/23/2024     1:09 PM   Vitals   Systolic 132  171 164 178 168 140   Diastolic 62  105 77 77 70 74   Heart Rate 89  104 78 79 84 78   Temp   36 °C (96.8 °F)       Resp   24 24 22 23    Height (in) 1.6 m (5' 3\") 1.6 m (5' 3\") 1.6 m (5' 3\")    1.6 m (5' 3\")   Weight (lb) 195 198 198    194   BMI 34.54 kg/m2 35.07 kg/m2 35.07 kg/m2    34.37 kg/m2   BSA (m2) 1.98 m2 2 m2 2 m2    1.98 m2   Visit Report Report      Report     Physical Exam    General Appearance:  Alert, oriented, no distress  Skin:  Warm and dry  Head and Neck:  No elevation of JVP, no carotid bruits  Cardiac Exam:  Rhythm is irregular, S1 and " S2 are normal, no murmur S3 or S4  Lungs:  Clear to auscultation  Extremities:  no edema  Neurologic:  No focal deficits  Psychiatric:  Appropriate mood and behavior    Test Results    Nuclear stress test March 2024: No ischemia, normal left ventricular function  Echocardiogram 2023: Ejection fraction 55 to 60%.  EKG review: Atrial fibrillation has been present on all EKGs since January 2019.  Holter monitor September 27, 2024: Atrial fibrillation with minimum heart rate 32 bpm, maximum heart rate 126 bpm, and average heart rate 67 bpm  Assessment/Plan  Problem List Items Addressed This Visit             ICD-10-CM    Chronic atrial fibrillation (Multi) - Primary (Chronic) I48.20     1.  Permanent atrial fibrillation:  Zeny has been in atrial fibrillation since at least January 2016.  The Holter monitor shows atrial fibrillation with a controlled ventricular response and her average heart rate was 67 bpm, and maximum heart rate 126 bpm.  The atrial fibrillation is permanent and she is not an appropriate candidate for rhythm control strategies.  At this time there is no pacemaker indication.  She has had no bleeding issues on Eliquis and will continue this for anticoagulation.  Follow-up in 10 to 12 months.         High risk medication use Z79.899     James C Ramicone, DO

## 2024-10-23 ENCOUNTER — OFFICE VISIT (OUTPATIENT)
Dept: CARDIOLOGY | Facility: CLINIC | Age: 69
End: 2024-10-23
Payer: COMMERCIAL

## 2024-10-23 VITALS
OXYGEN SATURATION: 93 % | BODY MASS INDEX: 34.38 KG/M2 | HEART RATE: 78 BPM | HEIGHT: 63 IN | DIASTOLIC BLOOD PRESSURE: 74 MMHG | SYSTOLIC BLOOD PRESSURE: 140 MMHG | WEIGHT: 194 LBS

## 2024-10-23 DIAGNOSIS — Z79.899 HIGH RISK MEDICATION USE: ICD-10-CM

## 2024-10-23 DIAGNOSIS — I48.20 CHRONIC ATRIAL FIBRILLATION (MULTI): Primary | Chronic | ICD-10-CM

## 2024-10-23 PROCEDURE — 99214 OFFICE O/P EST MOD 30 MIN: CPT | Performed by: INTERNAL MEDICINE

## 2024-10-23 PROCEDURE — 3077F SYST BP >= 140 MM HG: CPT | Performed by: INTERNAL MEDICINE

## 2024-10-23 PROCEDURE — 3008F BODY MASS INDEX DOCD: CPT | Performed by: INTERNAL MEDICINE

## 2024-10-23 PROCEDURE — 1159F MED LIST DOCD IN RCRD: CPT | Performed by: INTERNAL MEDICINE

## 2024-10-23 PROCEDURE — 4010F ACE/ARB THERAPY RXD/TAKEN: CPT | Performed by: INTERNAL MEDICINE

## 2024-10-23 PROCEDURE — 3078F DIAST BP <80 MM HG: CPT | Performed by: INTERNAL MEDICINE

## 2024-10-23 PROCEDURE — 3051F HG A1C>EQUAL 7.0%<8.0%: CPT | Performed by: INTERNAL MEDICINE

## 2024-10-23 PROCEDURE — 1036F TOBACCO NON-USER: CPT | Performed by: INTERNAL MEDICINE

## 2024-10-23 PROCEDURE — 3048F LDL-C <100 MG/DL: CPT | Performed by: INTERNAL MEDICINE

## 2024-10-23 NOTE — ASSESSMENT & PLAN NOTE
1.  Permanent atrial fibrillation:  Zeny has been in atrial fibrillation since at least January 2016.  The Holter monitor shows atrial fibrillation with a controlled ventricular response and her average heart rate was 67 bpm, and maximum heart rate 126 bpm.  The atrial fibrillation is permanent and she is not an appropriate candidate for rhythm control strategies.  At this time there is no pacemaker indication.  She has had no bleeding issues on Eliquis and will continue this for anticoagulation.  Follow-up in 10 to 12 months.

## 2024-10-23 NOTE — LETTER
2024     Javier Fisher DO  [address] [facility] [facility] [address]  [address]  [facility] [address]    Patient: Zeny Scott   YOB: 1955   Date of Visit: 10/23/2024       Dear Dr. Javier Fisher DO:    Thank you for referring Zeny Scott to me for evaluation. Below are my notes for this consultation.  If you have questions, please do not hesitate to call me. I look forward to following your patient along with you.       Sincerely,     James C Ramicone, DO      CC: No Recipients  ______________________________________________________________________________________    History Of Present Illness    This is a 69-year-old female with persistent atrial fibrillation.  The patient presents for a follow-up evaluation after completing a Holter monitor.  No chest pain, palpitations, or shortness of breath.      Zeny was initially seen in consultation on 2024 at the request of Dr. Tee.  The patient has been feeling more short of breath on exertion.  She has a long history of atrial fibrillation and has had cardioversions in the past.  She reports having 3 cardioversions, but only maintained sinus rhythm for 1 or 2 days after the procedure.  She is on metoprolol, but feels lightheaded at times.  Available cardiology records reviewed.    Past medical history:    Coronary artery disease  Longstanding persistent atrial fibrillation  Diabetes mellitus  Hepatic cirrhosis  Cholecystectomy  Hypertension    Past surgical history: Cholecystectomy   x 2    Social history: Non-smoker    Family history: Positive for coronary artery disease     Review of systems  Other review of systems negative other than as outlined in HPI     Social History  She reports that she has never smoked. She has never used smokeless tobacco. She reports current alcohol use. She reports that she does not use drugs.    Family History  Family History   Problem Relation Name Age of Onset   • Alzheimer's disease Mother     • Other  (cardiac disorder) Father     • Kidney disease Father     • Hypertension Father     • Liver cancer Brother       Allergies  Oxycodone, Oxycodone-acetaminophen, and Nickel    Medications  Current Outpatient Medications   Medication Instructions   • amLODIPine (NORVASC) 5 mg, oral, Daily   • atorvastatin (Lipitor) 40 mg tablet Take by mouth.   • benzonatate (TESSALON) 100 mg, oral, Every 8 hours, Do not crush or chew.   • clotrimazole (Lotrimin) 1 % external solution    • Eliquis 5 mg, oral, 2 times daily   • Farxiga 10 mg, oral, Daily   • FreeStyle Main 2 [place] misc use as directed to monitor blood glucose continuously   • FreeStyle Main 2 Sensor kit use as directed and change every 14 days to monitor blood glucose continuously. use reader to scan at least 3 times per day   • FreeStyle Precision Maninder Strips strip use as directed to monitor blood glucose three times daily   • gabapentin (NEURONTIN) 200 mg, oral, Nightly   • glimepiride (AMARYL) 4 mg, Daily before breakfast   • ketoconazole (NIZOral) 2 % cream Topical, 2 times daily   • ketoconazole (NIZOral) 2 % shampoo Topical, Every other day   • LORazepam (ATIVAN) 1 mg, Every 6 hours PRN   • losartan (Cozaar) 50 mg tablet 1 tablet, Daily (0630)   • metFORMIN XR (GLUCOPHAGE-XR) 500 mg, 2 times daily (morning and late afternoon)   • methIMAzole (TAPAZOLE) 5 mg   • metoprolol tartrate (LOPRESSOR) 25 mg, 2 times daily   • nitroglycerin (NITROSTAT) 0.4 mg, Every 5 min PRN   • omeprazole (PRILOSEC) 40 mg, oral, Daily   • spironolactone (ALDACTONE) 25 mg, oral, Daily   • triamcinolone (Kenalog) 0.025 % ointment Topical, 2 times daily   • triamcinolone (Kenalog) 0.1 % cream Apply topically 2 times a day for 14 days, then reduce frequency to twice a week.  Repeat as needed, but do not use more than 2 weeks per month.     Vitals      9/30/2024     1:26 PM 10/14/2024     3:35 PM 10/21/2024     8:00 AM 10/21/2024     8:31 AM 10/21/2024    10:15 AM 10/21/2024    11:30 AM  "10/23/2024     1:09 PM   Vitals   Systolic 132  171 164 178 168 140   Diastolic 62  105 77 77 70 74   Heart Rate 89  104 78 79 84 78   Temp   36 °C (96.8 °F)       Resp   24 24 22 23    Height (in) 1.6 m (5' 3\") 1.6 m (5' 3\") 1.6 m (5' 3\")    1.6 m (5' 3\")   Weight (lb) 195 198 198    194   BMI 34.54 kg/m2 35.07 kg/m2 35.07 kg/m2    34.37 kg/m2   BSA (m2) 1.98 m2 2 m2 2 m2    1.98 m2   Visit Report Report      Report     Physical Exam    General Appearance:  Alert, oriented, no distress  Skin:  Warm and dry  Head and Neck:  No elevation of JVP, no carotid bruits  Cardiac Exam:  Rhythm is irregular, S1 and S2 are normal, no murmur S3 or S4  Lungs:  Clear to auscultation  Extremities:  no edema  Neurologic:  No focal deficits  Psychiatric:  Appropriate mood and behavior    Test Results    Nuclear stress test March 2024: No ischemia, normal left ventricular function  Echocardiogram 2023: Ejection fraction 55 to 60%.  EKG review: Atrial fibrillation has been present on all EKGs since January 2019.  Holter monitor September 27, 2024: Atrial fibrillation with minimum heart rate 32 bpm, maximum heart rate 126 bpm, and average heart rate 67 bpm  Assessment/Plan  Problem List Items Addressed This Visit             ICD-10-CM    Chronic atrial fibrillation (Multi) - Primary (Chronic) I48.20     1.  Permanent atrial fibrillation:  Zeny has been in atrial fibrillation since at least January 2016.  The Holter monitor shows atrial fibrillation with a controlled ventricular response and her average heart rate was 67 bpm, and maximum heart rate 126 bpm.  The atrial fibrillation is permanent and she is not an appropriate candidate for rhythm control strategies.  At this time there is no pacemaker indication.  She has had no bleeding issues on Eliquis and will continue this for anticoagulation.  Follow-up in 10 to 12 months.         High risk medication use Z79.899     James C Ramicone, DO    "

## 2024-10-25 ENCOUNTER — APPOINTMENT (OUTPATIENT)
Dept: OPHTHALMOLOGY | Facility: CLINIC | Age: 69
End: 2024-10-25
Payer: COMMERCIAL

## 2024-10-25 DIAGNOSIS — H52.03 HYPERMETROPIA OF BOTH EYES: Primary | ICD-10-CM

## 2024-10-25 DIAGNOSIS — H52.223 REGULAR ASTIGMATISM OF BOTH EYES: ICD-10-CM

## 2024-10-25 DIAGNOSIS — H35.89 RPE MOTTLING OF MACULA: ICD-10-CM

## 2024-10-25 DIAGNOSIS — H52.4 PRESBYOPIA: ICD-10-CM

## 2024-10-25 DIAGNOSIS — H47.392 OPTIC DISC HEMORRHAGE, LEFT: ICD-10-CM

## 2024-10-25 DIAGNOSIS — H25.813 COMBINED FORMS OF AGE-RELATED CATARACT OF BOTH EYES: ICD-10-CM

## 2024-10-25 PROCEDURE — 92134 CPTRZ OPH DX IMG PST SGM RTA: CPT | Performed by: OPTOMETRIST

## 2024-10-25 PROCEDURE — 92014 COMPRE OPH EXAM EST PT 1/>: CPT | Performed by: OPTOMETRIST

## 2024-10-25 PROCEDURE — 92015 DETERMINE REFRACTIVE STATE: CPT | Performed by: OPTOMETRIST

## 2024-10-25 ASSESSMENT — REFRACTION_MANIFEST
OD_AXIS: 080
OS_CYLINDER: -1.50
OD_SPHERE: +2.50
OS_SPHERE: +1.75
OD_CYLINDER: -1.75
OS_AXIS: 090
OD_ADD: +2.50
OS_ADD: +2.50

## 2024-10-25 ASSESSMENT — VISUAL ACUITY
METHOD: SNELLEN - LINEAR
OS_SC: 20/40-
OD_BAT_MED: 20/30
OD_SC: 20/50+2
OS_BAT_MED: 20/40

## 2024-10-25 ASSESSMENT — CONF VISUAL FIELD
OS_SUPERIOR_TEMPORAL_RESTRICTION: 0
OD_SUPERIOR_TEMPORAL_RESTRICTION: 0
OS_INFERIOR_NASAL_RESTRICTION: 0
OD_NORMAL: 1
OD_SUPERIOR_NASAL_RESTRICTION: 0
OS_NORMAL: 1
OS_INFERIOR_TEMPORAL_RESTRICTION: 0
OD_INFERIOR_NASAL_RESTRICTION: 0
OS_SUPERIOR_NASAL_RESTRICTION: 0
OD_INFERIOR_TEMPORAL_RESTRICTION: 0
METHOD: COUNTING FINGERS

## 2024-10-25 ASSESSMENT — ENCOUNTER SYMPTOMS
NEUROLOGICAL NEGATIVE: 0
CONSTITUTIONAL NEGATIVE: 0
GASTROINTESTINAL NEGATIVE: 0
RESPIRATORY NEGATIVE: 0
HEMATOLOGIC/LYMPHATIC NEGATIVE: 0
ALLERGIC/IMMUNOLOGIC NEGATIVE: 0
EYES NEGATIVE: 1
PSYCHIATRIC NEGATIVE: 0
CARDIOVASCULAR NEGATIVE: 0
MUSCULOSKELETAL NEGATIVE: 0
ENDOCRINE NEGATIVE: 0

## 2024-10-25 ASSESSMENT — SLIT LAMP EXAM - LIDS
COMMENTS: GOOD POSITION, DERMATOCHALASIS
COMMENTS: GOOD POSITION, DERMATOCHALASIS

## 2024-10-25 ASSESSMENT — REFRACTION
OD_ADD: +2.50
OS_SPHERE: +2.50
OS_ADD: +2.50
OS_CYLINDER: -1.50
OD_AXIS: 080
OS_AXIS: 090
OD_CYLINDER: -1.75
OD_SPHERE: +3.00

## 2024-10-25 ASSESSMENT — CUP TO DISC RATIO
OS_RATIO: 0.25
OD_RATIO: 0.25

## 2024-10-25 ASSESSMENT — TONOMETRY
OS_IOP_MMHG: 13
IOP_METHOD: GOLDMANN APPLANATION
OD_IOP_MMHG: 12

## 2024-10-25 ASSESSMENT — EXTERNAL EXAM - RIGHT EYE: OD_EXAM: NORMAL

## 2024-10-25 ASSESSMENT — EXTERNAL EXAM - LEFT EYE: OS_EXAM: NORMAL

## 2024-10-25 NOTE — PROGRESS NOTES
Assessment/Plan   Diagnoses and all orders for this visit:  Hypermetropia of both eyes  Regular astigmatism of both eyes  Presbyopia  New spec rx released today per patient request. Ocular health wnl for age OU. Monitor 1 year or sooner prn. Refraction billed today. Pt consents to receiving glasses Rx today. Patient's/guardian's signature obtained to acknowledge and confirm that a paper copy of glasses Rx was given to patient in compliance with [facility] Eyeglass Rule. Electronic copy of Rx will also be available via [employer].  Discussed hyperopia and aging. Recommend glasses for distance and near. Pt req separate pairs of glasses.     Combined forms of age-related cataract of both eyes  Patient's cataracts are not visually significant. Will monitor for changes. No indication for surgery at this time.    RPE mottling of macula  -     OCT, Retina - OU - Both Eyes  Mild RPE change. Not VS. BCVA post dilation 20/20-2    Optic disc hemorrhage, left  Discussed possible causes. Pt with recent history of severe cough d/t viral infxn. +hx of eliquis use. Recommend RTC 3-4 months for OCT RNFL, IOP by applanation, SLE.   If not resolved will do HVF 24-2.

## 2024-10-29 DIAGNOSIS — K21.9 GASTROESOPHAGEAL REFLUX DISEASE, UNSPECIFIED WHETHER ESOPHAGITIS PRESENT: ICD-10-CM

## 2024-10-30 RX ORDER — OMEPRAZOLE 40 MG/1
40 CAPSULE, DELAYED RELEASE ORAL DAILY
Qty: 30 CAPSULE | Refills: 0 | Status: SHIPPED | OUTPATIENT
Start: 2024-10-30 | End: 2024-11-01 | Stop reason: SDUPTHER

## 2024-11-01 DIAGNOSIS — K21.9 GASTROESOPHAGEAL REFLUX DISEASE, UNSPECIFIED WHETHER ESOPHAGITIS PRESENT: ICD-10-CM

## 2024-11-01 RX ORDER — OMEPRAZOLE 40 MG/1
40 CAPSULE, DELAYED RELEASE ORAL DAILY
Qty: 30 CAPSULE | Refills: 1 | Status: SHIPPED | OUTPATIENT
Start: 2024-11-01

## 2024-11-06 ENCOUNTER — APPOINTMENT (OUTPATIENT)
Dept: PHARMACY | Facility: HOSPITAL | Age: 69
End: 2024-11-06
Payer: COMMERCIAL

## 2024-11-06 DIAGNOSIS — I48.20 CHRONIC ATRIAL FIBRILLATION (MULTI): Primary | ICD-10-CM

## 2024-11-06 DIAGNOSIS — I50.32 CHRONIC DIASTOLIC (CONGESTIVE) HEART FAILURE: ICD-10-CM

## 2024-11-06 NOTE — PROGRESS NOTES
"  Pharmacist Clinic: Cardiology Management    Zeny Scott is a 69 y.o. female was referred to Clinical Pharmacy Team for anticoagulation and heart failure management.     Referring Provider: Kirk Tee MD    THIS IS A FOLLOW UP PATIENT APPOINTMENT. AT LAST VISIT ON 09/09/2024 WITH PHARMACIST (Alyssa Nelson).    Appointment was completed by Zeny who was reached at primary number.    Note-Patient is currently on Farxiga reached out to Dr. Tee to add heart failure to referral- confirmed with provider ok to add diagnosis to referral \"Chronic diastolic CHF NYHA level 1\"    REVIEW OF PAST APPNT (IF APPLICABLE):   Patient was screened for [facility] PAP, a 30 day free trial of Eliquis was also sent to [facility]    Allergies Reviewed? Yes    Allergies   Allergen Reactions    Oxycodone Shortness of breath    Oxycodone-Acetaminophen Shortness of breath    Nickel Unknown       Past Medical History:   Diagnosis Date    Actinic keratosis     Basal cell carcinoma     Cataract     Chronic atrial fibrillation (Multi) 03/07/2023    Diabetes mellitus (Multi)     Personal history of other diseases of the circulatory system     History of atrial fibrillation    Personal history of other diseases of the circulatory system     History of atrial fibrillation    Personal history of other endocrine, nutritional and metabolic disease     History of type 2 diabetes mellitus    Personal history of other endocrine, nutritional and metabolic disease     History of hyperthyroidism    Personal history of other endocrine, nutritional and metabolic disease     History of hyperparathyroidism    Personal history of other endocrine, nutritional and metabolic disease     History of diabetes mellitus    Personal history of other infectious and parasitic diseases     History of varicella    Personal history of other specified conditions 10/30/2019    History of shortness of breath    Shortness of breath     Shortness of breath on exertion "       Current Outpatient Medications on File Prior to Visit   Medication Sig Dispense Refill    atorvastatin (Lipitor) 40 mg tablet Take by mouth.      Eliquis 5 mg tablet Take 1 tablet (5 mg) by mouth 2 times a day. 60 tablet 0    Farxiga 10 mg TAKE ONE TABLET BY MOUTH EVERY DAY 90 tablet 3    FreeStyle Main 2 [place] misc use as directed to monitor blood glucose continuously      FreeStyle Main 2 Sensor kit use as directed and change every 14 days to monitor blood glucose continuously. use reader to scan at least 3 times per day      FreeStyle Precision [place] Strips strip use as directed to monitor blood glucose three times daily      gabapentin (Neurontin) 100 mg capsule Take 2 capsules (200 mg) by mouth once daily at bedtime. 60 capsule 3    glimepiride (Amaryl) 4 mg tablet Take 1 tablet (4 mg) by mouth once daily in the morning. Take before meals.      ketoconazole (NIZOral) 2 % cream Apply topically 2 times a day. 60 g 2    ketoconazole (NIZOral) 2 % shampoo Apply topically every other day. 120 mL 2    LORazepam (Ativan) 1 mg tablet Take 1 tablet (1 mg) by mouth every 6 hours if needed.      losartan (Cozaar) 50 mg tablet Take 1 tablet (50 mg) by mouth early in the morning..      methIMAzole (Tapazole) 5 mg tablet Take 1 tablet (5 mg) by mouth once daily.      metoprolol tartrate (Lopressor) 25 mg tablet Take 1 tablet (25 mg) by mouth 2 times a day.      nitroglycerin (Nitrostat) 0.4 mg SL tablet Place 1 tablet (0.4 mg) under the tongue every 5 minutes if needed for chest pain.      omeprazole (PriLOSEC) 40 mg DR capsule Take 1 capsule (40 mg) by mouth once daily. 30 capsule 1    spironolactone (Aldactone) 25 mg tablet Take 1 tablet (25 mg) by mouth once daily. 30 tablet 11    triamcinolone (Kenalog) 0.025 % ointment Apply topically 2 times a day. 80 g 1    triamcinolone (Kenalog) 0.1 % cream Apply topically 2 times a day for 14 days, then reduce frequency to twice a week.  Repeat as needed, but do not use more  "than 2 weeks per month. 453.6 g 0    amLODIPine (Norvasc) 5 mg tablet Take 1 tablet (5 mg) by mouth once daily. 90 tablet 3    [DISCONTINUED] clotrimazole (Lotrimin) 1 % external solution       [DISCONTINUED] metFORMIN  mg 24 hr tablet Take 1 tablet (500 mg) by mouth 2 times daily (morning and late afternoon).      [DISCONTINUED] omeprazole (PriLOSEC) 40 mg DR capsule TAKE ONE CAPSULE BY MOUTH EVERY DAY 30 capsule 0     No current facility-administered medications on file prior to visit.         RELEVANT LAB RESULTS  Lab Results   Component Value Date    BILITOT 0.9 10/21/2024    CALCIUM 9.4 10/21/2024    CO2 28 10/21/2024     10/21/2024    CREATININE 0.41 (L) 10/21/2024    GLUCOSE 172 (H) 10/21/2024    ALKPHOS 140 (H) 10/21/2024    K 3.6 10/21/2024    PROT 7.4 10/21/2024     10/21/2024    AST 17 10/21/2024    ALT 16 10/21/2024    BUN 17 10/21/2024    ANIONGAP 10 10/21/2024    MG 1.47 (L) 03/07/2023    ALBUMIN 4.0 10/21/2024    AMYLASE 34 02/12/2024    LIPASE 225 (H) 02/12/2024    GFRF 79 08/22/2023     Lab Results   Component Value Date    TRIG 127 02/12/2024    CHOL 129 02/12/2024    LDLCALC 68 02/12/2024    HDL 36.1 02/12/2024     No results found for: \"BMCBC\", \"CBCDIF\"     PHARMACEUTICAL ASSESSMENT    MEDICATION RECONCILIATION    Home Pharmacy Reviewed? Yes, describe: [facility]; [place], [place]    Drug Interactions? No    Medication Documentation Review Audit       Reviewed by Rosa Gaona OD (Optometrist) on 10/25/24 at 1055      Medication Order Taking? Sig Documenting Provider Last Dose Status   amLODIPine (Norvasc) 5 mg tablet [id number] Yes Take 1 tablet (5 mg) by mouth once daily. Kirk Tee MD Taking Active   atorvastatin (Lipitor) 40 mg tablet [id number] Yes Take by mouth. Historical Provider, MD Taking Active   benzonatate (Tessalon) 100 mg capsule [id number] Yes Take 1 capsule (100 mg) by mouth every 8 hours for 7 days. Do not crush or chew. Rubén Grier PA-C  Active "   clotrimazole (Lotrimin) 1 % external solution [id number] Yes  Historical Provider, MD Taking Differently Active   Eliquis 5 mg tablet [id number] Yes Take 1 tablet (5 mg) by mouth 2 times a day. Kirk Tee MD Taking Active   Farxiga 10 mg [id number] Yes TAKE ONE TABLET BY MOUTH EVERY DAY Kirk Tee MD Taking Active   FreeStyle Main 2 [place] misc [id number] Yes use as directed to monitor blood glucose continuously Connor Jauregui MD Taking Active   FreeStyle Main 2 Sensor kit [id number] Yes use as directed and change every 14 days to monitor blood glucose continuously. use reader to scan at least 3 times per day Connor Jauregui MD Taking Active   FreeStyle Precision [place] Strips strip [id number] Yes use as directed to monitor blood glucose three times daily Historical Provider, MD Taking Active   gabapentin (Neurontin) 100 mg capsule [id number] Yes Take 2 capsules (200 mg) by mouth once daily at bedtime. Yara Najera, DO Taking Active   glimepiride (Amaryl) 4 mg tablet [id number] Yes Take 1 tablet (4 mg) by mouth once daily in the morning. Take before meals. Historical Provider, MD Taking Active   ketoconazole (NIZOral) 2 % cream [id number] Yes Apply topically 2 times a day. Liliana Martínez, DO Taking Active   ketoconazole (NIZOral) 2 % shampoo [id number] Yes Apply topically every other day. Liliana Martínez, DO Taking Active   LORazepam (Ativan) 1 mg tablet [id number] Yes Take 1 tablet (1 mg) by mouth every 6 hours if needed. Connor Provider, MD Taking Differently Active   losartan (Cozaar) 50 mg tablet [id number] Yes Take 1 tablet (50 mg) by mouth early in the morning.. Connor Jauregui MD Not Taking Active   metFORMIN  mg 24 hr tablet [id number] Yes Take 1 tablet (500 mg) by mouth 2 times daily (morning and late afternoon). Connor Provider, MD Not Taking Active   methIMAzole (Tapazole) 5 mg tablet [id number] Yes 1 tablet (5 mg). Historical Provider, MD Taking Active   metoprolol tartrate  (Lopressor) 25 mg tablet [id number] Yes Take 1 tablet (25 mg) by mouth 2 times a day. Historical Provider, MD Taking Active   nitroglycerin (Nitrostat) 0.4 mg SL tablet [id number] Yes Place 1 tablet (0.4 mg) under the tongue every 5 minutes if needed for chest pain. Historical Provider, MD Taking Differently Active   omeprazole (PriLOSEC) 40 mg DR capsule [id number] Yes TAKE ONE CAPSULE BY MOUTH EVERY DAY Josep Melton MD Taking Active   spironolactone (Aldactone) 25 mg tablet [id number] Yes Take 1 tablet (25 mg) by mouth once daily. Kirk Tee MD  Active   triamcinolone (Kenalog) 0.025 % ointment [id number] Yes Apply topically 2 times a day. Missy Orozco DPM Taking Differently Active   triamcinolone (Kenalog) 0.1 % cream [id number] Yes Apply topically 2 times a day for 14 days, then reduce frequency to twice a week.  Repeat as needed, but do not use more than 2 weeks per month. Liliana Martínez,  Taking Differently Active                    DISEASE MANAGEMENT ASSESSMENT:     ANTICOAGULATION ASSESSMENT    The ASCVD Risk score (Marcie DK, et al., 2019) failed to calculate for the following reasons:    The valid total cholesterol range is 130 to 320 mg/dL    DIAGNOSIS: prevention of nonvalvular atrial fibrilliation stroke and systemic embolism  - Patient is projected to be on anticoagulation long term  - [id number] Score: [5] (only included if diagnosis is atrial fibrillation)   Age: [<65 (0)] [65-74 (+1)] [> 75 (+2)]: 1  Sex: [Male/Female (+1)]: 1  CHF history: [No/Yes(+1)]: 1  Hypertension history: [No/Yes(+1)]: 1  Stroke/TIA/thromboembolism history: [No/Yes(+2)]: 0  Vascular disease history (prior MI, peripheral artery disease, aortic plaque): [No/Yes(+1)]: 0  Diabetes history: [No/Yes(+1)]: 1    CURRENT PHARMACOTHERAPY:    Eliquis 5mg twice daily  70yo  88.5kg  Scr 0.41 (10/21/2024)    RELEVANT PAST MEDICAL HISTORY:   Afib, CHF, HTN, CHF    Affordability/Accessibility:  PAP- income documents  needed  Adherence/Organization: reports adherence  Adverse Reactions: none reported  Recent Hospitalizations: none  Recent Falls/Trauma: none reported  Changes in Tobacco or Alcohol Intake:   Tobacco: does not use  Alcohol: does not use    EDUCATION/COUNSELING:   - Counseled patient on MOA, expectations, duration of therapy, contraindications, administration, and monitoring parameters  - Counseled patient of side effects that are indicative of bleeding such as dark tarry stool, unexplainable bruising, or vomiting up a coffee ground like substance     CHF ASSESSMENT     Symptom/Staging:  -Most recent ejection fraction: 60%  -NYHA Stage: I    Results for orders placed during the hospital encounter of 10/14/24    Transthoracic Echo (TTE) Complete    Narrative  [facility], [address] [place] [address]  Tel [phone] and Fax [phone]    TRANSTHORACIC ECHOCARDIOGRAM REPORT      Patient Name:      GHULAM SHAVON Arreaga Physician:    [id number] Kirk Metcalf MD, [facility]  Study Date:        10/14/2024           Ordering Provider:    [id number] KIRK METCALF  MRN/PID:           [id number]             Fellow:  Accession#:        [id number]         Nurse:  Date of Birth/Age: 1955 / 69 years Sonographer:          Jody Guerrero  RDCS  Gender:            F                    Additional Staff:  Height:            160.02 cm            Admit Date:  Weight:            90.26 kg             Admission Status:  BSA / BMI:         1.93 m2 / 35.25      Encounter#:           [id number]  kg/m2  Blood Pressure:    140/80 mmHg          Department Location:  [facility]    Study Type:    TRANSTHORACIC ECHO (TTE) COMPLETE  Diagnosis/ICD: Shortness of breath-R06.02; Chronic diastolic (congestive) heart  failure (CHF)-I50.32; Atherosclerotic heart disease of native  coronary artery without angina pectoris-I25.10; Unspecified  atrial fibrillation-I48.91  Indication:    Short of breath, CHF, A-Fib  CPT Code:      Echo  Complete w Full Doppler-[id number]  Study Detail: The following Echo studies were performed: 2D, M-Mode and Doppler.      PHYSICIAN INTERPRETATION:  Left Ventricle: Left ventricular ejection fraction is normal, by visual estimate at 60%. There are no regional left ventricular wall motion abnormalities. The left ventricular cavity size is normal. There is mild left ventricular hypertrophy. Left ventricular diastolic filling was indeterminate.  Left Atrium: The left atrium is moderately dilated.  Right Ventricle: The right ventricle is normal in size. There is normal right ventricular global systolic function.  Right Atrium: The right atrium is moderately dilated.  Aortic Valve: The aortic valve is trileaflet. The aortic valve dimensionless index is 0.88. There is no evidence of aortic valve regurgitation. The peak instantaneous gradient of the aortic valve is 8.8 mmHg. The mean gradient of the aortic valve is 4.4 mmHg.  Mitral Valve: The mitral valve is normal in structure. There is moderate mitral valve regurgitation.  Tricuspid Valve: The tricuspid valve is structurally normal. There is mild to moderate tricuspid regurgitation. The Doppler estimated RVSP is moderately elevated at 50.8 mmHg.  Pulmonic Valve: The pulmonic valve is structurally normal. There is mild pulmonic valve regurgitation.  Pericardium: There is no pericardial effusion noted.  Aorta: The aortic root is normal.  Systemic Veins: The inferior vena cava appears mildly dilated.      CONCLUSIONS:  1. Left ventricular ejection fraction is normal, by visual estimate at 60%.  2. Left ventricular diastolic filling was indeterminate.  3. There is normal right ventricular global systolic function.  4. The left atrium is moderately dilated.  5. The right atrium is moderately dilated.  6. Moderate mitral valve regurgitation.  7. Mild to moderate tricuspid regurgitation visualized.  8. Moderately elevated right ventricular systolic pressure.  9. The inferior vena  cava appears mildly dilated.    QUANTITATIVE DATA SUMMARY:    2D MEASUREMENTS:          Normal Ranges:  RVIDd:           3.06 cm  (0.9-3.6cm)  IVSd:            1.34 cm  (0.6-1.1cm)  LVPWd:           1.30 cm  (0.6-1.1cm)  LVIDd:           4.85 cm  (3.9-5.9cm)  LVIDs:           3.24 cm  LV Mass Index:   132 g/m2  LVEDV Index:     53 ml/m2  LV % FS          33.1 %      LA VOLUME:                    Normal Ranges:  LA Vol A4C:        100.7 ml   (22+/-6mL/m2)  LA Vol A2C:        134.8 ml  LA Vol BP:         121.1 ml  LA Vol Index A4C:  52.2 ml/m2  LA Vol Index A2C:  69.9 ml/m2  LA Vol Index BP:   62.8 ml/m2  LA Area A4C:       27.1 cm2  LA Area A2C:       32.6 cm2  LA Major Axis A4C: 6.2 cm  LA Major Axis A2C: 6.7 cm  LA Vol A4C:        95.7 ml  LA Vol A2C:        125.1 ml  LA Vol Index BSA:  57.2 ml/m2      RA VOLUME BY A/L METHOD:            Normal Ranges:  RA Vol A4C:              74.8 ml    (8.3-19.5ml)  RA Vol Index A4C:        38.8 ml/m2  RA Area A4C:             21.8 cm2  RA Major Axis A4C:       5.4 cm      AORTA MEASUREMENTS:         Normal Ranges:  Ao STJ, d:          2.90 cm (1.7-3.4cm)  Asc Ao, d:          3.20 cm (2.1-3.4cm)      LV SYSTOLIC FUNCTION BY 2D PLANIMETRY (MOD):  Normal Ranges:  EF-A4C View:    52 % (>=55%)  EF-A2C View:    54 %  EF-Biplane:     52 %  EF-Visual:      60 %  LV EF Reported: 60 %      LV DIASTOLIC FUNCTION:           Normal Ranges:  MV e'                  0.010 m/s (>8.0)  MV lateral e'          0.01 m/s  MV medial e'           0.01 m/s      MITRAL VALVE:          Normal Ranges:  MV Vmax:      1.48 m/s (<=1.3m/s)  MV peak P.7 mmHg (<5mmHg)  MV mean P.4 mmHg (<2mmHg)  MV VTI:       30.81 cm (10-13cm)      AORTIC VALVE:                     Normal Ranges:  AoV Vmax:                1.48 m/s (<=1.7m/s)  AoV Peak P.8 mmHg (<20mmHg)  AoV Mean P.4 mmHg (1.7-11.5mmHg)  LVOT [place]:            1.02 m/s (<=1.1m/s)  AoV VTI:                 26.35 cm  (18-25cm)  LVOT VTI:                23.14 cm  LVOT Diameter:           2.01 cm  (1.8-2.4cm)  AoV Area, VTI:           2.78 cm2 (2.5-5.5cm2)  AoV Area,Vmax:           2.18 cm2 (2.5-4.5cm2)  AoV Dimensionless Index: 0.88      RIGHT VENTRICLE:  RV Basal 3.20 cm  RV Mid   2.30 cm  RV Major 6.1 cm  TAPSE:   22.0 mm  RV s'    0.09 m/s      TRICUSPID VALVE/RVSP:          Normal Ranges:  Peak TR Velocity:     3.46 m/s  RV Syst Pressure:     51 mmHg  (< 30mmHg)  IVC Diam:             1.60 cm      AORTA:  Asc Ao Diam 3.25 cm      [id number] Kirk Tee MD, [facility]  Electronically signed on 10/14/2024 at 5:41:17 PM        ** Final **      Guideline-Directed Medical Therapy:  -ARNI: No   -If no, then ACEi/ARB?: Yes, describe: Losartan 50mg daily  -Beta Blocker: Yes, describe: Metoprolol tartrate 25mg twice daily  -MRA: Yes, describe: Spironolactone 25mg daily  -SGLT2i: Yes, describe: Farxiga 10mg daily    Secondary Prevention:  -The ASCVD Risk score (Marcie DK, et al., 2019) failed to calculate for the following reasons:    The valid total cholesterol range is 130 to 320 mg/dL   -Aspirin 81mg? no  -Statin?: Yes, describe: Atorvastatin 40mg daily  -HTN?: Yes, describe: elevated at last OV    CURRENT PHARMACOTHERAPY:   Losartan 50mg daily- patient is not taking as prescribed  Metoprolol tartrate 25mg twice daily  Spironolactone 25mg daily  Farxiga 10mg daily    Affordability:  PAP screen- waiting on income documents  Adherence/Compliance: non adherence- patient is not taking losartan  Adverse Effects: none reported    Monitoring Weights at Home: Yes- every other day  Home Weight Recordings: 192.8lbs    Past In Office Weight Readings:   Wt Readings from Last 6 Encounters:   10/23/24 88 kg (194 lb)   10/21/24 89.8 kg (198 lb)   10/14/24 89.8 kg (198 lb)   09/30/24 88.5 kg (195 lb)   09/27/24 88.9 kg (196 lb)   09/05/24 87.1 kg (192 lb)       Monitoring Blood Pressure at Home: No- has BP cuff at home  Home BP Recordings: unable to  assess    Past In Office BP Readings:   BP Readings from Last 6 Encounters:   10/23/24 140/74   10/21/24 168/70   09/30/24 132/62   09/27/24 142/84   09/05/24 130/68   08/22/24 126/80       HEALTH MANAGEMENT    Maintaining fluid restriction (<2 L/day): N/A  Edema/swelling: No issues related to heart failure. Patient reports one leg is swollen due to knee problems  Shortness of breath: Yes; only on exertion  Trouble sleeping/lying down: No  Dry/hacking cough: Yes- every other day   Recent Hospitalizations: Yes, describe: Patient went to ED on 10/21 for SOB- diagnosis viral illness    EDUCATION/COUNSELING:   - Counseled patient on MOA, expectations, duration of therapy, contraindications, administration, and monitoring parameters  - Counseled patient on lifestyle modifications that can decrease your risk of having complications (smoking cessation, losing weight, daily weights, vaccines)  - Counseled patient on fluid intake and weight management. Recommended to not consume more than 2 liters of fliuids per day. If they have gained more than 2-3 pounds within a 24 hours period (or 5 pounds in a week), contact their cardiologist  - Answered all patient questions and concerns     DISCUSSION/NOTES:   Today was an initial visit with Zeny. Patient allergies and medications were reviewed and updated. Patient reports she is not taking losartan, she stopped taking losartan when spironolactone was started. Discussed with patient spironolactone was added in addition to losartan and other heart medications.  Patient states she is doing well on anticoagulation therapy. Patient reports adherence, no adverse effects, and denies s/s of bleeding.   Patient reports tolerating her heart failure medications well, however patient is non adherent, she is not taking her losartan as prescribed. Patient does not monitor her BP at home. It was discussed that her last few BP readings have been elevated, possibly due to non-adherence of  "losartan. When discussing with Zeny about monitoring her BP she states she is \"[place] and her BP is up most of the time\". 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 Patient currently denies s/s of worsening heart failure.  Patient was screened for  PAP. Patient reports paying $130/ 30 day supply of Eliquis. Patient reports she retired in April 2023. Plan to submit application once income documentation is received.     ASSESSMENT AND PLAN:     Patient Assistance Program (PAP)    Application for program to be submitted for the following medications: Eliquis, Farxiga    County of Permanent Address: [facility]   Prescription Insurance:   Yes   Members of Household: 1   Files Taxes: Yes       Patient will be faxing financial information to pharmacist directly at [phone].    Patient verbally reports monthly or yearly income which is less than 400% federal poverty level    Patient aware this process may take up to 6 weeks.     If approved medication must be filled through [facility] and MEDICATION WILL BE MAILED TO PATIENT.         Assessment/Plan   Problem List Items Addressed This Visit       Chronic diastolic (congestive) heart failure     Patient currently prescribed 4 of 4 GDMT medications. Patient reports non-adherence to losartan. Recommend to continue on medications as prescribed by provider. Renal function and potassium level appropriate.     Labs (10/21/2024): Scr-0.41 eGFR>90 K-3.6         Chronic atrial fibrillation (Multi) - Primary (Chronic)     Patient age, weight and renal function appropriate to continue Eliquis 5mg twice daily.         Relevant Orders    Referral to Clinical Pharmacy         RECOMMENDATIONS/PLAN    CONTINUE  Losartan 50mg daily  Metoprolol tartrate 25mg twice daily  Spironolactone 25mg daily  Farxiga 10mg daily  Eliquis 5mg twice daily    Last Appnt with Referring Provider: 09/05/2024  Next Appnt with Referring Provider: " 03/11/2025  Clinical Pharmacist follow up: 1 month  VAF/Application Expiration: pending  Type of Encounter: Bubba Tong PharmD    Verbal consent to manage patient's drug therapy was obtained from the patient . They were informed they may decline to participate or withdraw from participation in pharmacy services at any time.    Continue all meds under the continuation of care with the referring provider and clinical pharmacy team.

## 2024-11-06 NOTE — Clinical Note
Isela Tee,  Today I spoke with Zeny about her Eliquis and her heart medications. Patient states she is doing well on anticoagulation therapy. Patient reports adherence and denies s/s of bleeding. Zeny is tolerating her heart failure medications with no adverse effects, however is non-adherent with losartan. Discussed that her last few BP readings in office have been elevated, possibly due to non-adherence of losartan. Zeny feels she does not need to start monitoring her BP at this time. She currently denies s/s of worsening heart failure. I will submit PAP application once income documentation is received. Plan to continue heart failure regimen as prescribed and follow up in 1 month. Thank you!

## 2024-11-06 NOTE — ASSESSMENT & PLAN NOTE
Patient currently prescribed 4 of 4 GDMT medications. Patient reports non-adherence to losartan. Recommend to continue on medications as prescribed by provider. Renal function and potassium level appropriate.     Labs (10/21/2024): Scr-0.41 eGFR>90 K-3.6

## 2024-11-13 ENCOUNTER — APPOINTMENT (OUTPATIENT)
Dept: DERMATOLOGY | Facility: CLINIC | Age: 69
End: 2024-11-13
Payer: COMMERCIAL

## 2024-11-20 RX ORDER — APIXABAN 5 MG/1
5 TABLET, FILM COATED ORAL 2 TIMES DAILY
Qty: 60 TABLET | Refills: 11 | Status: SHIPPED | OUTPATIENT
Start: 2024-11-20

## 2024-11-26 ENCOUNTER — APPOINTMENT (OUTPATIENT)
Dept: GASTROENTEROLOGY | Facility: CLINIC | Age: 69
End: 2024-11-26
Payer: COMMERCIAL

## 2024-12-11 ENCOUNTER — APPOINTMENT (OUTPATIENT)
Dept: PHARMACY | Facility: HOSPITAL | Age: 69
End: 2024-12-11
Payer: COMMERCIAL

## 2024-12-11 ENCOUNTER — OFFICE VISIT (OUTPATIENT)
Dept: OTOLARYNGOLOGY | Facility: CLINIC | Age: 69
End: 2024-12-11
Payer: COMMERCIAL

## 2024-12-11 VITALS
TEMPERATURE: 98.3 F | WEIGHT: 190 LBS | DIASTOLIC BLOOD PRESSURE: 71 MMHG | HEIGHT: 63 IN | HEART RATE: 56 BPM | SYSTOLIC BLOOD PRESSURE: 127 MMHG | BODY MASS INDEX: 33.66 KG/M2

## 2024-12-11 DIAGNOSIS — H93.8X2 SENSATION OF PLUGGED EAR ON LEFT SIDE: ICD-10-CM

## 2024-12-11 DIAGNOSIS — H62.42 OTOMYCOSIS OF LEFT EAR: Primary | ICD-10-CM

## 2024-12-11 DIAGNOSIS — B36.9 OTOMYCOSIS OF LEFT EAR: Primary | ICD-10-CM

## 2024-12-11 DIAGNOSIS — L29.9 EAR ITCHING: ICD-10-CM

## 2024-12-11 PROCEDURE — 3048F LDL-C <100 MG/DL: CPT | Performed by: NURSE PRACTITIONER

## 2024-12-11 PROCEDURE — 3078F DIAST BP <80 MM HG: CPT | Performed by: NURSE PRACTITIONER

## 2024-12-11 PROCEDURE — 99213 OFFICE O/P EST LOW 20 MIN: CPT | Performed by: NURSE PRACTITIONER

## 2024-12-11 PROCEDURE — 1126F AMNT PAIN NOTED NONE PRSNT: CPT | Performed by: NURSE PRACTITIONER

## 2024-12-11 PROCEDURE — 1159F MED LIST DOCD IN RCRD: CPT | Performed by: NURSE PRACTITIONER

## 2024-12-11 PROCEDURE — 3074F SYST BP LT 130 MM HG: CPT | Performed by: NURSE PRACTITIONER

## 2024-12-11 PROCEDURE — 3051F HG A1C>EQUAL 7.0%<8.0%: CPT | Performed by: NURSE PRACTITIONER

## 2024-12-11 PROCEDURE — 4010F ACE/ARB THERAPY RXD/TAKEN: CPT | Performed by: NURSE PRACTITIONER

## 2024-12-11 PROCEDURE — 3008F BODY MASS INDEX DOCD: CPT | Performed by: NURSE PRACTITIONER

## 2024-12-11 RX ORDER — CLOTRIMAZOLE 1 G/ML
SOLUTION TOPICAL
Qty: 30 ML | Refills: 0 | Status: SHIPPED | OUTPATIENT
Start: 2024-12-11

## 2024-12-11 RX ORDER — FLUTICASONE PROPIONATE 50 MCG
SPRAY, SUSPENSION (ML) NASAL
Qty: 16 G | Refills: 1 | Status: SHIPPED | OUTPATIENT
Start: 2024-12-11

## 2024-12-11 SDOH — ECONOMIC STABILITY: FOOD INSECURITY: WITHIN THE PAST 12 MONTHS, THE FOOD YOU BOUGHT JUST DIDN'T LAST AND YOU DIDN'T HAVE MONEY TO GET MORE.: NEVER TRUE

## 2024-12-11 SDOH — ECONOMIC STABILITY: FOOD INSECURITY: WITHIN THE PAST 12 MONTHS, YOU WORRIED THAT YOUR FOOD WOULD RUN OUT BEFORE YOU GOT MONEY TO BUY MORE.: NEVER TRUE

## 2024-12-11 ASSESSMENT — ENCOUNTER SYMPTOMS
OCCASIONAL FEELINGS OF UNSTEADINESS: 0
DEPRESSION: 0
LOSS OF SENSATION IN FEET: 0

## 2024-12-11 ASSESSMENT — COLUMBIA-SUICIDE SEVERITY RATING SCALE - C-SSRS
1. IN THE PAST MONTH, HAVE YOU WISHED YOU WERE DEAD OR WISHED YOU COULD GO TO SLEEP AND NOT WAKE UP?: NO
6. HAVE YOU EVER DONE ANYTHING, STARTED TO DO ANYTHING, OR PREPARED TO DO ANYTHING TO END YOUR LIFE?: NO
2. HAVE YOU ACTUALLY HAD ANY THOUGHTS OF KILLING YOURSELF?: NO

## 2024-12-11 ASSESSMENT — PATIENT HEALTH QUESTIONNAIRE - PHQ9
SUM OF ALL RESPONSES TO PHQ9 QUESTIONS 1 AND 2: 0
1. LITTLE INTEREST OR PLEASURE IN DOING THINGS: NOT AT ALL
2. FEELING DOWN, DEPRESSED OR HOPELESS: NOT AT ALL

## 2024-12-11 ASSESSMENT — PAIN SCALES - GENERAL: PAINLEVEL_OUTOF10: 0-NO PAIN

## 2024-12-11 NOTE — PROGRESS NOTES
Subjective   Patient ID: Zeny Scott is a 69 y.o. female who presents for EAR CLEANING.    HPI    INITIAL VISIT 9/18/2020:  Zeny Scott is a 65 year-old female here for evaluation of her ears. They have been bothering her for about 2 weeks. She has been on an oral antibiotic and some Ciprodex drops. She notices some hearing loss and it feels like she is in a tunnel. She is not having much pain or drainage. She also has some sinus issues including postnasal drainage and some nasal congestion. No throat pain or trouble swallowing. She doesn't use any saline sprays. No previous ear surgeries. Next     9/22/2020: Started on Clotrimazole drops for 14 days, better.      11/17/2020: Patient here for her ears. She is having hearing loss, left greater than right. She denies any pain or drainage. She feels that the tinnitus is getting worse. About 1 month ago she was in the hospital for COVID-19 and then pneumonia and her hearing has been off since then. She was using Flonase nasal spray throughout her hospital stay.     8/9/2021: Patient here for evaluation of her right ear. For 2 weeks, crusty drainage in the right ear associated with hearing loss. Went to ER, started on PCN VK and Cipro drops on 8/3, not improving. No pain. Buzzing in both ears, long time. Every once in awhile dizziness. Some right-sided facial pain.      10/21/2021: Patient here for right ear. She has a lot of ear pressure and then down her neck. She feels that the right side of her face is numb. She feels that the Clotrimazole makes her ears worse.      11/23/2021: Saw Dr. Thomas. He debrided the ear & started her on Clotrimazole and Betamethasone for 14 days and recommended f/u with me in 2 weeks.      12/15/2021: Patient here for her right ear. She used the Clotrimazole drops and thinks that it made her ear worse. It is very clogged, feels like it is worse. It can cause her to feel pressure in her face. She states she has never used the  compounded powder yet, but she now has it and plans on using this.      4/24/2023: Left ear itching bad for the last couple of months. The right ear is feeling clogged. No pain or drainage.      9/28/2023: Patient here for the left ear. Every once in awhile it starts draining and it is crusty. She also feels a lump that comes and goes. At night, it is itching so bad. She has sores in the left nostril, some bleeding when she cleans it out. She also had a low grade fever this morning.     12/12/2024: Patient here today for her left ear. It is clogged and itching.  Patient fell and broke her arm unfortunately.    Patient Active Problem List   Diagnosis    Chronic LLQ pain    Chronic hepatitis C with cirrhosis (Multi)    Chronic diarrhea of unknown origin    Chronic bilateral low back pain with right-sided sciatica    Combined form of age-related cataract, right eye    Elevated AFP    Coronary artery disease    Hemangioma of skin and subcutaneous tissue    GERD (gastroesophageal reflux disease)    Esophageal varices    Left adnexal tenderness    Rhinitis    Laryngitis    Irritable bowel syndrome with diarrhea    Hyperopia, bilateral    HTN (hypertension)    Hepatic encephalopathy    Leg edema    Left knee pain    Neoplasm of uncertain behavior of skin    Melanocytic nevi of other parts of face    Melanocytic nevi of unspecified upper limb, including shoulder    Melanocytic nevi of unspecified lower limb, including hip    Melanocytic nevi of trunk    Melanocytic nevi of scalp and neck    Otomycosis of right ear    Skin changes due to chronic exposure to nonionizing radiation, unspecified    Actinic keratosis    Shortness of breath    Stress incontinence, female    Snoring    Diabetes mellitus, type 2 (Multi)    Angioma    Benign neoplasm    Intertrigo    Lentigines    Onychodystrophy    Seborrheic dermatitis    Dermatitis    Bilateral knee pain    Chronic diastolic (congestive) heart failure    Ear itch    Excessive  cerumen in left ear canal    Hyperopia with regular astigmatism    Nasal crusting    Nasal dryness    Otitis externa, left    Primary osteoarthritis of both knees    Sensation of plugged ear on right side    Achilles tendinosis of right ankle    Combined form of age-related cataract, left eye    Dry eyes, bilateral    Astigmatism of both eyes    Presbyopia    RPE mottling of macula    Asteatosis cutis    Achilles tendinitis of right lower extremity    Chronic atrial fibrillation (Multi)    Hearing loss    Hepatic cirrhosis (Multi)    Personal history of COVID-19    Tailor's bunion    Tinnitus of both ears    Uterine prolapse    Vulvovaginitis    Acute pancreatitis    Hyperthyroidism    Anxiety disorder    Class 2 obesity with body mass index (BMI) of 35.0 to 35.9 in adult    Subacute cough    Localized edema    Disease of liver    Major depression, single episode    Neck pain    Pain in joint involving pelvic region and thigh    Idiopathic progressive neuropathy    Diabetic polyneuropathy associated with type 2 diabetes mellitus (Multi)    History of skin cancer    Microalbuminuria    Goiter    High risk medication use     Past Surgical History:   Procedure Laterality Date     SECTION, CLASSIC  2015     Section    GALLBLADDER SURGERY  2015    Gallbladder Surgery    LITHOTRIPSY  2015    Renal Lithotripsy    OTHER SURGICAL HISTORY  2015    Cholecystotomy    OTHER SURGICAL HISTORY  2015    Ovarian Cystectomy     Review of Systems    All other systems have been reviewed and are negative for complaints except for those mentioned in history of present illness, past medical history and problem list.    Objective   Physical Exam    Constitutional: No fever, chills, weight loss or weight gain  General appearance: Appears well, well-nourished, well groomed. No acute distress.    Communication: Normal communication    Psychiatric: Oriented to person, place and time. Normal mood and  affect.    Neurologic: Cranial nerves II-XII grossly intact and symmetric bilaterally.    Head and Face:  Head: Atraumatic with no masses, lesions or scarring.  Face: Normal symmetry. No scars or deformities.  TMJ: Normal, no trismus.    Eyes: Conjunctiva not edematous or erythematous.     Right Ear: External inspection of ear with no deformity, scars, or masses. EAC is clear.  TM is intact with no sign of infection, effusion, or retraction.  No perforation seen.     Left Ear: External inspection of ear with no deformity, scars, or masses. EAC is with mild fungal appearing drainage that was removed using the microscope and suction. Fungal spores noted.  TM is intact with no sign of infection, effusion, or retraction.  No perforation seen.     Nose: External inspection of nose: No nasal lesions, lacerations or scars. Anterior rhinoscopy with limited visualization past the inferior turbinates. No tenderness on frontal or maxillary sinus palpation.    Oral Cavity/Mouth: Oral cavity and oropharynx mucosa moist and pink. No lesions or masses. Tonsils appear normal. Uvula is midline. Tongue with no masses or lesions. Tongue with good mobility. The oropharynx is clear.    Neck: Normal appearing, symmetric, trachea midline.     Cardiovascular: Examination of peripheral vascular system shows no clubbing or cyanosis.    Respiratory: No respiratory distress increased work of breathing. Inspection of the chest with symmetric chest expansion and normal respiratory effort.    Skin: No head and neck rashes.    Lymph nodes: No adenopathy.    Assessment/Plan   Diagnoses and all orders for this visit:  Otomycosis of left ear  -     clotrimazole (Lotrimin) 1 % external solution; Instill 4 drops into the left ear twice daily for the next 10 days.  -     fluticasone (Flonase) 50 mcg/actuation nasal spray; Administer 2 sprays into each nostril, once daily.  Ear itching  -     fluticasone (Flonase) 50 mcg/actuation nasal spray; Administer  2 sprays into each nostril, once daily.  Sensation of plugged ear on left side    Ear cleaned. Recommend following dry ear precautions and starting Clotrimazole drops for the next 10 days. Follow up in 2 weeks, or sooner if symptoms not improving.  Patient also requesting Flonase refill.  All questions answered to patient satisfaction.        JOEL Enamorado 12/11/24 4:01 PM

## 2024-12-15 DIAGNOSIS — I50.32 CHRONIC DIASTOLIC (CONGESTIVE) HEART FAILURE: ICD-10-CM

## 2024-12-16 RX ORDER — DAPAGLIFLOZIN 10 MG/1
10 TABLET, FILM COATED ORAL DAILY
Qty: 90 TABLET | Refills: 3 | Status: SHIPPED | OUTPATIENT
Start: 2024-12-16

## 2025-01-06 ENCOUNTER — APPOINTMENT (OUTPATIENT)
Dept: NEUROLOGY | Facility: CLINIC | Age: 70
End: 2025-01-06
Payer: COMMERCIAL

## 2025-01-08 NOTE — PROGRESS NOTES
Occupational Therapy Evaluation    Patient Name:  Zeny Scott   Patient MRN: [id number]  Date: 1/10/2025  Time Calculation  Start Time: 0847  Stop Time: 0925  Time Calculation (min): 38 min    OT Evaluation Time Entry  OT Evaluation (Low) Time Entry: 15  OT Therapeutic Procedures Time Entry  Therapeutic Exercise Time Entry: 23                   ASSESSMENT:  Patient was referred to occupational therapy for an evaluation and treatment s/p R nondisplaced fx of ulna shaft occurring on 11/25/24. OT evaluation completed this date.  Patient's main functional deficits include opening jars, personal hygiene tasks,  tasks, walking her dogs, dressing tasks, and lifting objects.  Patient would benefit from skilled OT in order to reduce R arm stiffness and pain and to increase R elbow/wrist AROM, gross muscle strength, /pinch strength, and fine motor skills to improve performance in ADLs and IADLs.   Patient was issued written and illustrated handouts for elbow/wrist AROM, wrist PROM stretches, tendon glides, and yellow foam squeezes for HEP to address these deficits. Patient verbalizes good understanding of HEP.    PLAN:       OT intervention plan includes: education/instruction, home program, manual therapy, therapeutic activities, therapeutic exercises, and modalities.  Frequency and duration: 2 time(s) a week, for 6 weeks.   Potential to achieve rehab goals is good.    Plan of care was developed with input and agreement by the patient.     Insurance:  Visit number: 1 of 12  Insurance Type: Payor: [employer] / Plan: [employer] / Product Type: *No Product type* /   Authorization or Plan of Care date Range: C9 OT 12V 1/6-3/6/25 DIAG S52.234A   Copay: n/a  Referred by: Leonides Parkinson PA     SUBJECTIVE:  Patient is a 69 old who attends OT evaluation today. she reports on 11/25/24 she tripped and fell at work, striking her R mid forearm and head R occipital region. X-rays  performed at [facility] indicated a nondisplaced fracture of the distal third of the ulna. Patient was immobilized in a ulnar gutter orthosis for 1 month. Patient has been cleared for OT with the instructions to perform light forearm sup/pro, avoid weightbearing until 8 weeks, and a 5# lifting restriction until 12 weeks. Patient rates current resting pain in R arm a 6/10 increasing to a 9/10 with movement and activity. Patient experiences n/t along the ulnar nerve distrubution and demonstrates a Wartenberg sign on the RUE. Patient has been wearing an OTC long wrist brace, only removing it to bath. Patient reports she has difficulties with personal hygiene tasks,  tasks, walking her dogs, dressing tasks, and lifting objects. She has been modifying activities for task completion and relying on her L hand. Patient has been taking Tylenol and soaking R arm in warm water for pain relief.     she is RHD   her chief complaint is pain in R forearm.  her goal for Occupational Therapy is to improve functional use of RUE.     she lives with her brother in a house. she is retired.     General:  Reason for visit: R nondisplaced fx of ulna shaft  Referred by: Leonides Parkinson PA           Current Problem:         Problem List Items Addressed This Visit             ICD-10-CM    Closed nondisplaced oblique fracture of shaft of right ulna - Primary S52.234A     Other Visit Diagnoses         Codes    Nondisplaced oblique fracture of shaft of right ulna, initial encounter for closed fracture     S52.234A    Relevant Orders    Follow Up In Occupational Therapy            Medical Screening:       Reviewed medical history form with patient and medical screening assessed.        Medical History Form scanned into chart    Precautions:         Fall Risk: Moderate         Denies: Pacemaker, Hypertension, other known neurologic problem, and other known pulmonary problems        Past Surgical history:        Past Medical  "history: + Cancer (Basel Cell; medically managed), Diabetes, + Heart Disease (a-fib), neuropathy, Thyroid Disorder         Precautions: NWB and forearm sup/pro until 8 weeks, light forearm sup/pro and 5# lifting restriction until 12 weeks         Pain Assessment:      Pain Assessment: 0-10      Pain (0-10): 9; 6 @ rest       Pain Location R forearm    OBJECTIVE:  Active range of motion:  R Elbow:  - Flexion: 125  - Extension: -5  - Supination: 30  - Pronation: 90    R Wrist:  - Flexion: 25  - Extension: 40  - Rd dev: 5  - Ul dev: 10    Strength (MMT):  R Elbow:   - Flexion: 4/5  - Extension: 3+/5  - Supination: 5/5  - Pronation: 3+/5    R Wrist:  - Flexion: 4/5  - Extension: 4/5  - Rd Dev. 4+/5  - Ul Dev. 4+/5     strength:  - RUE : 7#  - LUE : 34#    Pinch Strength:  - Lateral:       - RUE: 3#       - LUE: 8#  - 3 Jaw:       - RUE: 3#       - LUE: 9#    Outcome Measures:  OT Adult Other Outcome Measures:   9 Hole Peg Test: R: 32.7 secs      L: 20.2 secs   OT Adult Other Outcome Measures  Other Outcome Measures: 50 (QuickDASH)  (88.64%)      Goals:  Patient will present with a pain score of 6/10 in R forearm during activities.    Patient to increase R forearm supination AROM by 15 degrees in order to improve independent performance in daily activities.     Patient to increase R elbow extension strength by scoring a 4/5 on the MMT to improve performance in  and personal hygiene tasks.    Patient will improve R  strength by 10lb to improve performance in lifting and grasping tasks.     Patient to improve R hand coordination on 9 hole peg test to <30 secs for dexterity tasks such as writing, typing, zipping, and buttoning.     Patient to improve QuickDASH score to at or below 40% to increase independency in ADLs and IADLs.     Treatment Performed: (\"NP\" = Not Performed)     Treatment:  Low Complex OT Eval: 15 mins    Therapeutic Exercise: 23 mins   - Demonstrated elbow/wrist AROM, wrist PROM " stretches, tendon glides, and yellow foam squeezes; Provided HEP handout and yellow foam  - Educated patient regarding the relation of an ulnar nerve injury to this type of fx  - Educated patient regarding benefits of heat for reducing stiffness  - Reviewed wearing schedule of brace and current precautions  Therapeutic Activities: NP  -   Manual Therapy: NP  -   Neuromuscular Re-Education: NP  -   Modalities: NP  -     Education: Reviewed home exercise program. Access Code [email or web address]

## 2025-01-10 ENCOUNTER — EVALUATION (OUTPATIENT)
Dept: OCCUPATIONAL THERAPY | Facility: CLINIC | Age: 70
End: 2025-01-10
Payer: COMMERCIAL

## 2025-01-10 DIAGNOSIS — S52.234A: ICD-10-CM

## 2025-01-10 DIAGNOSIS — S52.234A: Primary | ICD-10-CM

## 2025-01-10 PROCEDURE — 97110 THERAPEUTIC EXERCISES: CPT | Mod: GO

## 2025-01-10 PROCEDURE — 97165 OT EVAL LOW COMPLEX 30 MIN: CPT | Mod: GO

## 2025-01-10 ASSESSMENT — PAIN - FUNCTIONAL ASSESSMENT: PAIN_FUNCTIONAL_ASSESSMENT: 0-10

## 2025-01-10 ASSESSMENT — ENCOUNTER SYMPTOMS
LOSS OF SENSATION IN FEET: 0
OCCASIONAL FEELINGS OF UNSTEADINESS: 0
DEPRESSION: 0

## 2025-01-10 ASSESSMENT — PAIN SCALES - GENERAL: PAINLEVEL_OUTOF10: 6

## 2025-01-13 ENCOUNTER — DOCUMENTATION (OUTPATIENT)
Dept: OCCUPATIONAL THERAPY | Facility: CLINIC | Age: 70
End: 2025-01-13
Payer: COMMERCIAL

## 2025-01-13 DIAGNOSIS — S52.234A: Primary | ICD-10-CM

## 2025-01-13 NOTE — PROGRESS NOTES
Occupational Therapy                 Therapy Communication Note    Patient Name: Zeny Scott  MRN: [id number]  Department: [address]   Today's Date: 1/13/2025     Discipline: Occupational Therapy    Missed Time: No Show    Comment: Patient no showed, no called today's appointment. Therapist called to remind patient of today's appointment and of her next appointment scheduled on 1/15/25.

## 2025-01-15 ENCOUNTER — TREATMENT (OUTPATIENT)
Dept: OCCUPATIONAL THERAPY | Facility: CLINIC | Age: 70
End: 2025-01-15
Payer: COMMERCIAL

## 2025-01-15 DIAGNOSIS — S52.234A: ICD-10-CM

## 2025-01-15 PROCEDURE — 97022 WHIRLPOOL THERAPY: CPT | Mod: GO | Performed by: OCCUPATIONAL THERAPIST

## 2025-01-15 PROCEDURE — 97110 THERAPEUTIC EXERCISES: CPT | Mod: GO | Performed by: OCCUPATIONAL THERAPIST

## 2025-01-15 PROCEDURE — 97140 MANUAL THERAPY 1/> REGIONS: CPT | Mod: GO | Performed by: OCCUPATIONAL THERAPIST

## 2025-01-15 NOTE — PROGRESS NOTES
OCCUPATIONAL THERAPY TREATMENT NOTE    Patient Name:  Zeny Scott   Patient MRN: [id number]  Date: 1/15/2025  Time Calculation  Start Time: 0215  Stop Time: 0255  Time Calculation (min): 40 min    Insurance:  Visit number: 2 of 12  Insurance Type: Payor: [employer] / Plan: [employer] / Product Type: *No Product type* /   Authorization or Plan of Care date Range:  C9 OT 12V 1/6-3/6/25 DIAG S52.234A   Copay: NA  Referred by: Leonides Parkinson PA        OT Therapeutic Procedures Time Entry  Manual Therapy Time Entry: 15  Therapeutic Exercise Time Entry: 10  OT Modalities Time Entry  Whirlpool Time Entry: 15                  General:  Reason for visit: R nondisplaced fx of ulna shaft  Referred by: Leonides Parkinson PA        Type of surgery: No surgery found  Date of surgery: No surgery found  Days since surgery: No surgery found    Current Problem:         Problem List Items Addressed This Visit    None  Visit Diagnoses         Codes    Nondisplaced oblique fracture of shaft of right ulna, initial encounter for closed fracture     S52.234A            Assessment:  Progress towards functional goals: Improved mobility in right wrist and digits and Reduce pain level  Response to interventions:  OT upgraded HEP to include passive wrist flexion and extension stretches.  Zeny demonstrated good technique with home exercises.  Patient was also fit and trained with a fingerless isotoner glove to reduce edema in right dorsal and volar hand to improve composite fisting.  She verbalized good understanding of wearing schedule and precautions.  Justification for continued skilled care: To address remaining functional, objective and subjective deficits to allow the patient to return to full independence with ADLs. Skilled intervention required to improve ROM which will improve function. Progressive strengthening to stabilize the right  "wrist and hand to improve function. Assess effectiveness of HEP. Modify and progress home exercise program. Reduce pain to improve function.    Plan:  Progress exercises as tolerated to improve overall UE strength and performance in daily activities , Therapeutic exercises to strengthen RUE for functional use in daily activities. , Activities to improve fine motor coordination and manual dexterity skills to improve performance in ADLs and IADLs, Continued education of techniques such as heat to decrease joint stiffness and muscle tightness., Exercises to gradually increase range of motion., Manual therapy to  muscle tightness and improve joint mobility. , and Modalities as needed to address symptoms.    Subjective:  Patient reports she is having pain in the ulnar side of wrist.  She tries to keep her brace off at home and has been wearing brace to sleep as needed.      Progress towards functional goal:  Improved mobility in right wrist  Pain Location right forearm  Pain (0-10): 7   HEP adherence / understanding: compliance with the instructed home exercises.    Precautions:  Fall Risk: Moderate    Precautions listed:  NWB and forearm sup/pro until 8 weeks, light forearm sup/pro and 5# lifting restriction until 12 weeks     Therapy diagnoses:   1. Nondisplaced oblique fracture of shaft of right ulna, initial encounter for closed fracture  Follow Up In Occupational Therapy           Objective: measured 24    Treatment Performed: (\"NP\" = Not Performed)     Therapeutic Exercise: 10 min  - fingerless isotoner glove size medium  - wrist prayer stretch for wrist extension  - wrist flexion passive self stretch  Therapeutic Activities:   -   Manual Therapy: 15 min  - joint traction and stretches to wrist, digits and forearm  Neuromuscular Re-Education:   -   Modalities: 15 min  - Fluidotherapy with AROM right hand    Education: Reviewed home exercise program.  "

## 2025-01-20 ENCOUNTER — OFFICE VISIT (OUTPATIENT)
Dept: OTOLARYNGOLOGY | Facility: CLINIC | Age: 70
End: 2025-01-20
Payer: MEDICARE

## 2025-01-20 VITALS
HEIGHT: 63 IN | WEIGHT: 190 LBS | TEMPERATURE: 97.9 F | SYSTOLIC BLOOD PRESSURE: 155 MMHG | BODY MASS INDEX: 33.66 KG/M2 | HEART RATE: 52 BPM | DIASTOLIC BLOOD PRESSURE: 67 MMHG

## 2025-01-20 DIAGNOSIS — H92.12 OTORRHEA OF LEFT EAR: ICD-10-CM

## 2025-01-20 DIAGNOSIS — H62.42 OTOMYCOSIS OF LEFT EAR: Primary | ICD-10-CM

## 2025-01-20 DIAGNOSIS — H93.8X2 SENSATION OF PLUGGED EAR ON LEFT SIDE: ICD-10-CM

## 2025-01-20 DIAGNOSIS — B36.9 OTOMYCOSIS OF LEFT EAR: Primary | ICD-10-CM

## 2025-01-20 PROCEDURE — 1159F MED LIST DOCD IN RCRD: CPT | Performed by: NURSE PRACTITIONER

## 2025-01-20 PROCEDURE — 3078F DIAST BP <80 MM HG: CPT | Performed by: NURSE PRACTITIONER

## 2025-01-20 PROCEDURE — 99214 OFFICE O/P EST MOD 30 MIN: CPT | Performed by: NURSE PRACTITIONER

## 2025-01-20 PROCEDURE — 87070 CULTURE OTHR SPECIMN AEROBIC: CPT | Mod: PARLAB | Performed by: NURSE PRACTITIONER

## 2025-01-20 PROCEDURE — 3008F BODY MASS INDEX DOCD: CPT | Performed by: NURSE PRACTITIONER

## 2025-01-20 PROCEDURE — 1036F TOBACCO NON-USER: CPT | Performed by: NURSE PRACTITIONER

## 2025-01-20 PROCEDURE — 4010F ACE/ARB THERAPY RXD/TAKEN: CPT | Performed by: NURSE PRACTITIONER

## 2025-01-20 PROCEDURE — 1126F AMNT PAIN NOTED NONE PRSNT: CPT | Performed by: NURSE PRACTITIONER

## 2025-01-20 PROCEDURE — 87102 FUNGUS ISOLATION CULTURE: CPT | Mod: PARLAB | Performed by: NURSE PRACTITIONER

## 2025-01-20 PROCEDURE — 3077F SYST BP >= 140 MM HG: CPT | Performed by: NURSE PRACTITIONER

## 2025-01-20 RX ORDER — AMOXICILLIN AND CLAVULANATE POTASSIUM 400; 57 MG/5ML; MG/5ML
POWDER, FOR SUSPENSION ORAL
Qty: 140 ML | Refills: 0 | Status: SHIPPED | OUTPATIENT
Start: 2025-01-20

## 2025-01-20 ASSESSMENT — ENCOUNTER SYMPTOMS
DEPRESSION: 0
OCCASIONAL FEELINGS OF UNSTEADINESS: 0
LOSS OF SENSATION IN FEET: 0

## 2025-01-20 ASSESSMENT — PAIN SCALES - GENERAL: PAINLEVEL_OUTOF10: 0-NO PAIN

## 2025-01-20 NOTE — PROGRESS NOTES
Subjective   Patient ID: Zeny Scott is a 69 y.o. female who presents for No chief complaint on file..    HPI    INITIAL VISIT 9/18/2020:  Zeny Scott is a 65 year-old female here for evaluation of her ears. They have been bothering her for about 2 weeks. She has been on an oral antibiotic and some Ciprodex drops. She notices some hearing loss and it feels like she is in a tunnel. She is not having much pain or drainage. She also has some sinus issues including postnasal drainage and some nasal congestion. No throat pain or trouble swallowing. She doesn't use any saline sprays. No previous ear surgeries. Next     9/22/2020: Started on Clotrimazole drops for 14 days, better.      11/17/2020: Patient here for her ears. She is having hearing loss, left greater than right. She denies any pain or drainage. She feels that the tinnitus is getting worse. About 1 month ago she was in the hospital for COVID-19 and then pneumonia and her hearing has been off since then. She was using Flonase nasal spray throughout her hospital stay.     8/9/2021: Patient here for evaluation of her right ear. For 2 weeks, crusty drainage in the right ear associated with hearing loss. Went to ER, started on PCN VK and Cipro drops on 8/3, not improving. No pain. Buzzing in both ears, long time. Every once in awhile dizziness. Some right-sided facial pain.      10/21/2021: Patient here for right ear. She has a lot of ear pressure and then down her neck. She feels that the right side of her face is numb. She feels that the Clotrimazole makes her ears worse.      11/23/2021: Saw Dr. Thomas. He debrided the ear & started her on Clotrimazole and Betamethasone for 14 days and recommended f/u with me in 2 weeks.      12/15/2021: Patient here for her right ear. She used the Clotrimazole drops and thinks that it made her ear worse. It is very clogged, feels like it is worse. It can cause her to feel pressure in her face. She states she has never  used the compounded powder yet, but she now has it and plans on using this.      4/24/2023: Left ear itching bad for the last couple of months. The right ear is feeling clogged. No pain or drainage.      9/28/2023: Patient here for the left ear. Every once in awhile it starts draining and it is crusty. She also feels a lump that comes and goes. At night, it is itching so bad. She has sores in the left nostril, some bleeding when she cleans it out. She also had a low grade fever this morning.     12/12/2024: Patient here today for her left ear. It is clogged and itching.  Patient fell and broke her arm unfortunately.    1/20/2025: Patient following up for her left ear. She is still having drainage and she can't hear well out of the ear. She used the Clotrimazole drops, not any real improvement.     Patient Active Problem List   Diagnosis    Chronic LLQ pain    Chronic hepatitis C with cirrhosis (Multi)    Chronic diarrhea of unknown origin    Chronic bilateral low back pain with right-sided sciatica    Combined form of age-related cataract, right eye    Elevated AFP    Coronary artery disease    Hemangioma of skin and subcutaneous tissue    GERD (gastroesophageal reflux disease)    Esophageal varices    Left adnexal tenderness    Rhinitis    Laryngitis    Irritable bowel syndrome with diarrhea    Hyperopia, bilateral    HTN (hypertension)    Hepatic encephalopathy    Leg edema    Left knee pain    Neoplasm of uncertain behavior of skin    Melanocytic nevi of other parts of face    Melanocytic nevi of unspecified upper limb, including shoulder    Melanocytic nevi of unspecified lower limb, including hip    Melanocytic nevi of trunk    Melanocytic nevi of scalp and neck    Otomycosis of right ear    Skin changes due to chronic exposure to nonionizing radiation, unspecified    Actinic keratosis    Shortness of breath    Stress incontinence, female    Snoring    Diabetes mellitus, type 2 (Multi)    Angioma    Benign  neoplasm    Intertrigo    Lentigines    Onychodystrophy    Seborrheic dermatitis    Dermatitis    Bilateral knee pain    Chronic diastolic (congestive) heart failure    Ear itch    Excessive cerumen in left ear canal    Hyperopia with regular astigmatism    Nasal crusting    Nasal dryness    Otitis externa, left    Primary osteoarthritis of both knees    Sensation of plugged ear on right side    Achilles tendinosis of right ankle    Combined form of age-related cataract, left eye    Dry eyes, bilateral    Astigmatism of both eyes    Presbyopia    RPE mottling of macula    Asteatosis cutis    Achilles tendinitis of right lower extremity    Chronic atrial fibrillation (Multi)    Hearing loss    Hepatic cirrhosis (Multi)    Personal history of COVID-19    Tailor's bunion    Tinnitus of both ears    Uterine prolapse    Vulvovaginitis    Acute pancreatitis    Hyperthyroidism    Anxiety disorder    Class 2 obesity with body mass index (BMI) of 35.0 to 35.9 in adult    Subacute cough    Localized edema    Disease of liver    Major depression, single episode    Neck pain    Pain in joint involving pelvic region and thigh    Idiopathic progressive neuropathy    Diabetic polyneuropathy associated with type 2 diabetes mellitus (Multi)    History of skin cancer    Microalbuminuria    Goiter    High risk medication use    Closed nondisplaced oblique fracture of shaft of right ulna     Past Surgical History:   Procedure Laterality Date     SECTION, CLASSIC  2015     Section    GALLBLADDER SURGERY  2015    Gallbladder Surgery    LITHOTRIPSY  2015    Renal Lithotripsy    OTHER SURGICAL HISTORY  2015    Cholecystotomy    OTHER SURGICAL HISTORY  2015    Ovarian Cystectomy     Review of Systems    All other systems have been reviewed and are negative for complaints except for those mentioned in history of present illness, past medical history and problem list.    Objective   Physical  Exam    Right Ear: External inspection of ear with no deformity, scars, or masses. EAC is clear.  TM is intact with no sign of infection, effusion, or retraction.  No perforation seen.     Left Ear: External inspection of ear with no deformity, scars, or masses. EAC is with mild fungal appearing drainage that was removed using the microscope and suction. Fungal spores noted.  TM is intact with no sign of infection, effusion, or retraction.  No perforation seen.     Assessment/Plan   Diagnoses and all orders for this visit:  Otomycosis of left ear  Sensation of plugged ear on left side    Ear cleaned. Recommend following dry ear precautions.  Culture was sent for misc and fungal. I will follow up with results.   Patient requesting oral Augmentin suspension.  I explained I recommend topical treatment, but I am fine with trying an oral antibiotic due to her persistent symptoms.   Infection appears mild, so I don't feel oral fluconazole is needed.   I also sent a compounded powder to [facility], gold dust #5. Use Clotrimazole until the powder arrives. Recommend following up in 2 weeks.    All questions answered to patient satisfaction.     JAYME Enamorado-CNP 01/20/25 9:46 AM

## 2025-01-21 ENCOUNTER — TELEPHONE (OUTPATIENT)
Dept: GASTROENTEROLOGY | Facility: CLINIC | Age: 70
End: 2025-01-21

## 2025-01-21 ENCOUNTER — APPOINTMENT (OUTPATIENT)
Dept: GASTROENTEROLOGY | Facility: CLINIC | Age: 70
End: 2025-01-21
Payer: MEDICARE

## 2025-01-21 VITALS
WEIGHT: 192 LBS | HEIGHT: 63 IN | SYSTOLIC BLOOD PRESSURE: 162 MMHG | HEART RATE: 68 BPM | DIASTOLIC BLOOD PRESSURE: 84 MMHG | BODY MASS INDEX: 34.02 KG/M2

## 2025-01-21 DIAGNOSIS — K62.5 RECTAL BLEEDING: Primary | ICD-10-CM

## 2025-01-21 DIAGNOSIS — K59.04 CHRONIC IDIOPATHIC CONSTIPATION: ICD-10-CM

## 2025-01-21 DIAGNOSIS — Z12.11 COLON CANCER SCREENING: Primary | ICD-10-CM

## 2025-01-21 PROCEDURE — 3008F BODY MASS INDEX DOCD: CPT | Performed by: INTERNAL MEDICINE

## 2025-01-21 PROCEDURE — 3079F DIAST BP 80-89 MM HG: CPT | Performed by: INTERNAL MEDICINE

## 2025-01-21 PROCEDURE — 4010F ACE/ARB THERAPY RXD/TAKEN: CPT | Performed by: INTERNAL MEDICINE

## 2025-01-21 PROCEDURE — 3077F SYST BP >= 140 MM HG: CPT | Performed by: INTERNAL MEDICINE

## 2025-01-21 PROCEDURE — 1036F TOBACCO NON-USER: CPT | Performed by: INTERNAL MEDICINE

## 2025-01-21 PROCEDURE — 99214 OFFICE O/P EST MOD 30 MIN: CPT | Performed by: INTERNAL MEDICINE

## 2025-01-21 PROCEDURE — 1159F MED LIST DOCD IN RCRD: CPT | Performed by: INTERNAL MEDICINE

## 2025-01-21 RX ORDER — SODIUM, POTASSIUM,MAG SULFATES 17.5-3.13G
1 SOLUTION, RECONSTITUTED, ORAL ORAL 2 TIMES DAILY
Qty: 2 EACH | Refills: 0 | Status: SHIPPED | OUTPATIENT
Start: 2025-01-21

## 2025-01-21 ASSESSMENT — ENCOUNTER SYMPTOMS
MUSCULOSKELETAL NEGATIVE: 1
RESPIRATORY NEGATIVE: 1
CONSTITUTIONAL NEGATIVE: 1
CARDIOVASCULAR NEGATIVE: 1

## 2025-01-21 NOTE — PROGRESS NOTES
OCCUPATIONAL THERAPY TREATMENT NOTE    Patient Name:  Zeny Scott   Patient MRN: [id number]  Date: 1/22/2025       Insurance:  Visit number: 3 of 12  Insurance Type: Payor: [employer] / Plan: [employer] / Product Type: *No Product type* /   Authorization or Plan of Care date Range:  C9 OT 12V 1/6-3/6/25 DIAG S52.234A   Copay: NA  Referred by: Leonides Parkinson PA                              General:  Reason for visit: R nondisplaced fx of ulna shaft  Referred by: Leonides Parkinson PA     Current Problem:         Problem List Items Addressed This Visit    None        Assessment:  Progress towards functional goals: Improved mobility in right wrist and digits and Reduce pain level  Response to interventions:  OT upgraded HEP to include passive wrist flexion and extension stretches.  Zeny demonstrated good technique with home exercises.  Patient was also fit and trained with a fingerless isotoner glove to reduce edema in right dorsal and volar hand to improve composite fisting.  She verbalized good understanding of wearing schedule and precautions.  Justification for continued skilled care: To address remaining functional, objective and subjective deficits to allow the patient to return to full independence with ADLs. Skilled intervention required to improve ROM which will improve function. Progressive strengthening to stabilize the right wrist and hand to improve function. Assess effectiveness of HEP. Modify and progress home exercise program. Reduce pain to improve function.    Plan:  Progress exercises as tolerated to improve overall UE strength and performance in daily activities , Therapeutic exercises to strengthen RUE for functional use in daily activities. , Activities to improve fine motor coordination and manual dexterity skills to improve performance in ADLs and IADLs, Continued education of techniques such as  "heat to decrease joint stiffness and muscle tightness., Exercises to gradually increase range of motion., Manual therapy to  muscle tightness and improve joint mobility. , and Modalities as needed to address symptoms.    Subjective:  Patient reports she is having pain in the ulnar side of wrist.  She tries to keep her brace off at home and has been wearing brace to sleep as needed.      Progress towards functional goal:  Improved mobility in right wrist  Pain Location right forearm  Pain (0-10): 7   HEP adherence / understanding: compliance with the instructed home exercises.    Precautions:  Fall Risk: Moderate    Precautions listed:  NWB and forearm sup/pro until 8 weeks, light forearm sup/pro and 5# lifting restriction until 12 weeks     Therapy diagnoses:   No diagnosis found.       Objective: measured 24    Treatment Performed: (\"NP\" = Not Performed)     Therapeutic Exercise: 10 min  - wrist prayer stretch for wrist extension  - wrist flexion passive self stretch    Therapeutic Activities: NP  -   Manual Therapy: 15 min  - joint traction and stretches to wrist, digits and forearm    Neuromuscular Re-Education: NP  -   Modalities: 15 min  - Fluidotherapy with AROM right hand    Education: Reviewed home exercise program.  "

## 2025-01-21 NOTE — PROGRESS NOTES
Subjective     History of Present Illness:   Zeny Scott is a 69 y.o. female who presents to GI clinic for rectal bleeding  Has been happening for 2-3 months now  Was wiping herself and seeing blood on the tissue but would also see it in the toilet, sometimes a lot of blood in the stool   .  Was concerned because of eliquis  has problem with consttipation where she has to strain to have a bowel movement  Also at some point had 3 accidents because she thought she was backed up       Review of Systems  Review of Systems   Constitutional: Negative.    Respiratory: Negative.     Cardiovascular: Negative.    Musculoskeletal: Negative.    Skin: Negative.        Past Medical History   has a past medical history of Actinic keratosis, Basal cell carcinoma, Cataract, Chronic atrial fibrillation (Multi) (03/07/2023), Diabetes mellitus (Multi), Personal history of other diseases of the circulatory system, Personal history of other diseases of the circulatory system, Personal history of other endocrine, nutritional and metabolic disease, Personal history of other endocrine, nutritional and metabolic disease, Personal history of other endocrine, nutritional and metabolic disease, Personal history of other endocrine, nutritional and metabolic disease, Personal history of other infectious and parasitic diseases, Personal history of other specified conditions (10/30/2019), and Shortness of breath.     Social History   reports that she has never smoked. She has never used smokeless tobacco. She reports current alcohol use. She reports that she does not use drugs.     Family History  family history includes Alzheimer's disease in her mother; Hypertension in her father; Kidney disease in her father; Liver cancer in her brother; cardiac disorder in her father.     Allergies  Allergies   Allergen Reactions    Oxycodone Shortness of breath    Oxycodone-Acetaminophen Shortness of breath    Cyclobenzaprine Unknown     Other  "Reaction(s): Drowsy    Nickel Unknown       Medications  Current Outpatient Medications   Medication Instructions    amLODIPine (NORVASC) 5 mg, oral, Daily    amoxicillin-pot clavulanate (Augmentin) 400-57 mg/5 mL suspension Take 10 mL twice daily for the next 7 days    atorvastatin (Lipitor) 40 mg tablet Take by mouth.    clotrimazole (Lotrimin) 1 % external solution Instill 4 drops into the left ear twice daily for the next 10 days.    Eliquis 5 mg, oral, 2 times daily    Farxiga 10 mg, oral, Daily    fluticasone (Flonase) 50 mcg/actuation nasal spray Administer 2 sprays into each nostril, once daily.    FreeStyle Main 2 [place] misc use as directed to monitor blood glucose continuously    FreeStyle Main 2 Sensor kit use as directed and change every 14 days to monitor blood glucose continuously. use reader to scan at least 3 times per day    FreeStyle Precision Maninder Strips strip use as directed to monitor blood glucose three times daily    gabapentin (NEURONTIN) 200 mg, oral, Nightly    glimepiride (AMARYL) 4 mg, Daily before breakfast    ketoconazole (NIZOral) 2 % cream Topical, 2 times daily    ketoconazole (NIZOral) 2 % shampoo Topical, Every other day    LORazepam (ATIVAN) 1 mg, Every 6 hours PRN    losartan (Cozaar) 50 mg tablet 1 tablet, Daily (0630)    methIMAzole (TAPAZOLE) 5 mg, Daily    metoprolol tartrate (LOPRESSOR) 25 mg, 2 times daily    nitroglycerin (NITROSTAT) 0.4 mg, Every 5 min PRN    omeprazole (PRILOSEC) 40 mg, oral, Daily    spironolactone (ALDACTONE) 25 mg, oral, Daily    triamcinolone (Kenalog) 0.025 % ointment Topical, 2 times daily    triamcinolone (Kenalog) 0.1 % cream Apply topically 2 times a day for 14 days, then reduce frequency to twice a week.  Repeat as needed, but do not use more than 2 weeks per month.       Objective   /84 (BP Location: Left arm, Patient Position: Sitting)   Pulse 68   Ht 1.6 m (5' 3\")   Wt 87.1 kg (192 lb)   BMI 34.01 kg/m²   Physical Exam  Vitals " reviewed.   Constitutional:       Appearance: Normal appearance.   Cardiovascular:      Rate and Rhythm: Normal rate and regular rhythm.      Pulses: Normal pulses.   Pulmonary:      Effort: Pulmonary effort is normal.      Breath sounds: Normal breath sounds.   Abdominal:      General: Abdomen is flat. Bowel sounds are normal. There is no distension.      Palpations: Abdomen is soft.      Tenderness: There is no abdominal tenderness.   Musculoskeletal:         General: Normal range of motion.   Neurological:      Mental Status: She is alert.                 Assessment/Plan   Zeny Scott is a 69 y.o. female who presents to GI clinic for rectal bleeding    I think this is likely hemorrhoids with bleeding from constipation and straining  Accidents are likely related to overflow and constipation    Will do an xray and transit study   Given that her last colonsocopy was 6 years ago and she had AVMs I will plan on repeating colonoscopy to evaluate the new bleeding  Schedule at NR, no concerns for cardiac disease, ok to stop eliquis 2 days before  .colace daily  May need anorectal manometry           Josep Melton MD

## 2025-01-21 NOTE — TELEPHONE ENCOUNTER
Patient would like SUTAB- is aware insurance may not cover, in that case she will do the SUPREP- Has instructions for both.

## 2025-01-22 ENCOUNTER — DOCUMENTATION (OUTPATIENT)
Dept: OCCUPATIONAL THERAPY | Facility: CLINIC | Age: 70
End: 2025-01-22
Payer: MEDICARE

## 2025-01-22 ENCOUNTER — APPOINTMENT (OUTPATIENT)
Dept: OCCUPATIONAL THERAPY | Facility: CLINIC | Age: 70
End: 2025-01-22
Payer: COMMERCIAL

## 2025-01-22 DIAGNOSIS — S52.234A: Primary | ICD-10-CM

## 2025-01-22 LAB
BACTERIA SPEC CULT: ABNORMAL
GRAM STN SPEC: ABNORMAL
GRAM STN SPEC: ABNORMAL

## 2025-01-22 RX ORDER — SOD SULF/POT CHLORIDE/MAG SULF 1.479 G
12 TABLET ORAL 2 TIMES DAILY
Qty: 24 TABLET | Refills: 0 | Status: SHIPPED | OUTPATIENT
Start: 2025-01-22

## 2025-01-22 NOTE — PROGRESS NOTES
OCCUPATIONAL THERAPY TREATMENT NOTE    Patient Name:  Zeny Scott   Patient MRN: [id number]  Date: 1/24/2025  Time Calculation  Start Time: 0848  Stop Time: 0932  Time Calculation (min): 44 min    Insurance:  Visit number: 3 of 12  Insurance Type: Payor: [employer] / Plan: [employer] / Product Type: *No Product type* /   Authorization or Plan of Care date Range:  C9 OT 12V 1/6-3/6/25 DIAG S52.234A   Copay: NA  Referred by: Leonides Parkinson PA        OT Therapeutic Procedures Time Entry  Manual Therapy Time Entry: 19  Therapeutic Exercise Time Entry: 10  OT Modalities Time Entry  Whirlpool Time Entry: 15                  General:  Reason for visit: R nondisplaced fx of ulna shaft  Referred by: Leonides Parkinson PA      Current Problem:         Problem List Items Addressed This Visit             ICD-10-CM    Closed nondisplaced oblique fracture of shaft of right ulna - Primary S52.234A     Other Visit Diagnoses         Codes    Nondisplaced oblique fracture of shaft of right ulna, initial encounter for closed fracture     S52.234A            Assessment:  Progress towards functional goals: Improved mobility in right wrist and digits, Reduce pain level, and Reduce muscle tightness  Response to interventions:  OT began aggressive stretching to elbow, wrist, and digits. Deep stretching sensation experienced during elbow flex/ext and forearm sup/pro. Improved elbow and wrist mobility after manual stretches. Updated HEP to include elbow and wrist PROM exercises and L arm strengthening exercises, per patient's request.   Justification for continued skilled care: To address remaining functional, objective and subjective deficits to allow the patient to return to full independence with ADLs. Skilled intervention required to improve ROM which will improve function. Progressive strengthening to stabilize the right wrist and hand to  "improve function. Assess effectiveness of HEP. Modify and progress home exercise program. Reduce pain to improve function.    Plan:  Progress exercises as tolerated to improve overall UE strength and performance in daily activities , Therapeutic exercises to strengthen RUE for functional use in daily activities. , Activities to improve fine motor coordination and manual dexterity skills to improve performance in ADLs and IADLs, Continued education of techniques such as heat to decrease joint stiffness and muscle tightness., Exercises to gradually increase range of motion., Manual therapy to  muscle tightness and improve joint mobility. , and Modalities as needed to address symptoms.    Subjective:  Patient reports she had a follow up with her orthopedic on 25 who stated the fracture is healing well and to begin aggressive stretching to the elbow, wrist, and hand. May remove brace throughout the day to improve AROM. Patient states she has been using her R arm often during daily activities.      Progress towards functional goal:  Improved mobility in right wrist  Pain Location right forearm  Pain (0-10): 4   HEP adherence / understanding: compliance with the instructed home exercises.    Precautions:  Fall Risk: Moderate    Precautions listed:  5# lifting restriction     Therapy diagnoses:   1. Closed nondisplaced oblique fracture of shaft of right ulna, initial encounter        2. Nondisplaced oblique fracture of shaft of right ulna, initial encounter for closed fracture  Follow Up In Occupational Therapy           Objective: measured 24    Treatment Performed: (\"NP\" = Not Performed)     Therapeutic Exercise: 10 min  - Updated HEP to include passive elbow and wrist stretches and wrist PRE's and upper arm strengthening exercises for LUE    Therapeutic Activities: NP  -   Manual Therapy: 19 min  - joint traction and aggressive stretches to elbow, wrist, digits and forearm    Neuromuscular " Re-Education:   -   Modalities: 15 min  - Fluidotherapy with AROM right hand    Education: Added R elbow and wrist PROM stretches and L arm strengthening  exercise(s) to HEP program Access Code [email or web address]

## 2025-01-22 NOTE — PROGRESS NOTES
Occupational Therapy                 Therapy Communication Note    Patient Name: Zeny Scott  MRN: [id number]  Department:   Room: Room/bed info not found  Today's Date: 1/22/2025     Discipline: Occupational Therapy    Missed Visit Reason:  Weather    Missed Time: Cancel

## 2025-01-23 ENCOUNTER — TELEPHONE (OUTPATIENT)
Dept: ORTHOPEDIC SURGERY | Facility: CLINIC | Age: 70
End: 2025-01-23
Payer: MEDICARE

## 2025-01-23 DIAGNOSIS — M17.0 ARTHRITIS OF BOTH KNEES: Primary | ICD-10-CM

## 2025-01-23 LAB
FUNGUS SPEC CULT: NORMAL
FUNGUS SPEC FUNGUS STN: NORMAL

## 2025-01-23 NOTE — TELEPHONE ENCOUNTER
Patient of [facility] asking for a renewal of her Handicap Placard. Current one expires this month. If ok for how long please? Thanks Priscila

## 2025-01-24 ENCOUNTER — TREATMENT (OUTPATIENT)
Dept: OCCUPATIONAL THERAPY | Facility: CLINIC | Age: 70
End: 2025-01-24
Payer: COMMERCIAL

## 2025-01-24 DIAGNOSIS — S52.234A CLOSED NONDISPLACED OBLIQUE FRACTURE OF SHAFT OF RIGHT ULNA, INITIAL ENCOUNTER: Primary | ICD-10-CM

## 2025-01-24 DIAGNOSIS — S52.234A: ICD-10-CM

## 2025-01-24 PROCEDURE — 97110 THERAPEUTIC EXERCISES: CPT | Mod: GO

## 2025-01-24 PROCEDURE — 97022 WHIRLPOOL THERAPY: CPT | Mod: GO

## 2025-01-24 PROCEDURE — 97140 MANUAL THERAPY 1/> REGIONS: CPT | Mod: GO

## 2025-01-27 NOTE — PROGRESS NOTES
OCCUPATIONAL THERAPY TREATMENT NOTE    Patient Name:  Zeny Scott   Patient MRN: [id number]  Date: 1/28/2025  Time Calculation  Start Time: 0950  Stop Time: 1030  Time Calculation (min): 40 min    Insurance:  Visit number: 4 of 12  Insurance Type: Payor: [employer] / Plan: [employer] / Product Type: *No Product type* /   Authorization or Plan of Care date Range:  C9 OT 12V 1/6-3/6/25 DIAG S52.234A   Copay: NA  Referred by: Leonides Parkinson PA        OT Therapeutic Procedures Time Entry  Manual Therapy Time Entry: 15  Therapeutic Activity Time Entry: 15  OT Modalities Time Entry  Whirlpool Time Entry: 10                  General:  Reason for visit: R nondisplaced fx of ulna shaft  Referred by: Leonides Parkinson PA      Current Problem:         Problem List Items Addressed This Visit             ICD-10-CM    Closed nondisplaced oblique fracture of shaft of right ulna - Primary S52.234A     Other Visit Diagnoses         Codes    Nondisplaced oblique fracture of shaft of right ulna, initial encounter for closed fracture     S52.234A              Assessment:  Progress towards functional goals: Improved mobility in right wrist and digits and Reduce pain level  Response to interventions:  Retrograde massage performed to reduce swelling noted to R forearm, wrist, hand, and digit. Increased mobility achieved in elbow and wrist flex/ext. Continues to have pain over distal forearm with forearm sup/pro. ROM remains reduced in with forearm sup/pro. Introduced BTE program without resistance to improve elbow and wrist AROM. Mild pain noted to distal aspect of forearm during activities.   Justification for continued skilled care: To address remaining functional, objective and subjective deficits to allow the patient to return to full independence with ADLs. Skilled intervention required to improve ROM which will improve function.  "Progressive strengthening to stabilize the right wrist and hand to improve function. Assess effectiveness of HEP. Modify and progress home exercise program. Reduce pain to improve function.    Plan:  Progress exercises as tolerated to improve overall UE strength and performance in daily activities , Therapeutic exercises to strengthen RUE for functional use in daily activities. , Activities to improve fine motor coordination and manual dexterity skills to improve performance in ADLs and IADLs, Continued education of techniques such as heat to decrease joint stiffness and muscle tightness., Exercises to gradually increase range of motion., Manual therapy to  muscle tightness and improve joint mobility. , and Modalities as needed to address symptoms.    Subjective:  Patient reports she believes she over worked her R arm during HEP exercises. She has noticed increased sharp pain over TFCC area and swelling in the arm.      Progress towards functional goal:  Improved mobility in right wrist  Pain Location right forearm  Pain (0-10): 7   HEP adherence / understanding: compliance with the instructed home exercises.    Precautions:  Fall Risk: Moderate    Precautions listed:  5# lifting restriction     Therapy diagnoses:   1. Closed nondisplaced oblique fracture of shaft of right ulna, initial encounter        2. Nondisplaced oblique fracture of shaft of right ulna, initial encounter for closed fracture  Follow Up In Occupational Therapy             Objective: measured 24    Treatment Performed: (\"NP\" = Not Performed)     Therapeutic Exercise: NP    Therapeutic Activities: 15 mins  -  forward/retro (NR); 120 secs each  -  forearm sup/pro (NR); 120 secs   -  wrist flex/ext (NR); 120 secs    Manual Therapy: 15 min  - joint traction and manual stretches to elbow, wrist, digits and forearm  - Retrograde massage to forearm, wrist, hand, and digits     Neuromuscular Re-Education:   - "   Modalities: 10 min  - Fluidotherapy with AROM right hand    Education: Reviewed home exercise program.

## 2025-01-28 ENCOUNTER — TREATMENT (OUTPATIENT)
Dept: OCCUPATIONAL THERAPY | Facility: CLINIC | Age: 70
End: 2025-01-28
Payer: COMMERCIAL

## 2025-01-28 DIAGNOSIS — S52.234A CLOSED NONDISPLACED OBLIQUE FRACTURE OF SHAFT OF RIGHT ULNA, INITIAL ENCOUNTER: Primary | ICD-10-CM

## 2025-01-28 DIAGNOSIS — S52.234A: ICD-10-CM

## 2025-01-28 PROCEDURE — 97140 MANUAL THERAPY 1/> REGIONS: CPT | Mod: GO

## 2025-01-28 PROCEDURE — 97022 WHIRLPOOL THERAPY: CPT | Mod: GO

## 2025-01-28 PROCEDURE — 97530 THERAPEUTIC ACTIVITIES: CPT | Mod: GO

## 2025-01-28 NOTE — PROGRESS NOTES
OCCUPATIONAL THERAPY TREATMENT NOTE    Patient Name:  Zeny Scott   Patient MRN: [id number]  Date: 1/30/2025  Time Calculation  Start Time: 1037  Stop Time: 1127  Time Calculation (min): 50 min    Insurance:  Visit number: 5 of 12  Insurance Type: Payor: [employer] / Plan: [employer] / Product Type: *No Product type* /   Authorization or Plan of Care date Range:  C9 OT 12V 1/6-3/6/25 DIAG S52.234A   Copay: NA  Referred by: Leonides Parkinson PA        OT Therapeutic Procedures Time Entry  Manual Therapy Time Entry: 20  Therapeutic Exercise Time Entry: 5  OT Modalities Time Entry  Ultrasound Time Entry: 10  Whirlpool Time Entry: 15                  General:  Reason for visit: R nondisplaced fx of ulna shaft  Referred by: Leonides Parkinson PA      Current Problem:         Problem List Items Addressed This Visit             ICD-10-CM    Closed nondisplaced oblique fracture of shaft of right ulna - Primary S52.234A     Other Visit Diagnoses         Codes    Nondisplaced oblique fracture of shaft of right ulna, initial encounter for closed fracture     S52.234A            Assessment:  Progress towards functional goals: Improved mobility in right wrist and digits and Reduce pain level  Response to interventions:  No swelling noted to fx site on R forearm. Achieved full elbow PROM and functional wrist PROM. Continues to have pain over distal forearm with forearm sup/pro Decreased stiffness in digits with manual stretches. Ultrasound was performed to radial fossa, dorsal aspect of distal forearm, and carpal tunnel to promote healing of surrounding structures. Therapist educated patient regarding paraffin techniques to improve full active composite fist. Patient verbalized understanding of paraffin techniques.   Justification for continued skilled care: To address remaining functional, objective and subjective deficits to allow the  patient to return to full independence with ADLs. Skilled intervention required to improve ROM which will improve function. Progressive strengthening to stabilize the right wrist and hand to improve function. Assess effectiveness of HEP. Modify and progress home exercise program. Reduce pain to improve function.    Plan:  Progress exercises as tolerated to improve overall UE strength and performance in daily activities , Therapeutic exercises to strengthen RUE for functional use in daily activities. , Activities to improve fine motor coordination and manual dexterity skills to improve performance in ADLs and IADLs, Continued education of techniques such as heat to decrease joint stiffness and muscle tightness., Exercises to gradually increase range of motion., Manual therapy to  muscle tightness and improve joint mobility. , and Modalities as needed to address symptoms.    Subjective:  Patient presents to session with a bruise on the lateral side of her R eye and a bruise on her R upper arm. Patient explain on Saturday she slipped at home and fell, landing on her R side. She explains she hit her upper arm first and her R elbow and forearm landed lightly on the floor. She was wearing her wrist brace and states she did not re-injure her elbow/forearm. Patient did hit her head but reports no s/s of a concussion. Is planning to schedule a follow up with her PCP tomorrow. Notes the bruises did not form until yesterday. Patient had her basil cell removed on her L hand yesterday and notes some swelling to the L hand. Continues to perform her HEP exercises but has pain over the fx line with forearm sup/pro. Is unable to actively make a tight fist with her R hand. Zeny purchased a paraffin in hopes the heat will help reduce the hand stiffness.      Progress towards functional goal:  Improved mobility in right wrist  Pain Location right forearm  Pain (0-10): 7   HEP adherence / understanding: compliance with the  "instructed home exercises.    Precautions:  Fall Risk: Moderate    Precautions listed:  5# lifting restriction     Therapy diagnoses:   1. Closed nondisplaced oblique fracture of shaft of right ulna, initial encounter        2. Nondisplaced oblique fracture of shaft of right ulna, initial encounter for closed fracture  Follow Up In Occupational Therapy        Objective: measured 12/30/24    Treatment Performed: (\"NP\" = Not Performed)     Therapeutic Exercise: 5 mins  - Educated patient regarding paraffin techniques to improve active composite fist and to reduce digit stiffness    Therapeutic Activities: NP    Manual Therapy: 20 min  - joint traction and manual stretches to elbow, wrist, digits and forearm in all planes     Neuromuscular Re-Education:   -   Modalities: 25 min  - Fluidotherapy with AROM right hand- 15 mins  - Ultrasound to radial fossa, dorsal aspect of distal forearm, and carpal tunnel 3Mhz, 1.0W/cm2, continuous 10 min       Education: Reviewed home exercise program.   "

## 2025-01-29 ENCOUNTER — APPOINTMENT (OUTPATIENT)
Dept: DERMATOLOGY | Facility: CLINIC | Age: 70
End: 2025-01-29
Payer: COMMERCIAL

## 2025-01-29 DIAGNOSIS — D48.5 NEOPLASM OF UNCERTAIN BEHAVIOR OF SKIN: ICD-10-CM

## 2025-01-29 PROCEDURE — 11102 TANGNTL BX SKIN SINGLE LES: CPT | Performed by: DERMATOLOGY

## 2025-01-29 PROCEDURE — 99024 POSTOP FOLLOW-UP VISIT: CPT | Performed by: DERMATOLOGY

## 2025-01-29 NOTE — PROGRESS NOTES
Office Visit Note  Date: 1/29/2025  Surgeon:  Herbert Nino MD PhD  Office Location: [address] [address]  Dept: [phone]  Dept Fax: [phone]  Referring Provider: Liliana Martínez, [address]  [address], [address],  [facility]    Subjective   Zeny Scott is a 69 y.o. female who presents for the following: MOHS Surgery    According to the patient, the lesion has been present for approximately greater than 1 year at the time of diagnosis.  The lesion is not causing symptoms.  The lesion has not been treated previously.    The patient does not have a pacemaker / defibrillator.  The patient does not have a heart valve / joint replacement.    The patient is on blood thinners.  The patient does not have a history of hepatitis B or C.  The patient does not have a history of HIV.  The patient does not have a history of immunosuppression (e.g. organ transplantation, malignancy, medications)    Review of Systems:  No other skin or systemic complaints other than what is documented elsewhere in the note.    MEDICAL HISTORY: clinically relevant history including significant past medical history, medications and allergies was reviewed and documented in Epic.    Objective   Well appearing patient in no apparent distress; mood and affect are within normal limits.  There is a small scar without evidence of neoplasm on the left 2nd knuckle.    The patient confirmed the identified site.    Assessment      The pathology was reviewed -  suggestive of basal cell carcinoma, but possibly an AK. Repeat biopsy vs Mohs were offered.    Pt. elected for a re biopsy, to rule out AK vs BCC vs Scar.   The site was prepped and a shave biopsy was taken and submitted for formalin processing.    Herbert Nino MD, PhD

## 2025-01-30 ENCOUNTER — TREATMENT (OUTPATIENT)
Dept: OCCUPATIONAL THERAPY | Facility: CLINIC | Age: 70
End: 2025-01-30
Payer: COMMERCIAL

## 2025-01-30 DIAGNOSIS — S52.234A: ICD-10-CM

## 2025-01-30 DIAGNOSIS — S52.234A CLOSED NONDISPLACED OBLIQUE FRACTURE OF SHAFT OF RIGHT ULNA, INITIAL ENCOUNTER: Primary | ICD-10-CM

## 2025-01-30 PROCEDURE — 97035 APP MDLTY 1+ULTRASOUND EA 15: CPT | Mod: GO

## 2025-01-30 PROCEDURE — 97022 WHIRLPOOL THERAPY: CPT | Mod: GO

## 2025-01-30 PROCEDURE — 97140 MANUAL THERAPY 1/> REGIONS: CPT | Mod: GO

## 2025-01-30 NOTE — PROGRESS NOTES
OCCUPATIONAL THERAPY TREATMENT NOTE    Patient Name:  Zeny Scott   Patient MRN: [id number]  Date: 2/3/2025  Time Calculation  Start Time: 0934  Stop Time: 1015  Time Calculation (min): 41 min    Insurance:  Visit number: 6 of 12  Insurance Type: Payor: [employer] / Plan: [employer] / Product Type: *No Product type* /   Authorization or Plan of Care date Range:  C9 OT 12V 1/6-3/6/25 DIAG S52.234A   Copay: NA  Referred by: Leonides Parkinson PA        OT Therapeutic Procedures Time Entry  Manual Therapy Time Entry: 21  Therapeutic Exercise Time Entry: 8  OT Modalities Time Entry  Whirlpool Time Entry: 12                  General:  Reason for visit: R nondisplaced fx of ulna shaft  Referred by: Leonides Parkinson PA      Current Problem:         Problem List Items Addressed This Visit             ICD-10-CM    Closed nondisplaced oblique fracture of shaft of right ulna - Primary S52.234A     Other Visit Diagnoses         Codes    Nondisplaced oblique fracture of shaft of right ulna, initial encounter for closed fracture     S52.234A              Assessment:  Progress towards functional goals: Improved mobility in right wrist and digits and Reduce pain level  Response to interventions:  Light bruise-like discoloration noted over dorsal aspect of distal forearm. Tenderness to ulnar aspect of forearm and swelling noted. Objective measurements noted increase AROM in elbow and wrist. Weakness in gross muscle strength noted to elbow and wrist. Light mobilization and stretching noted to elbow to avoid aggravating forearm pain. Increased mobility achieved in wrist and digits after stretching. Notes no pain with wrist/digit manual therapy. Instructed patient to use paraffin today to prevent soreness in wrist and digits.    Justification for continued skilled care: To address remaining functional, objective and subjective deficits to  allow the patient to return to full independence with ADLs. Skilled intervention required to improve ROM which will improve function. Progressive strengthening to stabilize the right wrist and hand to improve function. Assess effectiveness of HEP. Modify and progress home exercise program. Reduce pain to improve function.    Plan:  Progress exercises as tolerated to improve overall UE strength and performance in daily activities , Therapeutic exercises to strengthen RUE for functional use in daily activities. , Activities to improve fine motor coordination and manual dexterity skills to improve performance in ADLs and IADLs, Continued education of techniques such as heat to decrease joint stiffness and muscle tightness., Exercises to gradually increase range of motion., Manual therapy to  muscle tightness and improve joint mobility. , and Modalities as needed to address symptoms.    Subjective:  Patient reports she has had severe pain in the forearm this weekend. Pain is located over ulna aspect of forearm. Notes swelling to wrist and distal forearm. Continues to wear her brace.       Progress towards functional goal:  Improved mobility in right wrist  Pain Location right forearm  Pain (0-10): 8   HEP adherence / understanding: compliance with the instructed home exercises.    Precautions:  Fall Risk: Moderate    Precautions listed:  5# lifting restriction     Therapy diagnoses:   1. Closed nondisplaced oblique fracture of shaft of right ulna, initial encounter        2. Nondisplaced oblique fracture of shaft of right ulna, initial encounter for closed fracture  Follow Up In Occupational Therapy          Objective: measured 24, 2/3/25  Active range of motion:  R Elbow:  - Flexion: 120 (-5)  - Extension: -3 (+2)  - Supination: 55 (+25)  - Pronation: 80 (-10)     R Wrist:  - Flexion: 40 (+15)  - Extension: 50 (+10)  - Rd dev: 15 (+10)  - Ul dev: 25 (+15)     Strength (MMT):  R Elbow:   - Flexion: 4-/5  "(decline)  - Extension: 3+/5 (no change)  - Supination: 4/5 (decline)   - Pronation: 5/5 (improvement)     R Wrist:  - Flexion: 5/5 (improvement)  - Extension: 4/5 (no change)  - Rd Dev. 4+/5 (no change)  - Ul Dev. 5/5 (improvement)    Treatment Performed: (\"NP\" = Not Performed)     Therapeutic Exercise: 8 mins  - Re-assessed objective measurements  - Discussed scheduling follow up with orthopedic soon if forearm pain continues to increase     Therapeutic Activities: NP    Manual Therapy: 21 min  - joint traction and manual stretches to elbow, wrist, digits and forearm in all planes     Neuromuscular Re-Education:   -   Modalities: 12 min  - Fluidotherapy with AROM right hand- 12 mins    Education: Reviewed home exercise program.   "

## 2025-02-03 ENCOUNTER — TREATMENT (OUTPATIENT)
Dept: OCCUPATIONAL THERAPY | Facility: CLINIC | Age: 70
End: 2025-02-03
Payer: COMMERCIAL

## 2025-02-03 DIAGNOSIS — S52.234A CLOSED NONDISPLACED OBLIQUE FRACTURE OF SHAFT OF RIGHT ULNA, INITIAL ENCOUNTER: Primary | ICD-10-CM

## 2025-02-03 DIAGNOSIS — S52.234A: ICD-10-CM

## 2025-02-03 PROCEDURE — 97110 THERAPEUTIC EXERCISES: CPT | Mod: GO

## 2025-02-03 PROCEDURE — 97140 MANUAL THERAPY 1/> REGIONS: CPT | Mod: GO

## 2025-02-03 PROCEDURE — 97022 WHIRLPOOL THERAPY: CPT | Mod: GO

## 2025-02-04 NOTE — PROGRESS NOTES
OCCUPATIONAL THERAPY TREATMENT NOTE    Patient Name:  Zeny Scott   Patient MRN: [id number]  Date: 2/5/2025  Time Calculation  Start Time: 0935  Stop Time: 1016  Time Calculation (min): 41 min    Insurance:  Visit number: 7 of 12  Insurance Type: Payor: [employer] / Plan: [employer] / Product Type: *No Product type* /   Authorization or Plan of Care date Range:  C9 OT 12V 1/6-3/6/25 DIAG S52.234A   Copay: NA  Referred by: Leonides Parkinson PA        OT Therapeutic Procedures Time Entry  Manual Therapy Time Entry: 18  Therapeutic Activity Time Entry: 8  OT Modalities Time Entry  Whirlpool Time Entry: 15                General:  Reason for visit: R nondisplaced fx of ulna shaft  Referred by: Leonides Parkinson PA      Current Problem:         Problem List Items Addressed This Visit             ICD-10-CM    Closed nondisplaced oblique fracture of shaft of right ulna - Primary S52.234A     Other Visit Diagnoses         Codes    Nondisplaced oblique fracture of shaft of right ulna, initial encounter for closed fracture     S52.234A                Assessment:  Progress towards functional goals: Improved mobility in right wrist and digits and Reduce pain level  Response to interventions:  Continues to have pain with forearm supination. Improved mobility achieved in wrist and digits after manual therapy. BTE program initiated with no resistance to elicit wrist AROM. Patient notes pain in thumb and forearm during activities. Rest breaks provided. Yovanny strap provided for digits IV and V d/t abduction of digit V from other digits. Patient verbalized understanding of wearing schedule and how to don/doff.   Justification for continued skilled care: To address remaining functional, objective and subjective deficits to allow the patient to return to full independence with ADLs. Skilled intervention required to improve ROM which will  "improve function. Progressive strengthening to stabilize the right wrist and hand to improve function. Assess effectiveness of HEP. Modify and progress home exercise program. Reduce pain to improve function.    Plan:  Progress exercises as tolerated to improve overall UE strength and performance in daily activities , Therapeutic exercises to strengthen RUE for functional use in daily activities. , Activities to improve fine motor coordination and manual dexterity skills to improve performance in ADLs and IADLs, Continued education of techniques such as heat to decrease joint stiffness and muscle tightness., Exercises to gradually increase range of motion., Manual therapy to  muscle tightness and improve joint mobility. , and Modalities as needed to address symptoms.    Subjective:  Patient reports she washed dishes, took care of the dogs, and completed dressing and personal hygiene tasks with the R forearm and experienced soreness over fx line and distal forearm. Continues to wear her wrist brace.       Progress towards functional goal:  Improved mobility in right wrist  Pain Location right forearm  Pain (0-10): 6   HEP adherence / understanding: compliance with the instructed home exercises.    Precautions:  Fall Risk: Moderate    Precautions listed:  5# lifting restriction     Therapy diagnoses:   1. Closed nondisplaced oblique fracture of shaft of right ulna, initial encounter        2. Nondisplaced oblique fracture of shaft of right ulna, initial encounter for closed fracture  Follow Up In Occupational Therapy            Objective: measured 24, 2/3/25    Treatment Performed: (\"NP\" = Not Performed)     Therapeutic Exercise: NP    Therapeutic Activities:  8 mins  -  wrist flex/ext NR; 120 secs   -  wrist rd/ul dev NR; 2 x 60 secs     Manual Therapy: 18 min  - joint traction and manual stretches to elbow, wrist, and digits in all planes   - Buddy strap provided for digits IV and " V    Neuromuscular Re-Education:   -   Modalities: 15 min  - Fluidotherapy with AROM right hand- 15 mins    Education: Reviewed home exercise program.

## 2025-02-05 ENCOUNTER — OFFICE VISIT (OUTPATIENT)
Dept: OTOLARYNGOLOGY | Facility: CLINIC | Age: 70
End: 2025-02-05
Payer: MEDICARE

## 2025-02-05 ENCOUNTER — APPOINTMENT (OUTPATIENT)
Dept: DERMATOLOGY | Facility: CLINIC | Age: 70
End: 2025-02-05
Payer: COMMERCIAL

## 2025-02-05 ENCOUNTER — TREATMENT (OUTPATIENT)
Dept: OCCUPATIONAL THERAPY | Facility: CLINIC | Age: 70
End: 2025-02-05
Payer: COMMERCIAL

## 2025-02-05 VITALS — WEIGHT: 188 LBS | HEIGHT: 63 IN | TEMPERATURE: 97.5 F | BODY MASS INDEX: 33.31 KG/M2

## 2025-02-05 DIAGNOSIS — S52.234A: ICD-10-CM

## 2025-02-05 DIAGNOSIS — B36.9 OTOMYCOSIS OF LEFT EAR: Primary | ICD-10-CM

## 2025-02-05 DIAGNOSIS — H93.8X2 SENSATION OF PLUGGED EAR ON LEFT SIDE: ICD-10-CM

## 2025-02-05 DIAGNOSIS — L29.9 EAR ITCHING: ICD-10-CM

## 2025-02-05 DIAGNOSIS — S52.234A CLOSED NONDISPLACED OBLIQUE FRACTURE OF SHAFT OF RIGHT ULNA, INITIAL ENCOUNTER: Primary | ICD-10-CM

## 2025-02-05 DIAGNOSIS — H62.42 OTOMYCOSIS OF LEFT EAR: Primary | ICD-10-CM

## 2025-02-05 PROCEDURE — 1036F TOBACCO NON-USER: CPT | Performed by: NURSE PRACTITIONER

## 2025-02-05 PROCEDURE — 1126F AMNT PAIN NOTED NONE PRSNT: CPT | Performed by: NURSE PRACTITIONER

## 2025-02-05 PROCEDURE — 99213 OFFICE O/P EST LOW 20 MIN: CPT | Performed by: NURSE PRACTITIONER

## 2025-02-05 PROCEDURE — 4010F ACE/ARB THERAPY RXD/TAKEN: CPT | Performed by: NURSE PRACTITIONER

## 2025-02-05 PROCEDURE — 97022 WHIRLPOOL THERAPY: CPT | Mod: GO

## 2025-02-05 PROCEDURE — 97140 MANUAL THERAPY 1/> REGIONS: CPT | Mod: GO

## 2025-02-05 PROCEDURE — 3008F BODY MASS INDEX DOCD: CPT | Performed by: NURSE PRACTITIONER

## 2025-02-05 PROCEDURE — 97530 THERAPEUTIC ACTIVITIES: CPT | Mod: GO

## 2025-02-05 PROCEDURE — 1159F MED LIST DOCD IN RCRD: CPT | Performed by: NURSE PRACTITIONER

## 2025-02-05 ASSESSMENT — PAIN SCALES - GENERAL: PAINLEVEL_OUTOF10: 0-NO PAIN

## 2025-02-05 ASSESSMENT — ENCOUNTER SYMPTOMS
LOSS OF SENSATION IN FEET: 0
DEPRESSION: 0
OCCASIONAL FEELINGS OF UNSTEADINESS: 0

## 2025-02-05 NOTE — PROGRESS NOTES
Subjective   Patient ID: Zeny Scott is a 69 y.o. female who presents for Follow-up (ears).    HPI    INITIAL VISIT 9/18/2020:  Zeny Scott is a 65 year-old female here for evaluation of her ears. They have been bothering her for about 2 weeks. She has been on an oral antibiotic and some Ciprodex drops. She notices some hearing loss and it feels like she is in a tunnel. She is not having much pain or drainage. She also has some sinus issues including postnasal drainage and some nasal congestion. No throat pain or trouble swallowing. She doesn't use any saline sprays. No previous ear surgeries. Next     9/22/2020: Started on Clotrimazole drops for 14 days, better.      11/17/2020: Patient here for her ears. She is having hearing loss, left greater than right. She denies any pain or drainage. She feels that the tinnitus is getting worse. About 1 month ago she was in the hospital for COVID-19 and then pneumonia and her hearing has been off since then. She was using Flonase nasal spray throughout her hospital stay.     8/9/2021: Patient here for evaluation of her right ear. For 2 weeks, crusty drainage in the right ear associated with hearing loss. Went to ER, started on PCN VK and Cipro drops on 8/3, not improving. No pain. Buzzing in both ears, long time. Every once in awhile dizziness. Some right-sided facial pain.      10/21/2021: Patient here for right ear. She has a lot of ear pressure and then down her neck. She feels that the right side of her face is numb. She feels that the Clotrimazole makes her ears worse.      11/23/2021: Saw Dr. Thomas. He debrided the ear & started her on Clotrimazole and Betamethasone for 14 days and recommended f/u with me in 2 weeks.      12/15/2021: Patient here for her right ear. She used the Clotrimazole drops and thinks that it made her ear worse. It is very clogged, feels like it is worse. It can cause her to feel pressure in her face. She states she has never used the  compounded powder yet, but she now has it and plans on using this.      4/24/2023: Left ear itching bad for the last couple of months. The right ear is feeling clogged. No pain or drainage.      9/28/2023: Patient here for the left ear. Every once in awhile it starts draining and it is crusty. She also feels a lump that comes and goes. At night, it is itching so bad. She has sores in the left nostril, some bleeding when she cleans it out. She also had a low grade fever this morning.     12/12/2024: Patient here today for her left ear. It is clogged and itching.  Patient fell and broke her arm unfortunately.    1/20/2025: Patient following up for her left ear. She is still having drainage and she can't hear well out of the ear. She used the Clotrimazole drops, not any real improvement.     2/5/2025: Patient following up for her ear. She has been using the Clotrimazole ear drops. Has not used the compounded powder, wasn't able to pick it up. It's blocked and itching. No pain.     Patient Active Problem List   Diagnosis    Chronic LLQ pain    Chronic hepatitis C with cirrhosis (Multi)    Chronic diarrhea of unknown origin    Chronic bilateral low back pain with right-sided sciatica    Combined form of age-related cataract, right eye    Elevated AFP    Coronary artery disease    Hemangioma of skin and subcutaneous tissue    GERD (gastroesophageal reflux disease)    Esophageal varices    Left adnexal tenderness    Rhinitis    Laryngitis    Irritable bowel syndrome with diarrhea    Hyperopia, bilateral    HTN (hypertension)    Hepatic encephalopathy    Leg edema    Left knee pain    Neoplasm of uncertain behavior of skin    Melanocytic nevi of other parts of face    Melanocytic nevi of unspecified upper limb, including shoulder    Melanocytic nevi of unspecified lower limb, including hip    Melanocytic nevi of trunk    Melanocytic nevi of scalp and neck    Otomycosis of right ear    Skin changes due to chronic exposure to  nonionizing radiation, unspecified    Actinic keratosis    Shortness of breath    Stress incontinence, female    Snoring    Diabetes mellitus, type 2 (Multi)    Angioma    Benign neoplasm    Intertrigo    Lentigines    Onychodystrophy    Seborrheic dermatitis    Dermatitis    Bilateral knee pain    Chronic diastolic (congestive) heart failure    Ear itch    Excessive cerumen in left ear canal    Hyperopia with regular astigmatism    Nasal crusting    Nasal dryness    Otitis externa, left    Primary osteoarthritis of both knees    Sensation of plugged ear on right side    Achilles tendinosis of right ankle    Combined form of age-related cataract, left eye    Dry eyes, bilateral    Astigmatism of both eyes    Presbyopia    RPE mottling of macula    Asteatosis cutis    Achilles tendinitis of right lower extremity    Chronic atrial fibrillation (Multi)    Hearing loss    Hepatic cirrhosis (Multi)    Personal history of COVID-19    Tailor's bunion    Tinnitus of both ears    Uterine prolapse    Vulvovaginitis    Acute pancreatitis    Hyperthyroidism    Anxiety disorder    Class 2 obesity with body mass index (BMI) of 35.0 to 35.9 in adult    Subacute cough    Localized edema    Disease of liver    Major depression, single episode    Neck pain    Pain in joint involving pelvic region and thigh    Idiopathic progressive neuropathy    Diabetic polyneuropathy associated with type 2 diabetes mellitus (Multi)    History of skin cancer    Microalbuminuria    Goiter    High risk medication use    Closed nondisplaced oblique fracture of shaft of right ulna     Past Surgical History:   Procedure Laterality Date     SECTION, CLASSIC  2015     Section    GALLBLADDER SURGERY  2015    Gallbladder Surgery    LITHOTRIPSY  2015    Renal Lithotripsy    OTHER SURGICAL HISTORY  2015    Cholecystotomy    OTHER SURGICAL HISTORY  2015    Ovarian Cystectomy     Review of Systems    All other  systems have been reviewed and are negative for complaints except for those mentioned in history of present illness, past medical history and problem list.    Objective   Physical Exam    Right Ear: External inspection of ear with no deformity, scars, or masses. EAC is clear.  TM is intact with no sign of infection, effusion, or retraction.  No perforation seen.     Left Ear: External inspection of ear with no deformity, scars, or masses. EAC is with mild fungal appearing drainage that was removed using the microscope and suction.  TM is intact with no sign of infection, effusion, or retraction. No define landmarks, it is red.  No perforation seen.     Assessment/Plan   Diagnoses and all orders for this visit:  Otomycosis of left ear  Sensation of plugged ear on left side    Ear cleaned. Recommend following dry ear precautions.  We discussed the importance of starting the compounded powder. Continue it for 2 weeks.  Recommend following up in 2 weeks.    All questions answered to patient satisfaction.     JOEL Enamorado 02/05/25 11:38 AM

## 2025-02-06 ENCOUNTER — APPOINTMENT (OUTPATIENT)
Dept: GASTROENTEROLOGY | Facility: EXTERNAL LOCATION | Age: 70
End: 2025-02-06
Payer: MEDICARE

## 2025-02-07 LAB
LABORATORY COMMENT REPORT: NORMAL
PATH REPORT.ADDENDUM SPEC: NORMAL
PATH REPORT.FINAL DX SPEC: NORMAL
PATH REPORT.GROSS SPEC: NORMAL
PATH REPORT.RELEVANT HX SPEC: NORMAL
PATH REPORT.TOTAL CANCER: NORMAL

## 2025-02-10 ENCOUNTER — DOCUMENTATION (OUTPATIENT)
Dept: OCCUPATIONAL THERAPY | Facility: CLINIC | Age: 70
End: 2025-02-10
Payer: MEDICARE

## 2025-02-10 DIAGNOSIS — S52.234A CLOSED NONDISPLACED OBLIQUE FRACTURE OF SHAFT OF RIGHT ULNA, INITIAL ENCOUNTER: Primary | ICD-10-CM

## 2025-02-10 NOTE — PROGRESS NOTES
Occupational Therapy                 Therapy Communication Note    Patient Name: Zeny Scott  MRN: [id number]  Today's Date: 2/10/2025     Discipline: Occupational Therapy    Missed Time: No Show    Comment: Patient no showed/no called today's OT appointment

## 2025-02-10 NOTE — PROGRESS NOTES
OCCUPATIONAL THERAPY TREATMENT NOTE    Patient Name:  Zeny Scott   Patient MRN: [id number]  Date: 2/12/2025  Time Calculation  Start Time: 0830  Stop Time: 0917  Time Calculation (min): 47 min    Insurance:  Visit number: 8 of 12  Insurance Type: Payor: [employer] / Plan: [employer] / Product Type: *No Product type* /   Authorization or Plan of Care date Range:  C9 OT 12V 1/6-3/6/25 DIAG S52.234A   Copay: NA  Referred by: Leonides Parkinson PA        OT Therapeutic Procedures Time Entry  Manual Therapy Time Entry: 22  Therapeutic Activity Time Entry: 10  OT Modalities Time Entry  Whirlpool Time Entry: 15                General:  Reason for visit: R nondisplaced fx of ulna shaft  Referred by: Leonides Parkinson PA      Current Problem:         Problem List Items Addressed This Visit             ICD-10-CM    Closed nondisplaced oblique fracture of shaft of right ulna - Primary S52.234A     Other Visit Diagnoses         Codes    Nondisplaced oblique fracture of shaft of right ulna, initial encounter for closed fracture     S52.234A          Assessment:  Progress towards functional goals: Improved mobility in right wrist and digits and Reduce pain level  Response to interventions:  Retrograde massage performed to reduce digit and hand swelling. Achieved increased digit mobility with aggressive stretching and contract and hold stretching method. Continued BTE program without resistance to improve wrist ROM. Experienced pain in forearm during exercises, but notes it was less than last session. Patient completed  within her pain range. Fingerless compression glove provided to assist in swelling reduction in R hand. Therapist instructed patient to wear the glove at night and periodically throughout the day.    Justification for continued skilled care: To address remaining functional, objective and subjective deficits to  "allow the patient to return to full independence with ADLs. Skilled intervention required to improve ROM which will improve function. Progressive strengthening to stabilize the right wrist and hand to improve function. Assess effectiveness of HEP. Modify and progress home exercise program. Reduce pain to improve function.    Plan:  Progress exercises as tolerated to improve overall UE strength and performance in daily activities , Therapeutic exercises to strengthen RUE for functional use in daily activities. , Exercises to gradually increase range of motion., Manual therapy to  muscle tightness and improve joint mobility. , and Modalities as needed to address symptoms.    Subjective:  Patient reports she continues to have pain along the ulnar aspect of her forearm. Patient reports she still cannot make a tight fist. D/t this, she dropped 2 tea cups this weekend.       Progress towards functional goal:  Improved mobility in right wrist  Pain Location right forearm  Pain (0-10): 6   HEP adherence / understanding: compliance with the instructed home exercises.    Precautions:  Fall Risk: Moderate    Precautions listed:  5# lifting restriction     Therapy diagnoses:   1. Closed nondisplaced oblique fracture of shaft of right ulna, initial encounter        2. Nondisplaced oblique fracture of shaft of right ulna, initial encounter for closed fracture  Follow Up In Occupational Therapy        Objective: measured 24, 2/3/25    Treatment Performed: (\"NP\" = Not Performed)     Therapeutic Exercise: NP    Therapeutic Activities: 10 mins  -  wrist flex/ext NR; 120 secs   -  wrist rd/ul dev NR; 2 x 60 secs   -  forearm sup/pro NR; 2 x 60 secs     Manual Therapy: 22 min  - joint traction and manual stretches to elbow, wrist, and digits in all planes   - Contract and hold stretching method in composite fist  - Retrograde massage to digits and hand   - Provided fingerless compression glove to " reduce hand and finger swelling    Neuromuscular Re-Education:   -   Modalities: 15 min  - Fluidotherapy with AROM right hand- 15 mins    Education: Reviewed home exercise program.

## 2025-02-12 ENCOUNTER — TREATMENT (OUTPATIENT)
Dept: OCCUPATIONAL THERAPY | Facility: CLINIC | Age: 70
End: 2025-02-12
Payer: COMMERCIAL

## 2025-02-12 DIAGNOSIS — S52.234A: ICD-10-CM

## 2025-02-12 DIAGNOSIS — S52.234A CLOSED NONDISPLACED OBLIQUE FRACTURE OF SHAFT OF RIGHT ULNA, INITIAL ENCOUNTER: Primary | ICD-10-CM

## 2025-02-12 PROCEDURE — 97022 WHIRLPOOL THERAPY: CPT | Mod: GO

## 2025-02-12 PROCEDURE — 97140 MANUAL THERAPY 1/> REGIONS: CPT | Mod: GO

## 2025-02-12 PROCEDURE — 97530 THERAPEUTIC ACTIVITIES: CPT | Mod: GO

## 2025-02-12 NOTE — PROGRESS NOTES
OCCUPATIONAL THERAPY TREATMENT NOTE    Patient Name:  Zeny Scott   Patient MRN: [id number]  Date: 2/17/2025  Time Calculation  Start Time: 0851  Stop Time: 0930  Time Calculation (min): 39 min    Insurance:  Visit number: 9 of 12  Insurance Type: Payor: [employer] / Plan: [employer] / Product Type: *No Product type* /   Authorization or Plan of Care date Range:  C9 OT 12V 1/6-3/6/25 DIAG S52.234A   Copay: NA  Referred by: Leonides Parkinson PA        OT Therapeutic Procedures Time Entry  Manual Therapy Time Entry: 14  Therapeutic Exercise Time Entry: 10  OT Modalities Time Entry  Whirlpool Time Entry: 15                General:  Reason for visit: R nondisplaced fx of ulna shaft  Referred by: Leonides Parkinson PA      Current Problem:         Problem List Items Addressed This Visit             ICD-10-CM    Closed nondisplaced oblique fracture of shaft of right ulna - Primary S52.234A     Other Visit Diagnoses         Codes    Nondisplaced oblique fracture of shaft of right ulna, initial encounter for closed fracture     S52.234A            Assessment:  Progress towards functional goals: Improved mobility in right wrist and digits and Reduce pain level  Response to interventions:  Increased mobility achieved in digits after manual stretches. Patient notes minimal pain with digit stretches. Introduced white theraputty activities to improve digit mobility and /pinch strength. Patient notes difficulties with digits IV and V during pinch activities. HEP handout and white theraputty provided.    Justification for continued skilled care: To address remaining functional, objective and subjective deficits to allow the patient to return to full independence with ADLs. Skilled intervention required to improve ROM which will improve function. Progressive strengthening to stabilize the right wrist and hand to improve function.  "Assess effectiveness of HEP. Modify and progress home exercise program. Reduce pain to improve function.    Plan:  Progress exercises as tolerated to improve overall UE strength and performance in daily activities , Therapeutic exercises to strengthen RUE for functional use in daily activities. , Exercises to gradually increase range of motion., Manual therapy to  muscle tightness and improve joint mobility. , and Modalities as needed to address symptoms.    Subjective:  Patient reports her whole body is sore. She has been using her RUE more often in daily activities. Continues to have pain along ulnar aspect of forearm.       Progress towards functional goal:  Improved mobility in right wrist and Improved performance in daily activities   Pain Location right wrist  Pain (0-10): 6   HEP adherence / understanding: compliance with the instructed home exercises.    Precautions:  Fall Risk: Moderate    Precautions listed:  5# lifting restriction     Therapy diagnoses:   1. Closed nondisplaced oblique fracture of shaft of right ulna, initial encounter        2. Nondisplaced oblique fracture of shaft of right ulna, initial encounter for closed fracture  Follow Up In Occupational Therapy          Objective: measured 24, 2/3/25    Treatment Performed: (\"NP\" = Not Performed)     Therapeutic Exercise: 10 mins  - Updated HEP to include theraputty activities; HEP handout and white theraputty provided    Therapeutic Activities: NP    Manual Therapy: 14 min  - joint traction and manual stretches to elbow, wrist, and digits in all planes   - Contract and hold stretching method in composite fist    Neuromuscular Re-Education:   -   Modalities: 15 min  - Fluidotherapy with AROM right hand- 15 mins    Education: Added white theraputty exercise(s) to HEP program Access Code [email or web address]   "

## 2025-02-17 ENCOUNTER — TREATMENT (OUTPATIENT)
Dept: OCCUPATIONAL THERAPY | Facility: CLINIC | Age: 70
End: 2025-02-17
Payer: COMMERCIAL

## 2025-02-17 DIAGNOSIS — S52.234A CLOSED NONDISPLACED OBLIQUE FRACTURE OF SHAFT OF RIGHT ULNA, INITIAL ENCOUNTER: Primary | ICD-10-CM

## 2025-02-17 DIAGNOSIS — S52.234A: ICD-10-CM

## 2025-02-17 PROBLEM — H60.92 OTITIS EXTERNA, LEFT: Status: RESOLVED | Noted: 2023-10-25 | Resolved: 2025-02-17

## 2025-02-17 PROBLEM — G60.3 IDIOPATHIC PROGRESSIVE NEUROPATHY: Status: RESOLVED | Noted: 2024-10-01 | Resolved: 2025-02-17

## 2025-02-17 PROBLEM — M25.562 LEFT KNEE PAIN: Status: RESOLVED | Noted: 2023-10-25 | Resolved: 2025-02-17

## 2025-02-17 PROBLEM — H91.90 HEARING LOSS: Status: RESOLVED | Noted: 2024-02-21 | Resolved: 2025-02-17

## 2025-02-17 PROBLEM — M25.559 PAIN IN JOINT INVOLVING PELVIC REGION AND THIGH: Status: RESOLVED | Noted: 2024-08-15 | Resolved: 2025-02-17

## 2025-02-17 PROBLEM — M76.61 ACHILLES TENDINITIS OF RIGHT LOWER EXTREMITY: Status: RESOLVED | Noted: 2024-02-21 | Resolved: 2025-02-17

## 2025-02-17 PROBLEM — H61.22 EXCESSIVE CERUMEN IN LEFT EAR CANAL: Status: RESOLVED | Noted: 2023-10-25 | Resolved: 2025-02-17

## 2025-02-17 PROBLEM — H93.8X1 SENSATION OF PLUGGED EAR ON RIGHT SIDE: Status: RESOLVED | Noted: 2023-10-25 | Resolved: 2025-02-17

## 2025-02-17 PROBLEM — H52.229: Status: RESOLVED | Noted: 2023-10-25 | Resolved: 2025-02-17

## 2025-02-17 PROBLEM — M25.561 BILATERAL KNEE PAIN: Status: RESOLVED | Noted: 2023-10-25 | Resolved: 2025-02-17

## 2025-02-17 PROBLEM — H52.00: Status: RESOLVED | Noted: 2023-10-25 | Resolved: 2025-02-17

## 2025-02-17 PROBLEM — Z86.16 PERSONAL HISTORY OF COVID-19: Status: RESOLVED | Noted: 2023-01-17 | Resolved: 2025-02-17

## 2025-02-17 PROBLEM — R03.0 ELEVATED BLOOD-PRESSURE READING WITHOUT DIAGNOSIS OF HYPERTENSION: Status: ACTIVE | Noted: 2024-11-27

## 2025-02-17 PROBLEM — J34.89 NASAL CRUSTING: Status: RESOLVED | Noted: 2023-10-25 | Resolved: 2025-02-17

## 2025-02-17 PROBLEM — M25.562 BILATERAL KNEE PAIN: Status: RESOLVED | Noted: 2023-10-25 | Resolved: 2025-02-17

## 2025-02-17 PROBLEM — J34.89 NASAL DRYNESS: Status: RESOLVED | Noted: 2023-10-25 | Resolved: 2025-02-17

## 2025-02-17 PROCEDURE — 97140 MANUAL THERAPY 1/> REGIONS: CPT | Mod: GO

## 2025-02-17 PROCEDURE — 97110 THERAPEUTIC EXERCISES: CPT | Mod: GO

## 2025-02-17 PROCEDURE — 97022 WHIRLPOOL THERAPY: CPT | Mod: GO

## 2025-02-18 ENCOUNTER — HOSPITAL ENCOUNTER (OUTPATIENT)
Dept: RADIOLOGY | Facility: CLINIC | Age: 70
End: 2025-02-18
Payer: MEDICARE

## 2025-02-19 ENCOUNTER — APPOINTMENT (OUTPATIENT)
Dept: GASTROENTEROLOGY | Facility: EXTERNAL LOCATION | Age: 70
End: 2025-02-19
Payer: MEDICARE

## 2025-02-19 ENCOUNTER — APPOINTMENT (OUTPATIENT)
Dept: OCCUPATIONAL THERAPY | Facility: CLINIC | Age: 70
End: 2025-02-19
Payer: COMMERCIAL

## 2025-02-19 VITALS
DIASTOLIC BLOOD PRESSURE: 87 MMHG | OXYGEN SATURATION: 97 % | SYSTOLIC BLOOD PRESSURE: 156 MMHG | RESPIRATION RATE: 16 BRPM | HEART RATE: 92 BPM | TEMPERATURE: 98.2 F

## 2025-02-19 DIAGNOSIS — K62.5 RECTAL BLEEDING: Primary | ICD-10-CM

## 2025-02-19 PROBLEM — Z79.01 ANTICOAGULANT LONG-TERM USE: Status: ACTIVE | Noted: 2025-02-19

## 2025-02-19 PROCEDURE — 45385 COLONOSCOPY W/LESION REMOVAL: CPT | Performed by: INTERNAL MEDICINE

## 2025-02-19 RX ORDER — FENTANYL CITRATE 50 UG/ML
INJECTION, SOLUTION INTRAMUSCULAR; INTRAVENOUS AS NEEDED
Status: COMPLETED | OUTPATIENT
Start: 2025-02-19 | End: 2025-02-19

## 2025-02-19 RX ORDER — MIDAZOLAM HYDROCHLORIDE 1 MG/ML
INJECTION, SOLUTION INTRAMUSCULAR; INTRAVENOUS AS NEEDED
Status: COMPLETED | OUTPATIENT
Start: 2025-02-19 | End: 2025-02-19

## 2025-02-19 RX ADMIN — FENTANYL CITRATE 50 MCG: 50 INJECTION, SOLUTION INTRAMUSCULAR; INTRAVENOUS at 09:34

## 2025-02-19 RX ADMIN — FENTANYL CITRATE 50 MCG: 50 INJECTION, SOLUTION INTRAMUSCULAR; INTRAVENOUS at 09:30

## 2025-02-19 RX ADMIN — MIDAZOLAM HYDROCHLORIDE 2 MG: 1 INJECTION, SOLUTION INTRAMUSCULAR; INTRAVENOUS at 09:34

## 2025-02-19 RX ADMIN — MIDAZOLAM HYDROCHLORIDE 2 MG: 1 INJECTION, SOLUTION INTRAMUSCULAR; INTRAVENOUS at 09:31

## 2025-02-19 ASSESSMENT — PAIN SCALES - GENERAL
PAINLEVEL_OUTOF10: 0 - NO PAIN

## 2025-02-19 ASSESSMENT — PAIN - FUNCTIONAL ASSESSMENT
PAIN_FUNCTIONAL_ASSESSMENT: 0-10

## 2025-02-19 NOTE — DISCHARGE INSTRUCTIONS

## 2025-02-19 NOTE — H&P
Procedure H&P    Patient Profile-Procedures  Name Zeny Scott  Date of Birth 1955  MRN [id number]  Address   [address] [facility] [id number] [facility] [address]    Primary Phone Number [phone]  Secondary Phone Number    Javier Cintron    Procedure(s):  Procedures: Colonoscopy  Primary contact name and number   Extended Emergency Contact Information  Primary Emergency Contact: Tari Hayes  Home Phone: [phone]  Relation: Child  Secondary Emergency Contact: NENADUANE  Home Phone: [phone]  Work Phone: [phone]  Mobile Phone: [phone]  Relation: Daughter    General Health  Weight There were no vitals filed for this visit.  BMI There is no height or weight on file to calculate BMI.    Allergies  Allergies   Allergen Reactions    Oxycodone Shortness of breath    Oxycodone-Acetaminophen Shortness of breath    Cyclobenzaprine Unknown     Other Reaction(s): Drowsy    Nickel Unknown       Past Medical History   Past Medical History:   Diagnosis Date    Actinic keratosis     Basal cell carcinoma     Cataract     Chronic atrial fibrillation (Multi) 03/07/2023    Diabetes mellitus (Multi)     Hyperlipidemia     Hypertension     Personal history of other diseases of the circulatory system     History of atrial fibrillation    Personal history of other diseases of the circulatory system     History of atrial fibrillation    Personal history of other endocrine, nutritional and metabolic disease     History of type 2 diabetes mellitus    Personal history of other endocrine, nutritional and metabolic disease     History of hyperthyroidism    Personal history of other endocrine, nutritional and metabolic disease     History of hyperparathyroidism    Personal history of other endocrine, nutritional and metabolic disease     History of diabetes mellitus    Personal history of other infectious and parasitic diseases     History of varicella    Personal history of other  specified conditions 10/30/2019    History of shortness of breath    Shortness of breath     Shortness of breath on exertion       Provider assessment  Diagnosis:  rectal bleedinbg  Medication Reviewed - yes  Prior to Admission medications    Medication Sig Start Date End Date Taking? Authorizing Provider   atorvastatin (Lipitor) 40 mg tablet Take by mouth. 2/11/22  Yes Historical Provider, MD   clotrimazole (Lotrimin) 1 % external solution Instill 4 drops into the left ear twice daily for the next 10 days. 12/11/24  Yes JOEL Enamorado   Farxiga 10 mg TAKE ONE TABLET BY MOUTH EVERY DAY 12/16/24  Yes Kirk Tee MD   fluticasone (Flonase) 50 mcg/actuation nasal spray Administer 2 sprays into each nostril, once daily. 12/11/24  Yes JOEL Enamorado   glimepiride (Amaryl) 4 mg tablet Take 1 tablet (4 mg) by mouth once daily in the morning. Take before meals.   Yes Historical Provider, MD   LORazepam (Ativan) 1 mg tablet Take 1 tablet (1 mg) by mouth every 6 hours if needed. 1/24/20  Yes Historical Provider, MD   methIMAzole (Tapazole) 5 mg tablet Take 1 tablet (5 mg) by mouth once daily. 9/30/24  Yes Historical Provider, MD   metoprolol tartrate (Lopressor) 25 mg tablet Take 1 tablet (25 mg) by mouth 2 times a day.   Yes Historical Provider, MD   omeprazole (PriLOSEC) 40 mg DR capsule Take 1 capsule (40 mg) by mouth once daily. 11/1/24  Yes Josep Melton MD   spironolactone (Aldactone) 25 mg tablet Take 1 tablet (25 mg) by mouth once daily. 10/14/24 10/14/25 Yes Kirk Tee MD   triamcinolone (Kenalog) 0.025 % ointment Apply topically 2 times a day. 9/4/24  Yes Missy Orozco DPM   triamcinolone (Kenalog) 0.1 % cream Apply topically 2 times a day for 14 days, then reduce frequency to twice a week.  Repeat as needed, but do not use more than 2 weeks per month. 10/2/24  Yes Liliana Martínez, DO   amLODIPine (Norvasc) 5 mg tablet Take 1 tablet (5 mg) by mouth once daily. 10/26/23 10/25/24   Kirk Tee MD   Eliquis 5 mg tablet TAKE 1 TABLET BY MOUTH TWICE A DAY 11/20/24   Kirk Tee MD   FreeStyle Main 2 [place] misc use as directed to monitor blood glucose continuously 6/28/23   Historical Provider, MD   FreeStyle Main 2 Sensor kit use as directed and change every 14 days to monitor blood glucose continuously. use reader to scan at least 3 times per day 9/19/23   Historical Provider, MD   FreeStyle Precision Maninder Strips strip use as directed to monitor blood glucose three times daily    Historical Provider, MD   gabapentin (Neurontin) 100 mg capsule Take 2 capsules (200 mg) by mouth once daily at bedtime. 9/30/24 1/28/25  Yara Najera, DO   ketoconazole (NIZOral) 2 % cream Apply topically 2 times a day. 10/2/24   Liliana Martínez, DO   ketoconazole (NIZOral) 2 % shampoo Apply topically every other day. 10/2/24   Liliana Martínez, DO   nitroglycerin (Nitrostat) 0.4 mg SL tablet Place 1 tablet (0.4 mg) under the tongue every 5 minutes if needed for chest pain. 11/22/19   Historical Provider, MD   amoxicillin-pot clavulanate (Augmentin) 400-57 mg/5 mL suspension Take 10 mL twice daily for the next 7 days  Patient not taking: Reported on 2/5/2025 1/20/25 2/19/25  Vashti Walker APRN-CNP   losartan (Cozaar) 50 mg tablet Take 1 tablet (50 mg) by mouth early in the morning..  Patient not taking: Reported on 2/5/2025 9/25/24 2/19/25  Historical Provider, MD   sod sulf-pot chloride-mag sulf (Sutab) 1.479-0.188- 0.225 gram tablet tablet Take 12 tablets by mouth 2 times a day.  Patient not taking: Reported on 2/5/2025 1/22/25 2/19/25  Josep Melton MD   sodium,potassium,mag sulfates (Suprep) 17.5-3.13-1.6 gram solution Take 1 bottle by mouth 2 times a day. 1/21/25 2/19/25  Josep Melton MD       Physical Exam  There were no vitals filed for this visit.     General: A&Ox3, NAD.  HEENT: AT/NC.   CV: RRR. No murmur.  Resp: CTA bilaterally. No wheezing, rhonchi or rales.   GI: Soft, NT/ND. BSx4.  Extrem:  No edema. Pulses intact.  Neuro: No focal deficits.   Psych: Normal mood and affect.      Procedure Plan - pre-procedural (re)assesment completed by physician:  discharge/transfer patient when discharge criteria met  Moderate sedation     ASA status 2  Mallampati score 2    Josep Melton MD  2/19/2025 9:29 AM

## 2025-02-19 NOTE — PROGRESS NOTES
OCCUPATIONAL THERAPY TREATMENT NOTE    Patient Name:  Zeny Scott   Patient MRN: [id number]  Date: 2/21/2025  Time Calculation  Start Time: 1130  Stop Time: 1215  Time Calculation (min): 45 min    Insurance:  Visit number: 10 of 12  Insurance Type: Payor: [employer] / Plan: [employer] / Product Type: *No Product type* /   Authorization or Plan of Care date Range:  C9 OT 12V 1/6-3/6/25 DIAG S52.234A   Copay: NA  Referred by: Leonides Parkinson PA        OT Therapeutic Procedures Time Entry  Manual Therapy Time Entry: 15  Therapeutic Exercise Time Entry: 15  OT Modalities Time Entry  Whirlpool Time Entry: 15                General:  Reason for visit: R nondisplaced fx of ulna shaft  Referred by: Leonides Parkinson PA      Current Problem:         Problem List Items Addressed This Visit             ICD-10-CM    Closed nondisplaced oblique fracture of shaft of right ulna - Primary S52.234A     Other Visit Diagnoses         Codes    Nondisplaced oblique fracture of shaft of right ulna, initial encounter for closed fracture     S52.234A            Assessment:  Progress towards functional goals: Improved mobility in right wrist and digits, Improve  and pinch strength, Improved fine motor coordination and manual dexterity skills, and Improved performance in daily activities   Response to interventions:  Objective measurements were re-assessed indicating increased AROM and gross muscle strength in R elbow and wrist. Stiffness remains in digits, affecting patient's ability to make a tight fist. Mild weakness noted in elbow flex/ext and wrist ext MMT. Increased  and pinch strength noted. Improved QuickDASH score indicates increased participation in ADLs and IADLs. Patient has achieved 3/6 since initial eval. Patient's pain level limits ability to strengthen RUE. Patient would benefit from additional OT sessions for pain  reduction, RUE strengthening, and digit ROM. Patient will request a new C9 at her orthopedic follow up on 25. Reduced stiffness noted in elbow and wrist during manual stretches. Patient notes only mild pain with forearm sup/pro manual stretches.   Justification for continued skilled care: To address remaining functional, objective and subjective deficits to allow the patient to return to full independence with ADLs. Skilled intervention required to improve ROM which will improve function. Progressive strengthening to stabilize the right wrist and hand to improve function. Assess effectiveness of HEP. Modify and progress home exercise program. Reduce pain to improve function.    Plan:  Progress exercises as tolerated to improve overall UE strength and performance in daily activities , Therapeutic exercises to strengthen RUE for functional use in daily activities. , Exercises to gradually increase range of motion., Manual therapy to  muscle tightness and improve joint mobility. , and Modalities as needed to address symptoms.  Orthopedic follow up scheduled on 3/3/25    Subjective:  Patient reports she has severe pain in the R wrist because she was shoveling snow yesterday.      Progress towards functional goal:  Improved mobility in right wrist and Improved performance in daily activities   Pain Location right wrist  Pain (0-10): 8   HEP adherence / understanding: compliance with the instructed home exercises.    Precautions:  Fall Risk: Moderate    Precautions listed:  5# lifting restriction     Therapy diagnoses:   1. Closed nondisplaced oblique fracture of shaft of right ulna, initial encounter        2. Nondisplaced oblique fracture of shaft of right ulna, initial encounter for closed fracture  Follow Up In Occupational Therapy          Objective: measured 1/10/25, 2/3/25, 25  Active range of motion:  R Elbow:  - Flexion: 130 (+10)  - Extension: -3 (no change)  - Supination: 85 (+30)  -  Pronation: 90 (+10)     R Wrist:  - Flexion: 50 (+10)  - Extension: 55 (+5)  - Rd dev: 15 (no change)  - Ul dev: 20 (-5)     Strength (MMT):  R Elbow:   - Flexion: 4+/5 (improvement)  - Extension: 4+/5 (improvement)  - Supination: 5/5 (improvement)  - Pronation: 5/5 (no change)     R Wrist:  - Flexion: 5/5 (no change)  - Extension: 4+/5 (improvement)  - Rd Dev. 5/5 (improvement)  - Ul Dev. 5/5 (no change)     strength:  - RUE : 15# (+8#)  - LUE : 45# (+11#)     Pinch Strength:  - Lateral:       - RUE: 5# (+2#)       - LUE: 10# (+2#)  - 3 Jaw:       - RUE: 4# (+1#)       - LUE: 10# (+1#)     Outcome Measures:  OT Adult Other Outcome Measures:   9 Hole Peg Test: R: 23.3 secs (9.4 secs improvement)      L: 17.7 secs (2.5 secs improvement)   OT Adult Other Outcome Measures  Other Outcome Measures: 36 (14 point improvement) (QuickDASH)  (61.36%)        Goals:  Patient will present with a pain score of 6/10 in R forearm during activities. Progressing     Patient to increase R forearm supination AROM by 15 degrees in order to improve independent performance in daily activities. Met     Patient to increase R elbow extension strength by scoring a 4/5 on the MMT to improve performance in  and personal hygiene tasks. Met     Patient will improve R  strength by 10lb to improve performance in lifting and grasping tasks. Progressing      Patient to improve R hand coordination on 9 hole peg test to <30 secs for dexterity tasks such as writing, typing, zipping, and buttoning. Met     Patient to improve QuickDASH score to at or below 40% to increase independency in ADLs and IADLs. Progressing     New Goals as of 2/21/25:  Patient will present with a pain score of 6/10 in R forearm during activities.    Patient to increase R wrist extension strength by scoring a 5/5 on the MMT to improve performance in ADLs and IADLs.    Patient will improve R  strength by 10lb to improve performance in lifting and  "grasping tasks.    Patient to improve QuickDASH score to at or below 40% to increase independency in ADLs and IADLs.    Treatment Performed: (\"NP\" = Not Performed)     Therapeutic Exercise: 15 mins  - Re-assessed objective measurements  - Discussed goal achievement and POC  - Reviewed HEP exercises     Therapeutic Activities: NP    Manual Therapy: 15 min  - joint traction and manual stretches to elbow and wrist in all planes     Neuromuscular Re-Education:   -   Modalities: 15 min  - Fluidotherapy with AROM right hand- 15 mins    Education: Reviewed home exercise program.   "

## 2025-02-21 ENCOUNTER — TREATMENT (OUTPATIENT)
Dept: OCCUPATIONAL THERAPY | Facility: CLINIC | Age: 70
End: 2025-02-21
Payer: COMMERCIAL

## 2025-02-21 DIAGNOSIS — S52.234A CLOSED NONDISPLACED OBLIQUE FRACTURE OF SHAFT OF RIGHT ULNA, INITIAL ENCOUNTER: Primary | ICD-10-CM

## 2025-02-21 DIAGNOSIS — S52.234A: ICD-10-CM

## 2025-02-21 PROCEDURE — 97022 WHIRLPOOL THERAPY: CPT | Mod: GO

## 2025-02-21 PROCEDURE — 97140 MANUAL THERAPY 1/> REGIONS: CPT | Mod: GO

## 2025-02-21 PROCEDURE — 97110 THERAPEUTIC EXERCISES: CPT | Mod: GO

## 2025-02-21 ASSESSMENT — PAIN - FUNCTIONAL ASSESSMENT: PAIN_FUNCTIONAL_ASSESSMENT: 0-10

## 2025-02-21 ASSESSMENT — PAIN SCALES - GENERAL: PAINLEVEL_OUTOF10: 8

## 2025-02-25 ENCOUNTER — HOSPITAL ENCOUNTER (OUTPATIENT)
Dept: RADIOLOGY | Facility: CLINIC | Age: 70
Discharge: HOME | End: 2025-02-25
Payer: MEDICARE

## 2025-02-25 ENCOUNTER — HOSPITAL ENCOUNTER (OUTPATIENT)
Dept: RADIOLOGY | Facility: HOSPITAL | Age: 70
Discharge: HOME | End: 2025-02-25
Payer: MEDICARE

## 2025-02-25 DIAGNOSIS — G89.29 CHRONIC PAIN OF LEFT KNEE: ICD-10-CM

## 2025-02-25 DIAGNOSIS — M25.562 CHRONIC PAIN OF LEFT KNEE: ICD-10-CM

## 2025-02-25 DIAGNOSIS — R29.6 FREQUENT FALLS: ICD-10-CM

## 2025-02-25 DIAGNOSIS — R42 DIZZINESS: ICD-10-CM

## 2025-02-25 PROCEDURE — 73564 X-RAY EXAM KNEE 4 OR MORE: CPT | Mod: LT

## 2025-02-25 PROCEDURE — 70470 CT HEAD/BRAIN W/O & W/DYE: CPT

## 2025-02-25 PROCEDURE — 2550000001 HC RX 255 CONTRASTS: Performed by: INTERNAL MEDICINE

## 2025-02-25 RX ADMIN — IOHEXOL 75 ML: 350 INJECTION, SOLUTION INTRAVENOUS at 13:20

## 2025-02-28 ENCOUNTER — APPOINTMENT (OUTPATIENT)
Dept: OPHTHALMOLOGY | Facility: CLINIC | Age: 70
End: 2025-02-28
Payer: COMMERCIAL

## 2025-02-28 DIAGNOSIS — H25.813 COMBINED FORMS OF AGE-RELATED CATARACT OF BOTH EYES: ICD-10-CM

## 2025-02-28 DIAGNOSIS — H47.392 OPTIC DISC HEMORRHAGE, LEFT: Primary | ICD-10-CM

## 2025-02-28 LAB
AVERAGE RNFL BASELINE (OD): 79
AVERAGE RNFL BASELINE (OS): 74

## 2025-02-28 PROCEDURE — 99214 OFFICE O/P EST MOD 30 MIN: CPT | Performed by: OPTOMETRIST

## 2025-02-28 PROCEDURE — 92133 CPTRZD OPH DX IMG PST SGM ON: CPT | Performed by: OPTOMETRIST

## 2025-02-28 ASSESSMENT — REFRACTION_MANIFEST
OS_CYLINDER: -1.50
OS_ADD: +2.50
OD_SPHERE: +1.50
OD_AXIS: 080
OS_SPHERE: +1.75
OD_CYLINDER: -1.00
OD_ADD: +2.50
OS_AXIS: 090

## 2025-02-28 ASSESSMENT — CONF VISUAL FIELD
OD_INFERIOR_NASAL_RESTRICTION: 0
OS_SUPERIOR_TEMPORAL_RESTRICTION: 0
OS_INFERIOR_NASAL_RESTRICTION: 0
OD_INFERIOR_TEMPORAL_RESTRICTION: 0
OD_SUPERIOR_TEMPORAL_RESTRICTION: 0
OD_SUPERIOR_NASAL_RESTRICTION: 0
OS_NORMAL: 1
OS_INFERIOR_TEMPORAL_RESTRICTION: 0
OD_NORMAL: 1
OS_SUPERIOR_NASAL_RESTRICTION: 0
METHOD: COUNTING FINGERS

## 2025-02-28 ASSESSMENT — EXTERNAL EXAM - LEFT EYE: OS_EXAM: NORMAL

## 2025-02-28 ASSESSMENT — ENCOUNTER SYMPTOMS
MUSCULOSKELETAL NEGATIVE: 0
GASTROINTESTINAL NEGATIVE: 0
CONSTITUTIONAL NEGATIVE: 0
ALLERGIC/IMMUNOLOGIC NEGATIVE: 0
HEMATOLOGIC/LYMPHATIC NEGATIVE: 0
ENDOCRINE NEGATIVE: 0
RESPIRATORY NEGATIVE: 0
CARDIOVASCULAR NEGATIVE: 0
PSYCHIATRIC NEGATIVE: 0
NEUROLOGICAL NEGATIVE: 0
EYES NEGATIVE: 0

## 2025-02-28 ASSESSMENT — TONOMETRY
OS_IOP_MMHG: 11
OD_IOP_MMHG: 12
IOP_METHOD: GOLDMANN APPLANATION

## 2025-02-28 ASSESSMENT — CUP TO DISC RATIO
OD_RATIO: 0.25
OS_RATIO: 0.25

## 2025-02-28 ASSESSMENT — VISUAL ACUITY
OS_CC: 20/30
OD_CC: 20/60
METHOD: SNELLEN - LINEAR

## 2025-02-28 ASSESSMENT — SLIT LAMP EXAM - LIDS
COMMENTS: GOOD POSITION, DERMATOCHALASIS
COMMENTS: GOOD POSITION, DERMATOCHALASIS

## 2025-02-28 ASSESSMENT — EXTERNAL EXAM - RIGHT EYE: OD_EXAM: NORMAL

## 2025-02-28 NOTE — PROGRESS NOTES
Assessment/Plan   Diagnoses and all orders for this visit:  Optic disc hemorrhage, left  -     OCT, Optic Nerve - OU - Both Eyes  Resolved. No new hemes present. Low suspicion for glaucoma. Discussed suspect that this may have been due to apixaban use as well as concurrent cold (pt noted heavy coughing). Recommend monitor in October with full exam and OCT RNFL.     Combined form of age-related cataract, both eyes  Stable. Monitor in October w/ refraction and comprehensive exam.   Discussed option for BF/PAL to help with patient concern w/ current separate D & N glasses. Pt states she prefers SV glasses and accepts associated compromises.

## 2025-03-03 NOTE — PROGRESS NOTES
OCCUPATIONAL THERAPY TREATMENT NOTE    Patient Name:  Zeny Scott   Patient MRN: [id number]  Date: 3/5/2025  Time Calculation  Start Time: 0750  Stop Time: 0830  Time Calculation (min): 40 min    Insurance:  Visit number: 11 of 12  Insurance Type: Payor: [employer] / Plan: [employer] / Product Type: *No Product type* /   Authorization or Plan of Care date Range:  C9 OT 12V 1/6-3/6/25 DIAG S52.234A   Copay: NA  Referred by: Leonides Parkinson PA        OT Therapeutic Procedures Time Entry  Manual Therapy Time Entry: 25  OT Modalities Time Entry  [place]irlpool Time Entry: 15                General:  Reason for visit: R nondisplaced fx of ulna shaft  Referred by: Leonides Parkinson PA      Current Problem:         Problem List Items Addressed This Visit             ICD-10-CM    Closed nondisplaced oblique fracture of shaft of right ulna - Primary S52.234A     Other Visit Diagnoses         Codes    Nondisplaced oblique fracture of shaft of right ulna, initial encounter for closed fracture     S52.234A            Assessment:  Progress towards functional goals: Reduce pain level  Response to interventions:  Session focused on modalities and pain reduction to reduce forearm swelling and to not aggravate active ulnar fraction. No stiffness noted with wrist manual stretches. Therapist reviewed the benefits of going through with the ORIF surgery. Therapist does not recommend continuation of OT at this time d/t non-bony union of the ulna fracture. Therapist reviewed appropriate HEP exercises to complete. Patient will follow up with her orthopedic regarding her decision for surgery.   Justification for continued skilled care: Assess effectiveness of HEP. Reduce pain to improve function.    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 Orthopedic recommended a an ORIF ulnar non union with iliac crest bone graft sx to repair the fracture. Patient is unsure wether she would like to go through with the surgery. Zeny reports she continues to have swelling to her wrist and occasional pain to her forearm. Patient became tearful explaining her concerns with her R forearm and going through with surgery.       Progress towards functional goal:  Improved mobility in right wrist and Reduce pain level  Pain Location right wrist  Pain (0-10): 3   HEP adherence / understanding: compliance with the instructed home exercises.    Precautions:  Fall Risk: Moderate    Precautions listed:  5# lifting restriction     Therapy diagnoses:   1. Closed nondisplaced oblique fracture of shaft of right ulna, initial encounter        2. Nondisplaced oblique fracture of shaft of right ulna, initial encounter for closed fracture  Follow Up In Occupational Therapy          Objective: measured 1/10/25, 2/3/25, 2/21/25    Treatment Performed: (\"NP\" = Not Performed)     Therapeutic Exercise: NP    Therapeutic Activities: NP    Manual Therapy: 25 min  - joint traction and manual stretches to elbow and wrist in all planes   - Retrograde massage to R hand and forearm     Neuromuscular Re-Education:   -   Modalities: 15 min  - Fluidotherapy with AROM right hand- 15 mins    Education: Reviewed home exercise program.   "

## 2025-03-05 ENCOUNTER — APPOINTMENT (OUTPATIENT)
Dept: OCCUPATIONAL THERAPY | Facility: CLINIC | Age: 70
End: 2025-03-05
Payer: COMMERCIAL

## 2025-03-05 DIAGNOSIS — S52.234A CLOSED NONDISPLACED OBLIQUE FRACTURE OF SHAFT OF RIGHT ULNA, INITIAL ENCOUNTER: Primary | ICD-10-CM

## 2025-03-05 DIAGNOSIS — S52.234A: ICD-10-CM

## 2025-03-05 PROCEDURE — 97022 WHIRLPOOL THERAPY: CPT | Mod: GO

## 2025-03-05 PROCEDURE — 97140 MANUAL THERAPY 1/> REGIONS: CPT | Mod: GO

## 2025-03-11 ENCOUNTER — APPOINTMENT (OUTPATIENT)
Dept: CARDIOLOGY | Facility: CLINIC | Age: 70
End: 2025-03-11
Payer: MEDICARE

## 2025-03-11 NOTE — PROGRESS NOTES
Subjective   Zney Scott is a 69 y.o. female.    Chief Complaint:  No chief complaint on file.    HPI    Cardiac catheterization performed on 2022 demonstrated 30 to 40% left anterior descending coronary artery stenosis with a normal circumflex and a normal right coronary artery. Medical therapy was recommended.     Her diagnosis of coronary artery disease is also based on a positive calcium score of 676 consistent with the presence of extensive atherosclerotic coronary artery disease.     Her diagnosis of atrial fibrillation dates back to 2017.  She has had multiple cardioversions.     She has a history of cirrhosis of the liver. She's also had a cholecystectomy. Has a history of elevated liver enzymes on statin therapy.     She is . Has 5 children one of which .  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     Allergies to medications: Oxycodone cyclobenzaprine    Social history: Non-smoker and has never smoked.    Family history: Father had heart disease.  No other family history.    ROS    Current Outpatient Medications   Medication Sig Dispense Refill    amLODIPine (Norvasc) 5 mg tablet Take 1 tablet (5 mg) by mouth once daily. 90 tablet 3    atorvastatin (Lipitor) 40 mg tablet Take by mouth.      clotrimazole (Lotrimin) 1 % external solution Instill 4 drops into the left ear twice daily for the next 10 days. 30 mL 0    Eliquis 5 mg tablet TAKE 1 TABLET BY MOUTH TWICE A DAY 60 tablet 11    Farxiga 10 mg TAKE ONE TABLET BY MOUTH EVERY DAY 90 tablet 3    fluticasone (Flonase) 50 mcg/actuation nasal spray Administer 2 sprays into each nostril, once daily. 16 g 1    FreeStyle Main 2 [place] misc use as directed to monitor blood glucose continuously      FreeStyle Main 2 Sensor kit use as directed and change every 14 days to monitor blood glucose continuously. use reader to scan at least 3 times per day      FreeStyle Precision Maninder Strips  "strip use as directed to monitor blood glucose three times daily      gabapentin (Neurontin) 100 mg capsule Take 2 capsules (200 mg) by mouth once daily at bedtime. 60 capsule 3    glimepiride (Amaryl) 4 mg tablet Take 1 tablet (4 mg) by mouth once daily in the morning. Take before meals.      ketoconazole (NIZOral) 2 % cream Apply topically 2 times a day. 60 g 2    ketoconazole (NIZOral) 2 % shampoo Apply topically every other day. 120 mL 2    LORazepam (Ativan) 1 mg tablet Take 1 tablet (1 mg) by mouth every 6 hours if needed.      methIMAzole (Tapazole) 5 mg tablet Take 1 tablet (5 mg) by mouth once daily.      metoprolol tartrate (Lopressor) 25 mg tablet Take 1 tablet (25 mg) by mouth 2 times a day.      nitroglycerin (Nitrostat) 0.4 mg SL tablet Place 1 tablet (0.4 mg) under the tongue every 5 minutes if needed for chest pain.      omeprazole (PriLOSEC) 40 mg DR capsule Take 1 capsule (40 mg) by mouth once daily. 30 capsule 1    spironolactone (Aldactone) 25 mg tablet Take 1 tablet (25 mg) by mouth once daily. 30 tablet 11    triamcinolone (Kenalog) 0.025 % ointment Apply topically 2 times a day. 80 g 1    triamcinolone (Kenalog) 0.1 % cream Apply topically 2 times a day for 14 days, then reduce frequency to twice a week.  Repeat as needed, but do not use more than 2 weeks per month. 453.6 g 0     No current facility-administered medications for this visit.        Visit Vitals  OB Status Postmenopausal   Smoking Status Never        Objective     Physical Exam    Lab Review:   {Recent labs:23359::\"not applicable\"}    Assessment:    1.  "

## 2025-03-15 DIAGNOSIS — G60.3 IDIOPATHIC PROGRESSIVE NEUROPATHY: ICD-10-CM

## 2025-03-15 DIAGNOSIS — E11.42 DIABETIC POLYNEUROPATHY ASSOCIATED WITH TYPE 2 DIABETES MELLITUS (MULTI): ICD-10-CM

## 2025-03-17 RX ORDER — GABAPENTIN 100 MG/1
CAPSULE ORAL
Qty: 60 CAPSULE | Refills: 0 | Status: SHIPPED | OUTPATIENT
Start: 2025-03-17

## 2025-03-21 ENCOUNTER — APPOINTMENT (OUTPATIENT)
Dept: CARDIOLOGY | Facility: CLINIC | Age: 70
End: 2025-03-21
Payer: MEDICARE

## 2025-03-21 ENCOUNTER — TELEPHONE (OUTPATIENT)
Dept: GASTROENTEROLOGY | Facility: CLINIC | Age: 70
End: 2025-03-21
Payer: MEDICARE

## 2025-03-21 NOTE — PROGRESS NOTES
Subjective   Zeny Scott is a 70 y.o. female.    Chief Complaint:  No chief complaint on file.    HPI    Cardiac catheterization performed on 2022 demonstrated 30 to 40% left anterior descending coronary artery stenosis with a normal circumflex and a normal right coronary artery. Medical therapy was recommended.     Her diagnosis of coronary artery disease is also based on a positive calcium score of 676 consistent with the presence of extensive atherosclerotic coronary artery disease.     Her diagnosis of atrial fibrillation dates back to 2017.  She has had multiple cardioversions.     She has a history of cirrhosis of the liver. She's also had a cholecystectomy. Has a history of elevated liver enzymes on statin therapy.     She is . Has 5 children one of which .  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      Allergies  Medication    · oxycodone            · Percocet TABS    Family History  Mother    · Family history of Alzheimer's disease (V17.2) (Z82.0)  Father    · Family history of cardiac disorder (V17.49) (Z82.49)     Social History  Problems    · Never smoker     Review of Systems   Constitutional: Positive for malaise/fatigue.   Cardiovascular:  Positive for chest pain and dyspnea on exertion.   Musculoskeletal:  Positive for arthritis and joint pain.   All other systems reviewed and are negative.    ROS    Current Outpatient Medications   Medication Sig Dispense Refill    amLODIPine (Norvasc) 5 mg tablet Take 1 tablet (5 mg) by mouth once daily. 90 tablet 3    atorvastatin (Lipitor) 40 mg tablet Take by mouth.      clotrimazole (Lotrimin) 1 % external solution Instill 4 drops into the left ear twice daily for the next 10 days. 30 mL 0    Eliquis 5 mg tablet TAKE 1 TABLET BY MOUTH TWICE A DAY 60 tablet 11    Farxiga 10 mg TAKE ONE TABLET BY MOUTH EVERY DAY 90 tablet 3    fluticasone (Flonase) 50 mcg/actuation nasal spray  "Administer 2 sprays into each nostril, once daily. 16 g 1    FreeStyle Main 2 [place] misc use as directed to monitor blood glucose continuously      FreeStyle Main 2 Sensor kit use as directed and change every 14 days to monitor blood glucose continuously. use reader to scan at least 3 times per day      FreeStyle Precision Maninder Strips strip use as directed to monitor blood glucose three times daily      gabapentin (Neurontin) 100 mg capsule TAKE TWO CAPSULES BY MOUTH ONCE DAILY AT BEDTIME 60 capsule 0    glimepiride (Amaryl) 4 mg tablet Take 1 tablet (4 mg) by mouth once daily in the morning. Take before meals.      ketoconazole (NIZOral) 2 % cream Apply topically 2 times a day. 60 g 2    ketoconazole (NIZOral) 2 % shampoo Apply topically every other day. 120 mL 2    LORazepam (Ativan) 1 mg tablet Take 1 tablet (1 mg) by mouth every 6 hours if needed.      methIMAzole (Tapazole) 5 mg tablet Take 1 tablet (5 mg) by mouth once daily.      metoprolol tartrate (Lopressor) 25 mg tablet Take 1 tablet (25 mg) by mouth 2 times a day.      nitroglycerin (Nitrostat) 0.4 mg SL tablet Place 1 tablet (0.4 mg) under the tongue every 5 minutes if needed for chest pain.      omeprazole (PriLOSEC) 40 mg DR capsule Take 1 capsule (40 mg) by mouth once daily. 30 capsule 1    spironolactone (Aldactone) 25 mg tablet Take 1 tablet (25 mg) by mouth once daily. 30 tablet 11    triamcinolone (Kenalog) 0.025 % ointment Apply topically 2 times a day. 80 g 1    triamcinolone (Kenalog) 0.1 % cream Apply topically 2 times a day for 14 days, then reduce frequency to twice a week.  Repeat as needed, but do not use more than 2 weeks per month. 453.6 g 0     No current facility-administered medications for this visit.        Visit Vitals  OB Status Postmenopausal   Smoking Status Never        Objective     Physical Exam    Lab Review:   {Recent labs:44147::\"not applicable\"}    Assessment:    1.  "

## 2025-03-21 NOTE — TELEPHONE ENCOUNTER
Pt called and asked that somebody call her back today, she's been sick since last Thursday can't eat has diarrhea and abdominal pain and wants some medical advice

## 2025-03-21 NOTE — TELEPHONE ENCOUNTER
Spoke to pt, reccommended ER. Pt stated she didn't want to go, I stated Dr Melton was out of the office, and I reccommended that she go, pt verbalized understating.

## 2025-04-15 ENCOUNTER — OFFICE VISIT (OUTPATIENT)
Dept: ORTHOPEDIC SURGERY | Facility: CLINIC | Age: 70
End: 2025-04-15
Payer: MEDICARE

## 2025-04-15 VITALS — WEIGHT: 189 LBS | HEIGHT: 63 IN | BODY MASS INDEX: 33.49 KG/M2

## 2025-04-15 DIAGNOSIS — M17.0 ARTHRITIS OF BOTH KNEES: Primary | ICD-10-CM

## 2025-04-15 PROCEDURE — 99213 OFFICE O/P EST LOW 20 MIN: CPT | Performed by: ORTHOPAEDIC SURGERY

## 2025-04-15 PROCEDURE — 1160F RVW MEDS BY RX/DR IN RCRD: CPT | Performed by: ORTHOPAEDIC SURGERY

## 2025-04-15 PROCEDURE — 1036F TOBACCO NON-USER: CPT | Performed by: ORTHOPAEDIC SURGERY

## 2025-04-15 PROCEDURE — 3008F BODY MASS INDEX DOCD: CPT | Performed by: ORTHOPAEDIC SURGERY

## 2025-04-15 PROCEDURE — 1159F MED LIST DOCD IN RCRD: CPT | Performed by: ORTHOPAEDIC SURGERY

## 2025-04-15 NOTE — PROGRESS NOTES
70-year-old is seen with left knee pain.  She has been having persistent severe sharp shooting pain in the left knee.  Pain is worse with standing and walking and going up and down stairs and getting up and down from a chair in and out of a car.  Minimal relief with cortisone in the past.  Viscosupplementation helped her.  She is working on better blood glucose control and she has been taking her diuretic medication more regularly.    Pleasant and no acute distress. Walks with antalgic gait. Stands with varus alignment of the left knee and neutral on the right. Left knee range of motion is 5-110°. The knee is stable to varus and valgus stress Lachman and posterior drawer. There is a mild effusion. There is generalized tenderness. Right knee range of motion is 0-120°. There is no effusion. The knee is stable to varus and valgus stress Lachman and posterior drawer. Both lower extremities are well perfused the skin is intact and muscle tone is adequate.  Compared to her last visit there is improved appearance of the skin and pretibial edema on the left leg.    A discussion about arthritis was done.  Treatment options were reviewed.  Importance of good blood glucose control was discussed.  By her report her most recent hemoglobin A1c was 7.9.  She will perform an exercise program.  She can use Tylenol as needed.  Viscosupplementation injections will be repeated.

## 2025-04-18 DIAGNOSIS — M17.12 ARTHRITIS OF LEFT KNEE: ICD-10-CM

## 2025-04-22 ENCOUNTER — OFFICE VISIT (OUTPATIENT)
Dept: CARDIOLOGY | Facility: CLINIC | Age: 70
End: 2025-04-22
Payer: MEDICARE

## 2025-04-22 VITALS
WEIGHT: 189 LBS | BODY MASS INDEX: 33.49 KG/M2 | SYSTOLIC BLOOD PRESSURE: 130 MMHG | HEIGHT: 63 IN | HEART RATE: 68 BPM | OXYGEN SATURATION: 96 % | DIASTOLIC BLOOD PRESSURE: 72 MMHG

## 2025-04-22 DIAGNOSIS — R06.02 SHORTNESS OF BREATH: ICD-10-CM

## 2025-04-22 DIAGNOSIS — I25.119 CORONARY ARTERY DISEASE INVOLVING NATIVE CORONARY ARTERY OF NATIVE HEART WITH ANGINA PECTORIS: ICD-10-CM

## 2025-04-22 DIAGNOSIS — G47.33 OBSTRUCTIVE SLEEP APNEA: ICD-10-CM

## 2025-04-22 DIAGNOSIS — Z79.01 ANTICOAGULANT LONG-TERM USE: ICD-10-CM

## 2025-04-22 DIAGNOSIS — I10 PRIMARY HYPERTENSION: ICD-10-CM

## 2025-04-22 DIAGNOSIS — I50.32 CHRONIC DIASTOLIC (CONGESTIVE) HEART FAILURE: ICD-10-CM

## 2025-04-22 DIAGNOSIS — R06.83 SNORING: ICD-10-CM

## 2025-04-22 DIAGNOSIS — R53.82 CHRONIC FATIGUE: ICD-10-CM

## 2025-04-22 DIAGNOSIS — I48.20 CHRONIC ATRIAL FIBRILLATION (MULTI): Chronic | ICD-10-CM

## 2025-04-22 DIAGNOSIS — R60.0 LEG EDEMA: Primary | ICD-10-CM

## 2025-04-22 DIAGNOSIS — E78.5 HYPERLIPIDEMIA, UNSPECIFIED HYPERLIPIDEMIA TYPE: ICD-10-CM

## 2025-04-22 PROBLEM — R03.0 ELEVATED BLOOD-PRESSURE READING WITHOUT DIAGNOSIS OF HYPERTENSION: Status: RESOLVED | Noted: 2024-11-27 | Resolved: 2025-04-22

## 2025-04-22 PROCEDURE — 99214 OFFICE O/P EST MOD 30 MIN: CPT | Performed by: INTERNAL MEDICINE

## 2025-04-22 PROCEDURE — 3008F BODY MASS INDEX DOCD: CPT | Performed by: INTERNAL MEDICINE

## 2025-04-22 PROCEDURE — 1159F MED LIST DOCD IN RCRD: CPT | Performed by: INTERNAL MEDICINE

## 2025-04-22 PROCEDURE — 3078F DIAST BP <80 MM HG: CPT | Performed by: INTERNAL MEDICINE

## 2025-04-22 PROCEDURE — 1036F TOBACCO NON-USER: CPT | Performed by: INTERNAL MEDICINE

## 2025-04-22 PROCEDURE — 3075F SYST BP GE 130 - 139MM HG: CPT | Performed by: INTERNAL MEDICINE

## 2025-04-22 RX ORDER — AMLODIPINE BESYLATE 5 MG/1
5 TABLET ORAL DAILY
Qty: 90 TABLET | Refills: 3 | Status: SHIPPED | OUTPATIENT
Start: 2025-04-22 | End: 2026-04-22

## 2025-04-22 RX ORDER — APIXABAN 5 MG/1
5 TABLET, FILM COATED ORAL 2 TIMES DAILY
Qty: 60 TABLET | Refills: 11 | Status: SHIPPED | OUTPATIENT
Start: 2025-04-22

## 2025-04-22 RX ORDER — SPIRONOLACTONE 25 MG/1
25 TABLET ORAL DAILY
Qty: 30 TABLET | Refills: 11 | Status: SHIPPED | OUTPATIENT
Start: 2025-04-22 | End: 2026-04-22

## 2025-04-22 RX ORDER — ATORVASTATIN CALCIUM 40 MG/1
40 TABLET, FILM COATED ORAL DAILY
Qty: 90 TABLET | Refills: 3 | Status: SHIPPED | OUTPATIENT
Start: 2025-04-22 | End: 2026-04-22

## 2025-04-22 RX ORDER — METOPROLOL TARTRATE 25 MG/1
25 TABLET, FILM COATED ORAL 2 TIMES DAILY
Qty: 180 TABLET | Refills: 3 | Status: SHIPPED | OUTPATIENT
Start: 2025-04-22

## 2025-04-22 NOTE — PROGRESS NOTES
Subjective   Zeny Scott is a 70 y.o. female.    Chief Complaint:  Fatigue and generalized weakness.    HPI    Her chief complaint is fatigue poor energy levels and poor exercise tolerance.  No anginal symptoms.  Denies any pressure or heaviness in the chest.  Does note exertional dyspnea.  Denies palpitations.    Cardiac catheterization performed on 2022 demonstrated 30 to 40% left anterior descending coronary artery stenosis with a normal circumflex and a normal right coronary artery. Medical therapy was recommended.     Her diagnosis of coronary artery disease is also based on a positive calcium score of 676 consistent with the presence of extensive atherosclerotic coronary artery disease.     Her diagnosis of atrial fibrillation dates back to 2017.  She has had multiple cardioversions.     She has a history of cirrhosis of the liver. She's also had a cholecystectomy. Has a history of elevated liver enzymes on statin therapy.     She is . Has 5 children one of which .  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      Allergies  Medication    · oxycodone            · Percocet TABS  Family History  Mother    · Family history of Alzheimer's disease (V17.2) (Z82.0)  Father    · Family history of cardiac disorder (V17.49) (Z82.49)   · Family history of kidney disease (V18.69) (Z84.1)     Social History  Problems    · Never smoker     Review of Systems   Constitutional: Positive for malaise/fatigue.   Cardiovascular:  Positive for chest pain and dyspnea on exertion.   Musculoskeletal:  Positive for arthritis and joint pain.   All other systems reviewed and are negative.      Current Outpatient Medications   Medication Sig Dispense Refill    clotrimazole (Lotrimin) 1 % external solution Instill 4 drops into the left ear twice daily for the next 10 days. 30 mL 0    Farxiga 10 mg TAKE ONE TABLET BY MOUTH EVERY DAY 90 tablet 3    fluticasone  (Flonase) 50 mcg/actuation nasal spray Administer 2 sprays into each nostril, once daily. 16 g 1    FreeStyle Main 2 [place] misc use as directed to monitor blood glucose continuously      FreeStyle Main 2 Sensor kit use as directed and change every 14 days to monitor blood glucose continuously. use reader to scan at least 3 times per day      FreeStyle Precision Maninder Strips strip use as directed to monitor blood glucose three times daily      gabapentin (Neurontin) 100 mg capsule TAKE TWO CAPSULES BY MOUTH ONCE DAILY AT BEDTIME 60 capsule 0    glimepiride (Amaryl) 4 mg tablet Take 1 tablet (4 mg) by mouth once daily in the morning. Take before meals.      ketoconazole (NIZOral) 2 % cream Apply topically 2 times a day. 60 g 2    ketoconazole (NIZOral) 2 % shampoo Apply topically every other day. 120 mL 2    LORazepam (Ativan) 1 mg tablet Take 1 tablet (1 mg) by mouth every 6 hours if needed.      methIMAzole (Tapazole) 5 mg tablet Take 1 tablet (5 mg) by mouth once daily.      nitroglycerin (Nitrostat) 0.4 mg SL tablet Place 1 tablet (0.4 mg) under the tongue every 5 minutes if needed for chest pain.      omeprazole (PriLOSEC) 40 mg DR capsule Take 1 capsule (40 mg) by mouth once daily. 30 capsule 1    triamcinolone (Kenalog) 0.025 % ointment Apply topically 2 times a day. 80 g 1    amLODIPine (Norvasc) 5 mg tablet Take 1 tablet (5 mg) by mouth once daily. 90 tablet 3    atorvastatin (Lipitor) 40 mg tablet Take 1 tablet (40 mg) by mouth once daily. 90 tablet 3    Eliquis 5 mg tablet Take 1 tablet (5 mg) by mouth 2 times a day. 60 tablet 11    metoprolol tartrate (Lopressor) 25 mg tablet Take 1 tablet (25 mg) by mouth 2 times a day. 180 tablet 3    spironolactone (Aldactone) 25 mg tablet Take 1 tablet (25 mg) by mouth once daily. 30 tablet 11    triamcinolone (Kenalog) 0.1 % cream Apply topically 2 times a day for 14 days, then reduce frequency to twice a week.  Repeat as needed, but do not use more than 2 weeks per  "month. (Patient not taking: Reported on 4/22/2025) 453.6 g 0     No current facility-administered medications for this visit.        Visit Vitals  /72 (BP Location: Left arm)   Pulse 68   Ht 1.6 m (5' 3\")   Wt 85.7 kg (189 lb)   SpO2 96%   BMI 33.48 kg/m²   OB Status Postmenopausal   Smoking Status Never   BSA 1.95 m²        Objective     Constitutional:       Appearance: Not in distress.   Neck:      Vascular: JVD normal.   Pulmonary:      Breath sounds: Normal breath sounds.   Cardiovascular:      Normal rate. Irregularly irregular rhythm. Normal S1. Normal S2.       Murmurs: There is no murmur.      No gallop.    Pulses:     Intact distal pulses.   Edema:     Peripheral edema present.     Pretibial: bilateral 1+ edema of the pretibial area.  Abdominal:      General: There is no distension.      Palpations: Abdomen is soft.   Neurological:      Mental Status: Alert.         Lab Review:   Lab Results   Component Value Date     10/21/2024    K 3.6 10/21/2024     10/21/2024    CO2 28 10/21/2024    BUN 17 10/21/2024    CREATININE 0.41 (L) 10/21/2024    GLUCOSE 172 (H) 10/21/2024    CALCIUM 9.4 10/21/2024       Assessment:    1.  Atrial fibrillation.  Controlled ventricular response.  Also seen by the electrophysiology service.  [place] not to be a good candidate for a pulmonary vein isolation procedure.  She has had chronic atrial fibrillation for the past 9 years.    2.  Coronary artery disease.  No anginal symptoms.  This is based on a positive CT calcium score of 676.    3.  Hyperlipidemia.  Cholesterol 140, HDL 54, LDL 69.    4.  Probable obstructive sleep apnea.  Has signs and symptoms of obstructive sleep apnea including poor sleep patterns and loud snoring.  Will get home sleep study.  2.  Hypertension.  Blood pressures are normal.    3.  Coronary artery disease.  The patient's stress thallium study done in March 2024 showed no evidence of ischemia with a left ventricular ejection fraction of " 72%.    4.  Fatigue.  Probably multifactorial.  Not at all sure how much better she would feel if she were in sinus rhythm.    5.  Hypertension.  Blood pressures are adequately controlled.    Needs to get some blood work done.

## 2025-04-22 NOTE — PATIENT INSTRUCTIONS
We are going to arrange for you to have a home sleep study.    We need you to get some blood tests.    Get Vitamin D3 2000 units daily

## 2025-04-24 ENCOUNTER — OFFICE VISIT (OUTPATIENT)
Dept: OTOLARYNGOLOGY | Facility: CLINIC | Age: 70
End: 2025-04-24
Payer: MEDICARE

## 2025-04-24 VITALS
SYSTOLIC BLOOD PRESSURE: 136 MMHG | HEIGHT: 63 IN | BODY MASS INDEX: 34.02 KG/M2 | DIASTOLIC BLOOD PRESSURE: 82 MMHG | WEIGHT: 192 LBS | TEMPERATURE: 98.1 F | HEART RATE: 78 BPM

## 2025-04-24 DIAGNOSIS — H62.42 OTOMYCOSIS OF LEFT EAR: Primary | ICD-10-CM

## 2025-04-24 DIAGNOSIS — H93.8X2 SENSATION OF PLUGGED EAR ON LEFT SIDE: ICD-10-CM

## 2025-04-24 DIAGNOSIS — B36.9 OTOMYCOSIS OF LEFT EAR: Primary | ICD-10-CM

## 2025-04-24 PROCEDURE — 1159F MED LIST DOCD IN RCRD: CPT | Performed by: NURSE PRACTITIONER

## 2025-04-24 PROCEDURE — 3008F BODY MASS INDEX DOCD: CPT | Performed by: NURSE PRACTITIONER

## 2025-04-24 PROCEDURE — 99213 OFFICE O/P EST LOW 20 MIN: CPT | Performed by: NURSE PRACTITIONER

## 2025-04-24 PROCEDURE — 1036F TOBACCO NON-USER: CPT | Performed by: NURSE PRACTITIONER

## 2025-04-24 PROCEDURE — 3079F DIAST BP 80-89 MM HG: CPT | Performed by: NURSE PRACTITIONER

## 2025-04-24 PROCEDURE — 3075F SYST BP GE 130 - 139MM HG: CPT | Performed by: NURSE PRACTITIONER

## 2025-04-24 PROCEDURE — 1126F AMNT PAIN NOTED NONE PRSNT: CPT | Performed by: NURSE PRACTITIONER

## 2025-04-24 RX ORDER — HYALURONATE SODIUM 20 MG/2 ML
20 SYRINGE (ML) INTRAARTICULAR WEEKLY
Qty: 6 ML | Refills: 0 | Status: SHIPPED | OUTPATIENT
Start: 2025-04-24 | End: 2025-05-09

## 2025-04-24 ASSESSMENT — ENCOUNTER SYMPTOMS
DEPRESSION: 0
OCCASIONAL FEELINGS OF UNSTEADINESS: 0
LOSS OF SENSATION IN FEET: 0

## 2025-04-24 ASSESSMENT — PAIN SCALES - GENERAL: PAINLEVEL_OUTOF10: 0-NO PAIN

## 2025-04-24 NOTE — PROGRESS NOTES
Subjective   Patient ID: Zeny Scott is a 70 y.o. female who presents for No chief complaint on file..    HPI    INITIAL VISIT 9/18/2020:  Zeny Scott is a 65 year-old female here for evaluation of her ears. They have been bothering her for about 2 weeks. She has been on an oral antibiotic and some Ciprodex drops. She notices some hearing loss and it feels like she is in a tunnel. She is not having much pain or drainage. She also has some sinus issues including postnasal drainage and some nasal congestion. No throat pain or trouble swallowing. She doesn't use any saline sprays. No previous ear surgeries. Next     9/22/2020: Started on Clotrimazole drops for 14 days, better.      11/17/2020: Patient here for her ears. She is having hearing loss, left greater than right. She denies any pain or drainage. She feels that the tinnitus is getting worse. About 1 month ago she was in the hospital for COVID-19 and then pneumonia and her hearing has been off since then. She was using Flonase nasal spray throughout her hospital stay.     8/9/2021: Patient here for evaluation of her right ear. For 2 weeks, crusty drainage in the right ear associated with hearing loss. Went to ER, started on PCN VK and Cipro drops on 8/3, not improving. No pain. Buzzing in both ears, long time. Every once in awhile dizziness. Some right-sided facial pain.      10/21/2021: Patient here for right ear. She has a lot of ear pressure and then down her neck. She feels that the right side of her face is numb. She feels that the Clotrimazole makes her ears worse.      11/23/2021: Saw Dr. Thomas. He debrided the ear & started her on Clotrimazole and Betamethasone for 14 days and recommended f/u with me in 2 weeks.      12/15/2021: Patient here for her right ear. She used the Clotrimazole drops and thinks that it made her ear worse. It is very clogged, feels like it is worse. It can cause her to feel pressure in her face. She states she has never  used the compounded powder yet, but she now has it and plans on using this.      4/24/2023: Left ear itching bad for the last couple of months. The right ear is feeling clogged. No pain or drainage.      9/28/2023: Patient here for the left ear. Every once in awhile it starts draining and it is crusty. She also feels a lump that comes and goes. At night, it is itching so bad. She has sores in the left nostril, some bleeding when she cleans it out. She also had a low grade fever this morning.     12/12/2024: Patient here today for her left ear. It is clogged and itching.  Patient fell and broke her arm unfortunately.    1/20/2025: Patient following up for her left ear. She is still having drainage and she can't hear well out of the ear. She used the Clotrimazole drops, not any real improvement.     2/5/2025: Patient following up for her ear. She has been using the Clotrimazole ear drops. Has not used the compounded powder, wasn't able to pick it up. It's blocked and itching. No pain.     4/24/2025: Patient here for her ears. She feels the compounded powder hasn't helped. The left ear is still draining and it's waking her up at night because it's clogged. No ear pain.    Patient Active Problem List   Diagnosis    Chronic LLQ pain    Chronic hepatitis C with cirrhosis (Multi)    Chronic diarrhea of unknown origin    Chronic bilateral low back pain with right-sided sciatica    Combined form of age-related cataract, right eye    Elevated AFP    Coronary artery disease    Hemangioma of skin and subcutaneous tissue    GERD (gastroesophageal reflux disease)    Esophageal varices    Left adnexal tenderness    Irritable bowel syndrome with diarrhea    Hyperopia, bilateral    HTN (hypertension)    Hepatic encephalopathy    Leg edema    Neoplasm of uncertain behavior of skin    Melanocytic nevi of other parts of face    Melanocytic nevi of unspecified upper limb, including shoulder    Melanocytic nevi of unspecified lower limb,  including hip    Melanocytic nevi of trunk    Melanocytic nevi of scalp and neck    Otomycosis of right ear    Skin changes due to chronic exposure to nonionizing radiation, unspecified    Actinic keratosis    Shortness of breath    Stress incontinence, female    Snoring    Diabetes mellitus, type 2 (Multi)    Angioma    Benign neoplasm    Intertrigo    Lentigines    Onychodystrophy    Seborrheic dermatitis    Dermatitis    Chronic diastolic (congestive) heart failure    Ear itch    Primary osteoarthritis of both knees    Achilles tendinosis of right ankle    Combined form of age-related cataract, left eye    Dry eyes, bilateral    Astigmatism of both eyes    Presbyopia    RPE mottling of macula    Asteatosis cutis    Chronic atrial fibrillation (Multi)    Hepatic cirrhosis (Multi)    Tailor's bunion    Tinnitus of both ears    Uterine prolapse    Vulvovaginitis    Acute pancreatitis    Hyperthyroidism    Anxiety disorder    Class 1 obesity with body mass index (BMI) of 33.0 to 33.9 in adult    Subacute cough    Localized edema    Disease of liver    Major depression, single episode    Neck pain    Diabetic polyneuropathy (Multi)    History of skin cancer    Microalbuminuria    Goiter    High risk medication use    Closed nondisplaced oblique fracture of shaft of right ulna    Anticoagulant long-term use    Obstructive sleep apnea    Chronic fatigue     Past Surgical History:   Procedure Laterality Date     SECTION, CLASSIC  2015     Section    GALLBLADDER SURGERY  2015    Gallbladder Surgery    LITHOTRIPSY  2015    Renal Lithotripsy    OTHER SURGICAL HISTORY  2015    Cholecystotomy    OTHER SURGICAL HISTORY  2015    Ovarian Cystectomy     Review of Systems    All other systems have been reviewed and are negative for complaints except for those mentioned in history of present illness, past medical history and problem list.    Objective   Physical Exam    Right Ear:  External inspection of ear with no deformity, scars, or masses. EAC is clear.  TM is intact with no sign of infection, effusion, or retraction.  No perforation seen.     Left Ear: External inspection of ear with no deformity, scars, or masses. EAC is with mild fungal appearing drainage that was removed using the microscope and suction.  TM is intact with no sign of infection, effusion, or retraction. No define landmarks, it is red.  No perforation seen.     Assessment/Plan   Diagnoses and all orders for this visit:  Otomycosis of left ear  Sensation of plugged ear on left side    Ear cleaned. I placed Clotrimazole-Betamethasone  cream 1%/0.05% into the ear.  Due to the chronic/recurrent infection on this side, I recommend seeing Dr. Bowles for another opinion.     All questions answered to patient satisfaction.     JAYME Enamorado-CNP 04/24/25 2:06 PM

## 2025-04-28 LAB
ALBUMIN SERPL-MCNC: 4.3 G/DL (ref 3.6–5.1)
ALP SERPL-CCNC: 215 U/L (ref 37–153)
ALT SERPL-CCNC: 18 U/L (ref 6–29)
ANION GAP SERPL CALCULATED.4IONS-SCNC: 11 MMOL/L (CALC) (ref 7–17)
AST SERPL-CCNC: 19 U/L (ref 10–35)
BILIRUB SERPL-MCNC: 0.8 MG/DL (ref 0.2–1.2)
BUN SERPL-MCNC: 17 MG/DL (ref 7–25)
CALCIUM SERPL-MCNC: 9.1 MG/DL (ref 8.6–10.4)
CHLORIDE SERPL-SCNC: 104 MMOL/L (ref 98–110)
CHOLEST SERPL-MCNC: 143 MG/DL
CHOLEST/HDLC SERPL: 2.6 (CALC)
CO2 SERPL-SCNC: 24 MMOL/L (ref 20–32)
CREAT SERPL-MCNC: 0.48 MG/DL (ref 0.6–1)
EGFRCR SERPLBLD CKD-EPI 2021: 102 ML/MIN/1.73M2
GLUCOSE SERPL-MCNC: 162 MG/DL (ref 65–99)
HDLC SERPL-MCNC: 55 MG/DL
LDLC SERPL CALC-MCNC: 74 MG/DL (CALC)
NONHDLC SERPL-MCNC: 88 MG/DL (CALC)
POTASSIUM SERPL-SCNC: 4 MMOL/L (ref 3.5–5.3)
PROT SERPL-MCNC: 7.4 G/DL (ref 6.1–8.1)
SODIUM SERPL-SCNC: 139 MMOL/L (ref 135–146)
TRIGL SERPL-MCNC: 68 MG/DL
TSH SERPL-ACNC: <0.01 MIU/L (ref 0.4–4.5)

## 2025-04-29 ENCOUNTER — TELEPHONE (OUTPATIENT)
Dept: CARDIOLOGY | Facility: CLINIC | Age: 70
End: 2025-04-29
Payer: MEDICARE

## 2025-04-29 NOTE — TELEPHONE ENCOUNTER
----- Message from Kirk eTe sent at 4/29/2025  8:48 AM EDT -----  Thyroid tests are off.  Check with Endocrinology or Dr. Fisher.  ----- Message -----  From: Agatha KTM Advance Results In  Sent: 4/28/2025   9:50 PM EDT  To: Kirk Tee MD

## 2025-04-29 NOTE — TELEPHONE ENCOUNTER
Left detailed message for patient that Dr. Tee reviewed labs and thyroid lab is abnormal to follow up with her PCP or endocrinologist.

## 2025-04-29 NOTE — TELEPHONE ENCOUNTER
----- Message from Kirk Tee sent at 4/29/2025  8:48 AM EDT -----  Thyroid tests are off.  Check with Endocrinology or Dr. Fisher.  ----- Message -----  From: Agatha SwarmBuild Results In  Sent: 4/28/2025   9:50 PM EDT  To: Kirk Tee MD

## 2025-05-01 LAB
NON-UH HIE A/G RATIO: 0.9
NON-UH HIE ALB: 3.5 G/DL (ref 3.4–5)
NON-UH HIE ALK PHOS: 290 UNIT/L (ref 45–117)
NON-UH HIE BASO COUNT: 0.01 X1000 (ref 0–0.2)
NON-UH HIE BASOS %: 0.3 %
NON-UH HIE BILIRUBIN, TOTAL: 0.6 MG/DL (ref 0.3–1.2)
NON-UH HIE BUN/CREAT RATIO: 31.4
NON-UH HIE BUN: 22 MG/DL (ref 9–23)
NON-UH HIE CALCIUM: 9.7 MG/DL (ref 8.7–10.4)
NON-UH HIE CALCULATED OSMOLALITY: 287 MOSM/KG (ref 275–295)
NON-UH HIE CHLORIDE: 102 MMOL/L (ref 98–107)
NON-UH HIE CO2, VENOUS: 30 MMOL/L (ref 20–31)
NON-UH HIE CREATININE, URINE MG/DL: 76.6 MG/DL
NON-UH HIE CREATININE: 0.7 MG/DL (ref 0.5–0.8)
NON-UH HIE DIFF?: ABNORMAL
NON-UH HIE EOS COUNT: 0.08 X1000 (ref 0–0.5)
NON-UH HIE EOSIN %: 2 %
NON-UH HIE FREE T3: 4.3 PG/ML (ref 2.3–4.2)
NON-UH HIE FREE T4: 1.42 NG/DL (ref 0.89–1.76)
NON-UH HIE GFR AA: >60
NON-UH HIE GLOBULIN: 3.9 G/DL
NON-UH HIE GLOMERULAR FILTRATION RATE: >60 ML/MIN/1.73M?
NON-UH HIE GLUCOSE: 207 MG/DL (ref 74–106)
NON-UH HIE GOT: 20 UNIT/L (ref 15–37)
NON-UH HIE GPT: 17 UNIT/L (ref 10–49)
NON-UH HIE HCT: 44.2 % (ref 36–46)
NON-UH HIE HGB A1C: 7.5 %
NON-UH HIE HGB: 14.5 G/DL (ref 12–16)
NON-UH HIE INSTR WBC: 4.1
NON-UH HIE K: 4.1 MMOL/L (ref 3.5–5.1)
NON-UH HIE LYMPH %: 35.6 %
NON-UH HIE LYMPH COUNT: 1.46 X1000 (ref 1.2–4.8)
NON-UH HIE MCH: 26.5 PG (ref 27–34)
NON-UH HIE MCHC: 32.7 G/DL (ref 32–37)
NON-UH HIE MCV: 80.9 FL (ref 80–100)
NON-UH HIE MICROALBUMIN, URINE MG/L: 237 MG/L
NON-UH HIE MICROALBUMIN/CREATININE RATIO: 309 MG MALB/GM CREAT (ref 0–30)
NON-UH HIE MONO %: 7.2 %
NON-UH HIE MONO COUNT: 0.29 X1000 (ref 0.1–1)
NON-UH HIE MPV: 8.6 FL (ref 7.4–10.4)
NON-UH HIE NA: 139 MMOL/L (ref 135–145)
NON-UH HIE NEUTROPHIL %: 55 %
NON-UH HIE NEUTROPHIL COUNT (ANC): 2.25 X1000 (ref 1.4–8.8)
NON-UH HIE NUCLEATED RBC: 0 /100WBC
NON-UH HIE PLATELET: 183 X10 (ref 150–450)
NON-UH HIE RBC: 5.46 X10 (ref 4.2–5.4)
NON-UH HIE RDW: 15.1 % (ref 11.5–14.5)
NON-UH HIE TOTAL PROTEIN: 7.4 G/DL (ref 5.7–8.2)
NON-UH HIE TSH: <0.01 UIU/ML (ref 0.55–4.78)
NON-UH HIE WBC: 4.1 X10 (ref 4.5–11)

## 2025-05-03 LAB — NON-UH HIE TSH RECEPTOR ANTIBODY: 3.56 IU/L

## 2025-05-05 ENCOUNTER — OFFICE VISIT (OUTPATIENT)
Dept: OTOLARYNGOLOGY | Facility: CLINIC | Age: 70
End: 2025-05-05
Payer: MEDICARE

## 2025-05-05 VITALS
HEART RATE: 83 BPM | HEIGHT: 63 IN | SYSTOLIC BLOOD PRESSURE: 139 MMHG | DIASTOLIC BLOOD PRESSURE: 73 MMHG | RESPIRATION RATE: 16 BRPM | TEMPERATURE: 98.2 F | OXYGEN SATURATION: 97 % | WEIGHT: 193 LBS | BODY MASS INDEX: 34.2 KG/M2

## 2025-05-05 DIAGNOSIS — H92.12 OTORRHEA OF LEFT EAR: ICD-10-CM

## 2025-05-05 DIAGNOSIS — E11.69 TYPE 2 DIABETES MELLITUS WITH OTHER SPECIFIED COMPLICATION, UNSPECIFIED WHETHER LONG TERM INSULIN USE (MULTI): ICD-10-CM

## 2025-05-05 DIAGNOSIS — H61.302 ACQUIRED STENOSIS OF LEFT EXTERNAL EAR CANAL: Primary | Chronic | ICD-10-CM

## 2025-05-05 PROBLEM — B36.9 OTOMYCOSIS OF LEFT EAR: Status: ACTIVE | Noted: 2025-05-05

## 2025-05-05 PROBLEM — H62.42 OTOMYCOSIS OF LEFT EAR: Status: ACTIVE | Noted: 2025-05-05

## 2025-05-05 PROCEDURE — 99214 OFFICE O/P EST MOD 30 MIN: CPT | Performed by: OTOLARYNGOLOGY

## 2025-05-05 PROCEDURE — 1126F AMNT PAIN NOTED NONE PRSNT: CPT | Performed by: OTOLARYNGOLOGY

## 2025-05-05 PROCEDURE — 1159F MED LIST DOCD IN RCRD: CPT | Performed by: OTOLARYNGOLOGY

## 2025-05-05 PROCEDURE — 1036F TOBACCO NON-USER: CPT | Performed by: OTOLARYNGOLOGY

## 2025-05-05 PROCEDURE — 3075F SYST BP GE 130 - 139MM HG: CPT | Performed by: OTOLARYNGOLOGY

## 2025-05-05 PROCEDURE — 3078F DIAST BP <80 MM HG: CPT | Performed by: OTOLARYNGOLOGY

## 2025-05-05 PROCEDURE — 3008F BODY MASS INDEX DOCD: CPT | Performed by: OTOLARYNGOLOGY

## 2025-05-05 RX ORDER — CIPROFLOXACIN AND DEXAMETHASONE 3; 1 MG/ML; MG/ML
4 SUSPENSION/ DROPS AURICULAR (OTIC) WEEKLY
Qty: 7.5 ML | Refills: 2 | Status: SHIPPED | OUTPATIENT
Start: 2025-05-05 | End: 2025-05-12

## 2025-05-05 ASSESSMENT — ENCOUNTER SYMPTOMS
OCCASIONAL FEELINGS OF UNSTEADINESS: 0
DEPRESSION: 0
LOSS OF SENSATION IN FEET: 0

## 2025-05-05 ASSESSMENT — PATIENT HEALTH QUESTIONNAIRE - PHQ9
SUM OF ALL RESPONSES TO PHQ9 QUESTIONS 1 AND 2: 0
2. FEELING DOWN, DEPRESSED OR HOPELESS: NOT AT ALL
1. LITTLE INTEREST OR PLEASURE IN DOING THINGS: NOT AT ALL

## 2025-05-05 ASSESSMENT — PAIN SCALES - GENERAL: PAINLEVEL_OUTOF10: 0-NO PAIN

## 2025-05-05 NOTE — PROGRESS NOTES
Chief Complaint: ear drainage  Referred by:  Denise     HISTORY OF PRESENT ILLNESS:  This is a 69 yo female who presents today for ear drainage. Worse on the left.  Intermittent drainage for the last 5 years. Has had cipro drops, clotrimazole, and mastoid powder.  Notes from Vashti Walker reviewed. Patient does use q-tips. Also reviewed her diabetes control, which has not been the best (see labs below). No prior ear surgeries. Does not use hearing aids but says she has hearing loss. Remote history of ear infections as a child.  Soc - retired LPN at [employer]     has a past medical history of Actinic keratosis, Basal cell carcinoma, Cataract, Chronic atrial fibrillation (Multi) (2023), CTS (carpal tunnel syndrome), Diabetes mellitus (Multi), Disease of thyroid gland, Hyperlipidemia, Hypertension, Neuropathy in diabetes (Multi), Personal history of other diseases of the circulatory system, Personal history of other diseases of the circulatory system, Personal history of other endocrine, nutritional and metabolic disease, Personal history of other endocrine, nutritional and metabolic disease, Personal history of other endocrine, nutritional and metabolic disease, Personal history of other endocrine, nutritional and metabolic disease, Personal history of other infectious and parasitic diseases, Personal history of other specified conditions (10/30/2019), Shortness of breath, and Sleep apnea.   has a past surgical history that includes Other surgical history (2015); Other surgical history (2015);  section, classic (2015); Lithotripsy (2015); Gallbladder surgery (2015); Skin biopsy; and Mohs surgery.  Current Medications[1]  Review of patient's allergies indicates:  Allergies[2]  Social History and Family History: reviewed in the EMR  New patients fill out a 10-system review of systems survey that gets reviewed at their initial visit. Pertinent positives have been incorporated  "into the HPI.    PHYSICAL EXAM:   Vitals:    05/05/25 1123   BP: 139/73   Pulse: 83   Resp: 16   Temp: 36.8 °C (98.2 °F)   TempSrc: Temporal   SpO2: 97%   Weight: 87.5 kg (193 lb)   Height: 1.6 m (5' 3\")     The patient is well-developed, well-nourished and in no acute distress.    The patient is alert and oriented to time, person, and place with appropriate mood and affect.     EARS: Examination of the external ears was normal using visual inspection. Handheld otoscopy was inadequate to provide fine detail and depth perception necessary for a full diagnostic assessment of the tympanic membrane and middle ear space. The otologic microscope was utilized to improve visualization. Use of the otologic microscope facilitates cleaning of the EAC and three-dimensional, magnified visualization of the tympanic membrane and middle ear structures for diagnosis of observable pathology and anatomical variations. Otoscopic and/or microscopic evaluation reveals:        Right:  External auditory canal: patent   Tympanic Membrane: intact and normal   Middle ear: well aerated       Left: External auditory canal: medial canal fibrosis  Tympanic Membrane: inflamed   Middle ear: unable to assess    Eyes:  EOMI, vision grossly intact, no nystagmus   Neurologic:  CN 2-12 intact including normal facial movement and sensation     DATA REVIEWED:   AUDIOGRAMS   - no recent studies    IMAGING  CT Head - 2/2025   - reviewed; middle ears and mastoids well aerated     OTHER  Labs: HgbA1C: 9.3 (2023) - 7.7 (2024)    PROCEDURE:  -none    ASSESSMENT & PLAN:  Problem List Items Addressed This Visit          ENT    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 Stop EAC manipulation. Dry ear precautions. Reinforced need for better glucose/diabetes " control.  -start prophylactic Ciprodex - 4 drops weekly, ok to use more if drainage increases  -follow-up 1-month with audiogram    Angel Bowles M.D.     Otology/Neurotology   Department of Otolaryngology - Head and Neck Surgery  [facility]  Phone/Appointments: [phone]   Fax: [phone]   E-mail: [email or web address]         [1]   Current Outpatient Medications:     amLODIPine (Norvasc) 5 mg tablet, Take 1 tablet (5 mg) by mouth once daily., Disp: 90 tablet, Rfl: 3    atorvastatin (Lipitor) 40 mg tablet, Take 1 tablet (40 mg) by mouth once daily., Disp: 90 tablet, Rfl: 3    clotrimazole (Lotrimin) 1 % external solution, Instill 4 drops into the left ear twice daily for the next 10 days., Disp: 30 mL, Rfl: 0    Eliquis 5 mg tablet, Take 1 tablet (5 mg) by mouth 2 times a day., Disp: 60 tablet, Rfl: 11    Farxiga 10 mg, TAKE ONE TABLET BY MOUTH EVERY DAY, Disp: 90 tablet, Rfl: 3    fluticasone (Flonase) 50 mcg/actuation nasal spray, Administer 2 sprays into each nostril, once daily., Disp: 16 g, Rfl: 1    FreeStyle Main 2 [place] misc, use as directed to monitor blood glucose continuously, Disp: , Rfl:     FreeStyle Main 2 Sensor kit, use as directed and change every 14 days to monitor blood glucose continuously. use reader to scan at least 3 times per day, Disp: , Rfl:     FreeStyle Precision Maninder Strips strip, use as directed to monitor blood glucose three times daily, Disp: , Rfl:     gabapentin (Neurontin) 100 mg capsule, TAKE TWO CAPSULES BY MOUTH ONCE DAILY AT BEDTIME, Disp: 60 capsule, Rfl: 0    glimepiride (Amaryl) 4 mg tablet, Take 1 tablet (4 mg) by mouth once daily in the morning. Take before meals., Disp: , Rfl:     ketoconazole (NIZOral) 2 % cream, Apply topically 2 times a day., Disp: 60 g, Rfl: 2    ketoconazole (NIZOral) 2 % shampoo, Apply topically every other day., Disp: 120 mL, Rfl: 2    LORazepam (Ativan) 1 mg tablet,  Take 1 tablet (1 mg) by mouth every 6 hours if needed., Disp: , Rfl:     methIMAzole (Tapazole) 5 mg tablet, Take 1 tablet (5 mg) by mouth once daily., Disp: , Rfl:     metoprolol tartrate (Lopressor) 25 mg tablet, Take 1 tablet (25 mg) by mouth 2 times a day., Disp: 180 tablet, Rfl: 3    nitroglycerin (Nitrostat) 0.4 mg SL tablet, Place 1 tablet (0.4 mg) under the tongue every 5 minutes if needed for chest pain., Disp: , Rfl:     omeprazole (PriLOSEC) 40 mg DR capsule, Take 1 capsule (40 mg) by mouth once daily., Disp: 30 capsule, Rfl: 1    sodium hyaluronate (Euflexxa) 10 mg/mL(mw 2.4 -3.6 million) injection, Inject 2 mL (20 mg) into the joint 1 (one) time per week for 3 doses. Inject 20mg/2ml prefilled syringe into the left knee once weekly for three weeks; Qty: 1 series, 3 doses, Disp: 6 mL, Rfl: 0    spironolactone (Aldactone) 25 mg tablet, Take 1 tablet (25 mg) by mouth once daily., Disp: 30 tablet, Rfl: 11    triamcinolone (Kenalog) 0.025 % ointment, Apply topically 2 times a day., Disp: 80 g, Rfl: 1    triamcinolone (Kenalog) 0.1 % cream, Apply topically 2 times a day for 14 days, then reduce frequency to twice a week.  Repeat as needed, but do not use more than 2 weeks per month., Disp: 453.6 g, Rfl: 0  [2]   Allergies  Allergen Reactions    Oxycodone Shortness of breath    Oxycodone-Acetaminophen Shortness of breath    Cyclobenzaprine Unknown     Other Reaction(s): Drowsy    Nickel Unknown

## 2025-05-20 ENCOUNTER — CLINICAL SUPPORT (OUTPATIENT)
Dept: AUDIOLOGY | Facility: CLINIC | Age: 70
End: 2025-05-20
Payer: MEDICARE

## 2025-05-20 DIAGNOSIS — H90.A21 SENSORINEURAL HEARING LOSS (SNHL) OF RIGHT EAR WITH RESTRICTED HEARING OF LEFT EAR: ICD-10-CM

## 2025-05-20 DIAGNOSIS — H90.A32 MIXED CONDUCTIVE AND SENSORINEURAL HEARING LOSS OF LEFT EAR WITH RESTRICTED HEARING OF RIGHT EAR: Primary | ICD-10-CM

## 2025-05-20 PROCEDURE — 92567 TYMPANOMETRY: CPT | Performed by: AUDIOLOGIST

## 2025-05-20 PROCEDURE — 92557 COMPREHENSIVE HEARING TEST: CPT | Performed by: AUDIOLOGIST

## 2025-05-20 ASSESSMENT — PAIN SCALES - GENERAL: PAINLEVEL_OUTOF10: 2

## 2025-05-20 ASSESSMENT — PAIN - FUNCTIONAL ASSESSMENT: PAIN_FUNCTIONAL_ASSESSMENT: 0-10

## 2025-05-20 NOTE — PROGRESS NOTES
"AUDIOLOGY ADULT AUDIOMETRIC EVALUATION      Name:  Zeny Scott  :  1955  Age:  70 y.o.  Date of Evaluation:  2025    HISTORY  Reason for visit:  hearing loss; ear drainage  Ms. Scott is seen 2025 at the request of Angel Bowles M.D.  for an evaluation of hearing.    (Following up with Dr Bowles 2025)    Chief complaint:    - hearing loss    - experiencing left medial canal fibrosis;  - treated with ciprodex    Hearing loss:  hearing loss, unsure which side is worse  Tinnitus:   bilateral, since teen years; uses TV for sound therapy at night  Otitis Media: chronic earaches in childhood   Otologic surgical history:  denies   Dizziness/imbalance:  yes, occasional off-balance when first standing  Otalgia:  left; 2/10 today  Ear pressure/fullness:  left  History of excessive noise exposure:  yes, concerts  Other:  experiences diabetes, afib, hypertension, thyroid concerns     Hearing aid history:  none         EVALUATION  Please find audiogram in \"Media\" tab (Document Type:  Audiology Report) or included at the bottom of this note.    RESULTS   Otoscopic Evaluation: non-occlusive cerumen, debris  bilateraly     Immittance Measures (226 Hz probe tone):     Right ear:  Tympanometry is consistent with normal middle ear pressure and restricted tympanic membrane mobility.     Left ear: Tympanometry is consistent with an immobile middle ear system.     Test technique:  standard behavioral technique via insert earphones.  Reliability is good.    Pure Tone Audiometry:    Right ear:  Hearing sensitivity is in the normal hearing to moderately-severe hearing loss range.  Hearing loss is sensorineural  Left ear: Hearing sensitivity is in the mild to severe hearing loss range.   Hearing loss is mixed    Speech Audiometry:        Right Ear:  Speech Reception Threshold (SRT) was obtained at 20 dBHL                 Speech discrimination score was 100% in quiet when words were presented at 70 dBHL      Left " Ear:  Speech Reception Threshold (SRT) was obtained at 30 dBHL                 Speech discrimination score was 96% in quiet when words were presented at 80 dBHL    IMPRESSIONS:  Patient is expected to have communication difficulty in adverse listening environments.   Patient is expected to benefit from effective communication strategies and may benefit from devices that provide amplification and/or improve the desired sound signal over that of background noise.       RECOMMENDATIONS  Continue with otologic follow-up with Angel Bowles M.D.   Reassess hearing in 1 year (or sooner if medically indicated or if there is a concern for a change in hearing).    Consider a Hearing Aid Evaluation with an audiologist to discuss hearing technology (such as hearing aids) and services.    Continue with medical follow-up as indicated.      PATIENT EDUCATION  Discussed results and recommendations with patient.  Questions were addressed and the patient was encouraged to contact our department should concerns arise.       JAN Batista, [facility]  Licensed Audiologist

## 2025-05-22 ENCOUNTER — APPOINTMENT (OUTPATIENT)
Dept: ORTHOPEDIC SURGERY | Facility: CLINIC | Age: 70
End: 2025-05-22
Payer: MEDICARE

## 2025-05-27 ENCOUNTER — CLINICAL SUPPORT (OUTPATIENT)
Dept: SLEEP MEDICINE | Facility: CLINIC | Age: 70
End: 2025-05-27
Payer: MEDICARE

## 2025-05-27 DIAGNOSIS — R06.02 SHORTNESS OF BREATH: ICD-10-CM

## 2025-05-27 DIAGNOSIS — G47.33 OBSTRUCTIVE SLEEP APNEA: ICD-10-CM

## 2025-05-27 DIAGNOSIS — R06.83 SNORING: ICD-10-CM

## 2025-05-27 NOTE — PROGRESS NOTES
Type of Study: HOME SLEEP STUDY - NOMAD     The patient received equipment and instructions for use of the [facility] HSAT device. The patient was instructed how to apply the effort belts, cannula, thermistor. It was also explained how the Nomad and oximeter components work.  The patient was asked to record their sleep for an 8-hour period.     The patient was informed of their responsibility for the device and acknowledged this by signing the HSAT device contract. The patient was asked to return the device on 05/28/2025 between the hours of 06:30 am - 06:30 pm to the [facility].     The patient was instructed to call 911 as usual for any medical- emergencies while at home.  The patient was also given a phone number for troubleshooting when using the device in case there were additional questions.

## 2025-05-29 ENCOUNTER — OFFICE VISIT (OUTPATIENT)
Dept: ORTHOPEDIC SURGERY | Facility: CLINIC | Age: 70
End: 2025-05-29
Payer: MEDICARE

## 2025-05-29 VITALS — BODY MASS INDEX: 33.49 KG/M2 | WEIGHT: 189 LBS | HEIGHT: 63 IN

## 2025-05-29 DIAGNOSIS — M17.12 ARTHRITIS OF LEFT KNEE: Primary | ICD-10-CM

## 2025-05-29 PROCEDURE — 1036F TOBACCO NON-USER: CPT

## 2025-05-29 PROCEDURE — 3008F BODY MASS INDEX DOCD: CPT

## 2025-05-29 PROCEDURE — 20610 DRAIN/INJ JOINT/BURSA W/O US: CPT | Mod: LT

## 2025-05-29 PROCEDURE — 99211 OFF/OP EST MAY X REQ PHY/QHP: CPT | Mod: 25

## 2025-05-29 PROCEDURE — 1160F RVW MEDS BY RX/DR IN RCRD: CPT

## 2025-05-29 PROCEDURE — 1159F MED LIST DOCD IN RCRD: CPT

## 2025-05-29 PROCEDURE — 2500000004 HC RX 250 GENERAL PHARMACY W/ HCPCS (ALT 636 FOR OP/ED): Mod: JZ

## 2025-05-29 RX ADMIN — Medication 10 MG: at 10:35

## 2025-05-29 NOTE — PROGRESS NOTES
Subjective    Patient ID: Zeny Scott is a 70 y.o. female.    Chief Complaint: Injections of the Left Knee     L Inj/Asp: L knee on 5/29/2025 10:35 AM  Indications: pain  Details: 22 G needle, superolateral approach  Medications: 10 mg sodium hyaluronate 10 mg/mL(mw 2.4 -3.6 million)  Procedure, treatment alternatives, risks and benefits explained, specific risks discussed. Consent was given by the patient.          Assessment/Plan   Encounter Diagnoses:  Arthritis of left knee

## 2025-05-30 ENCOUNTER — PATIENT OUTREACH (OUTPATIENT)
Dept: CARE COORDINATION | Facility: CLINIC | Age: 70
End: 2025-05-30
Payer: MEDICARE

## 2025-05-30 NOTE — PROGRESS NOTES
Outreach to patient post ED visit.  Reviewed discharge instructions, medications and the need to follow up with their primary care doctor.  At this time there are no further questions, and no further outreach is needed.  Davie TORRESN, RN, [facility]  O: [phone]

## 2025-06-02 ENCOUNTER — OFFICE VISIT (OUTPATIENT)
Dept: OTOLARYNGOLOGY | Facility: CLINIC | Age: 70
End: 2025-06-02
Payer: MEDICARE

## 2025-06-02 VITALS
WEIGHT: 192 LBS | SYSTOLIC BLOOD PRESSURE: 152 MMHG | HEIGHT: 63 IN | TEMPERATURE: 98.3 F | HEART RATE: 62 BPM | OXYGEN SATURATION: 97 % | DIASTOLIC BLOOD PRESSURE: 88 MMHG | BODY MASS INDEX: 34.02 KG/M2

## 2025-06-02 DIAGNOSIS — B36.9 OTOMYCOSIS OF LEFT EAR: ICD-10-CM

## 2025-06-02 DIAGNOSIS — H61.302 ACQUIRED STENOSIS OF LEFT EXTERNAL EAR CANAL: ICD-10-CM

## 2025-06-02 DIAGNOSIS — L29.9 EAR ITCHING: ICD-10-CM

## 2025-06-02 DIAGNOSIS — H62.42 OTOMYCOSIS OF LEFT EAR: ICD-10-CM

## 2025-06-02 DIAGNOSIS — H92.12 OTORRHEA OF LEFT EAR: Primary | ICD-10-CM

## 2025-06-02 DIAGNOSIS — H93.8X2 SENSATION OF PLUGGED EAR ON LEFT SIDE: ICD-10-CM

## 2025-06-02 DIAGNOSIS — E11.69 TYPE 2 DIABETES MELLITUS WITH OTHER SPECIFIED COMPLICATION, UNSPECIFIED WHETHER LONG TERM INSULIN USE (MULTI): ICD-10-CM

## 2025-06-02 PROCEDURE — 1159F MED LIST DOCD IN RCRD: CPT | Performed by: OTOLARYNGOLOGY

## 2025-06-02 PROCEDURE — 3077F SYST BP >= 140 MM HG: CPT | Performed by: OTOLARYNGOLOGY

## 2025-06-02 PROCEDURE — 1126F AMNT PAIN NOTED NONE PRSNT: CPT | Performed by: OTOLARYNGOLOGY

## 2025-06-02 PROCEDURE — 99213 OFFICE O/P EST LOW 20 MIN: CPT | Performed by: OTOLARYNGOLOGY

## 2025-06-02 PROCEDURE — 3008F BODY MASS INDEX DOCD: CPT | Performed by: OTOLARYNGOLOGY

## 2025-06-02 PROCEDURE — 3079F DIAST BP 80-89 MM HG: CPT | Performed by: OTOLARYNGOLOGY

## 2025-06-02 ASSESSMENT — PATIENT HEALTH QUESTIONNAIRE - PHQ9
2. FEELING DOWN, DEPRESSED OR HOPELESS: SEVERAL DAYS
SUM OF ALL RESPONSES TO PHQ9 QUESTIONS 1 AND 2: 2
1. LITTLE INTEREST OR PLEASURE IN DOING THINGS: SEVERAL DAYS

## 2025-06-02 ASSESSMENT — PAIN SCALES - GENERAL: PAINLEVEL_OUTOF10: 0-NO PAIN

## 2025-06-02 ASSESSMENT — ENCOUNTER SYMPTOMS
LOSS OF SENSATION IN FEET: 1
OCCASIONAL FEELINGS OF UNSTEADINESS: 0
DEPRESSION: 1

## 2025-06-02 NOTE — PROGRESS NOTES
Chief Complaint: ear drainage  Referred by:  Denise     Interval: Patient follows up for medial canal fibrosis. Started on Ciprodex.  Using 4 drops weekly, plus applying some on a q-tip.   - Extensive left chronic otitis externa history.  Most recently has been on clotrimazole and steroid ointment.  Has also tried mastoid powder in the past, see media tab for scanned prescription.      HISTORY OF PRESENT ILLNESS:  This is a 69 yo female who presents today for ear drainage. Worse on the left.  Intermittent drainage for the last 5 years. Has had cipro drops, clotrimazole, and mastoid powder.  Notes from Vashti Walker reviewed. Patient does use q-tips. Also reviewed her diabetes control, which has not been the best (see labs below). No prior ear surgeries. Does not use hearing aids but says she has hearing loss. Remote history of ear infections as a child.  Soc - retired LPN at [employer]     has a past medical history of Actinic keratosis, Basal cell carcinoma, Cataract, Chronic atrial fibrillation (Multi) (2023), CTS (carpal tunnel syndrome), Diabetes mellitus (Multi), Disease of thyroid gland, Hyperlipidemia, Hypertension, Neuropathy in diabetes (Multi), Personal history of other diseases of the circulatory system, Personal history of other diseases of the circulatory system, Personal history of other endocrine, nutritional and metabolic disease, Personal history of other endocrine, nutritional and metabolic disease, Personal history of other endocrine, nutritional and metabolic disease, Personal history of other endocrine, nutritional and metabolic disease, Personal history of other infectious and parasitic diseases, Personal history of other specified conditions (10/30/2019), Shortness of breath, and Sleep apnea.   has a past surgical history that includes Other surgical history (2015); Other surgical history (2015);  section, classic (2015); Lithotripsy (2015); Gallbladder  surgery (11/06/2015); Skin biopsy; and Mohs surgery.  Current Medications[1]  Review of patient's allergies indicates:  Allergies[2]  Social History and Family History: reviewed in the EMR  New patients fill out a 10-system review of systems survey that gets reviewed at their initial visit. Pertinent positives have been incorporated into the HPI.    PHYSICAL EXAM:   There were no vitals filed for this visit.    The patient is well-developed, well-nourished and in no acute distress.    The patient is alert and oriented to time, person, and place with appropriate mood and affect.     EARS: Examination of the external ears was normal using visual inspection. Handheld otoscopy was inadequate to provide fine detail and depth perception necessary for a full diagnostic assessment of the tympanic membrane and middle ear space. The otologic microscope was utilized to improve visualization. Use of the otologic microscope facilitates cleaning of the EAC and three-dimensional, magnified visualization of the tympanic membrane and middle ear structures for diagnosis of observable pathology and anatomical variations. Otoscopic and/or microscopic evaluation reveals:        Right:  External auditory canal: patent   Tympanic Membrane: intact and normal   Middle ear: well aerated       Left: External auditory canal: medial canal fibrosis, mild fungal hyphae  Tympanic Membrane: inflamed   Middle ear: unable to assess    Eyes:  EOMI, vision grossly intact, no nystagmus   Neurologic:  CN 2-12 intact including normal facial movement and sensation     DATA REVIEWED:   AUDIOGRAMS   Date - 05/2025      IMAGING  CT Head - 2/2025   - reviewed; middle ears and mastoids well aerated     OTHER  Labs: HgbA1C: 9.3 (2023) - 7.7 (2024)    PROCEDURE:  -none    ASSESSMENT & PLAN:  Problem List Items Addressed This Visit          ENT    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 Stop EAC manipulation. Dry ear precautions. Reinforced need for better glucose/diabetes control.  - prophylactic Ciprodex - 4 drops weekly, ok to use more if drainage increases  -Discussed gentian violet, will try to get some for this office  - Discussed an alternative mastoid powder formulation, consider mastoid powder #6 with Mayelin insufflator    Audiogram reviewed.  -Recommend hearing aid evaluation    -Follow-up 5 to 6 months for reassessment, sooner if problems develop    Angel Bowles M.D.     Otology/Neurotology   Department of Otolaryngology - Head and Neck Surgery  [facility]  Phone/Appointments: [phone]   Fax: [phone]   E-mail: [email or web address]         [1]   Current Outpatient Medications:     amLODIPine (Norvasc) 5 mg tablet, Take 1 tablet (5 mg) by mouth once daily., Disp: 90 tablet, Rfl: 3    atorvastatin (Lipitor) 40 mg tablet, Take 1 tablet (40 mg) by mouth once daily., Disp: 90 tablet, Rfl: 3    clotrimazole (Lotrimin) 1 % external solution, Instill 4 drops into the left ear twice daily for the next 10 days., Disp: 30 mL, Rfl: 0    Eliquis 5 mg tablet, Take 1 tablet (5 mg) by mouth 2 times a day., Disp: 60 tablet, Rfl: 11    Farxiga 10 mg, TAKE ONE TABLET BY MOUTH EVERY DAY, Disp: 90 tablet, Rfl: 3    fluticasone (Flonase) 50 mcg/actuation nasal spray, Administer 2 sprays into each nostril, once daily., Disp: 16 g, Rfl: 1    FreeStyle Main 2 [place] misc, use as directed to monitor blood glucose continuously, Disp: , Rfl:     FreeStyle Main 2 Sensor kit, use as directed and change every 14 days to monitor blood glucose continuously. use reader to scan at least 3 times per day, Disp: , Rfl:      FreeStyle Precision Maninder Strips strip, use as directed to monitor blood glucose three times daily, Disp: , Rfl:     gabapentin (Neurontin) 100 mg capsule, TAKE TWO CAPSULES BY MOUTH ONCE DAILY AT BEDTIME, Disp: 60 capsule, Rfl: 0    glimepiride (Amaryl) 4 mg tablet, Take 1 tablet (4 mg) by mouth once daily in the morning. Take before meals., Disp: , Rfl:     ketoconazole (NIZOral) 2 % cream, Apply topically 2 times a day., Disp: 60 g, Rfl: 2    ketoconazole (NIZOral) 2 % shampoo, Apply topically every other day., Disp: 120 mL, Rfl: 2    LORazepam (Ativan) 1 mg tablet, Take 1 tablet (1 mg) by mouth every 6 hours if needed., Disp: , Rfl:     methIMAzole (Tapazole) 5 mg tablet, Take 1 tablet (5 mg) by mouth once daily., Disp: , Rfl:     metoprolol tartrate (Lopressor) 25 mg tablet, Take 1 tablet (25 mg) by mouth 2 times a day., Disp: 180 tablet, Rfl: 3    nitroglycerin (Nitrostat) 0.4 mg SL tablet, Place 1 tablet (0.4 mg) under the tongue every 5 minutes if needed for chest pain., Disp: , Rfl:     omeprazole (PriLOSEC) 40 mg DR capsule, Take 1 capsule (40 mg) by mouth once daily., Disp: 30 capsule, Rfl: 1    spironolactone (Aldactone) 25 mg tablet, Take 1 tablet (25 mg) by mouth once daily., Disp: 30 tablet, Rfl: 11    triamcinolone (Kenalog) 0.025 % ointment, Apply topically 2 times a day., Disp: 80 g, Rfl: 1    triamcinolone (Kenalog) 0.1 % cream, Apply topically 2 times a day for 14 days, then reduce frequency to twice a week.  Repeat as needed, but do not use more than 2 weeks per month., Disp: 453.6 g, Rfl: 0  [2]   Allergies  Allergen Reactions    Oxycodone Shortness of breath    Oxycodone-Acetaminophen Shortness of breath    Cyclobenzaprine Unknown     Other Reaction(s): Drowsy    Nickel Unknown

## 2025-06-03 ENCOUNTER — TELEPHONE (OUTPATIENT)
Dept: ORTHOPEDIC SURGERY | Facility: CLINIC | Age: 70
End: 2025-06-03
Payer: MEDICARE

## 2025-06-03 DIAGNOSIS — R11.0 NAUSEA: Primary | ICD-10-CM

## 2025-06-03 RX ORDER — ONDANSETRON 4 MG/1
4 TABLET, FILM COATED ORAL EVERY 8 HOURS PRN
Qty: 9 TABLET | Refills: 0 | Status: SHIPPED | OUTPATIENT
Start: 2025-06-03 | End: 2025-06-06

## 2025-06-03 NOTE — TELEPHONE ENCOUNTER
Patient called in asking to speak with you.     She stated that she is having an allergic reaction to the cortisone injection she had on 5/29/25, and needed to talk to you

## 2025-06-04 ENCOUNTER — OFFICE VISIT (OUTPATIENT)
Dept: CARDIOLOGY | Facility: CLINIC | Age: 70
End: 2025-06-04
Payer: MEDICARE

## 2025-06-04 VITALS
OXYGEN SATURATION: 96 % | HEART RATE: 84 BPM | WEIGHT: 192 LBS | BODY MASS INDEX: 34.01 KG/M2 | DIASTOLIC BLOOD PRESSURE: 78 MMHG | SYSTOLIC BLOOD PRESSURE: 122 MMHG

## 2025-06-04 DIAGNOSIS — R06.83 SNORING: ICD-10-CM

## 2025-06-04 DIAGNOSIS — R53.82 CHRONIC FATIGUE: ICD-10-CM

## 2025-06-04 DIAGNOSIS — I50.32 CHRONIC DIASTOLIC (CONGESTIVE) HEART FAILURE: ICD-10-CM

## 2025-06-04 DIAGNOSIS — R60.0 LEG EDEMA: ICD-10-CM

## 2025-06-04 DIAGNOSIS — I48.20 CHRONIC ATRIAL FIBRILLATION (MULTI): Primary | Chronic | ICD-10-CM

## 2025-06-04 DIAGNOSIS — G47.33 OBSTRUCTIVE SLEEP APNEA: ICD-10-CM

## 2025-06-04 DIAGNOSIS — I10 PRIMARY HYPERTENSION: ICD-10-CM

## 2025-06-04 DIAGNOSIS — I25.119 CORONARY ARTERY DISEASE INVOLVING NATIVE CORONARY ARTERY OF NATIVE HEART WITH ANGINA PECTORIS: ICD-10-CM

## 2025-06-04 PROCEDURE — 1036F TOBACCO NON-USER: CPT | Performed by: INTERNAL MEDICINE

## 2025-06-04 PROCEDURE — 99212 OFFICE O/P EST SF 10 MIN: CPT | Performed by: INTERNAL MEDICINE

## 2025-06-04 PROCEDURE — 99214 OFFICE O/P EST MOD 30 MIN: CPT | Performed by: INTERNAL MEDICINE

## 2025-06-04 PROCEDURE — 3078F DIAST BP <80 MM HG: CPT | Performed by: INTERNAL MEDICINE

## 2025-06-04 PROCEDURE — 3074F SYST BP LT 130 MM HG: CPT | Performed by: INTERNAL MEDICINE

## 2025-06-04 PROCEDURE — 1159F MED LIST DOCD IN RCRD: CPT | Performed by: INTERNAL MEDICINE

## 2025-06-04 RX ORDER — PANTOPRAZOLE SODIUM 40 MG/1
1 TABLET, DELAYED RELEASE ORAL
COMMUNITY
Start: 2025-05-23

## 2025-06-04 RX ORDER — TIRZEPATIDE 2.5 MG/.5ML
INJECTION, SOLUTION SUBCUTANEOUS
COMMUNITY
Start: 2025-05-02 | End: 2025-06-04 | Stop reason: WASHOUT

## 2025-06-04 NOTE — PATIENT INSTRUCTIONS
"We will call you with the results of the sleep study.    No changes in your cardiac medications.    It may be reasonable to give you a \"test dose\" or small dose  of the sodium hyaluronate to see if you have an allergic reaction.  "

## 2025-06-05 ENCOUNTER — TELEPHONE (OUTPATIENT)
Dept: ORTHOPEDIC SURGERY | Facility: CLINIC | Age: 70
End: 2025-06-05

## 2025-06-05 ENCOUNTER — APPOINTMENT (OUTPATIENT)
Dept: DERMATOLOGY | Facility: CLINIC | Age: 70
End: 2025-06-05
Payer: MEDICARE

## 2025-06-05 DIAGNOSIS — Z12.83 ENCOUNTER FOR SCREENING FOR MALIGNANT NEOPLASM OF SKIN: ICD-10-CM

## 2025-06-05 DIAGNOSIS — L90.5 SCAR CONDITIONS AND FIBROSIS OF SKIN: ICD-10-CM

## 2025-06-05 DIAGNOSIS — Z85.828 PERSONAL HISTORY OF OTHER MALIGNANT NEOPLASM OF SKIN: ICD-10-CM

## 2025-06-05 DIAGNOSIS — I87.2 VENOUS STASIS DERMATITIS OF BOTH LOWER EXTREMITIES: ICD-10-CM

## 2025-06-05 DIAGNOSIS — L82.1 SEBORRHEIC KERATOSIS: ICD-10-CM

## 2025-06-05 DIAGNOSIS — L57.8 PHOTOAGING OF SKIN: Primary | ICD-10-CM

## 2025-06-05 DIAGNOSIS — L57.0 ACTINIC KERATOSIS: ICD-10-CM

## 2025-06-05 DIAGNOSIS — L81.4 LENTIGO: ICD-10-CM

## 2025-06-05 DIAGNOSIS — L21.9 SEBORRHEIC DERMATITIS: ICD-10-CM

## 2025-06-05 RX ORDER — KETOCONAZOLE 20 MG/ML
SHAMPOO, SUSPENSION TOPICAL
Qty: 120 ML | Refills: 11 | Status: SHIPPED | OUTPATIENT
Start: 2025-06-05

## 2025-06-05 ASSESSMENT — DERMATOLOGY QUALITY OF LIFE (QOL) ASSESSMENT
RATE HOW EMOTIONALLY BOTHERED YOU ARE BY YOUR SKIN PROBLEM (FOR EXAMPLE, WORRY, EMBARRASSMENT, FRUSTRATION): 0 - NEVER BOTHERED
ARE THERE EXCLUSIONS OR EXCEPTIONS FOR THE QUALITY OF LIFE ASSESSMENT: NO
WHAT SINGLE SKIN CONDITION LISTED BELOW IS THE PATIENT ANSWERING THE QUALITY-OF-LIFE ASSESSMENT QUESTIONS ABOUT: NONE OF THE ABOVE
RATE HOW BOTHERED YOU ARE BY SYMPTOMS OF YOUR SKIN PROBLEM (EG, ITCHING, STINGING BURNING, HURTING OR SKIN IRRITATION): 1
RATE HOW BOTHERED YOU ARE BY EFFECTS OF YOUR SKIN PROBLEMS ON YOUR ACTIVITIES (EG, GOING OUT, ACCOMPLISHING WHAT YOU WANT, WORK ACTIVITIES OR YOUR RELATIONSHIPS WITH OTHERS): 0 - NEVER BOTHERED

## 2025-06-05 ASSESSMENT — ITCH NUMERIC RATING SCALE: HOW SEVERE IS YOUR ITCHING?: 0

## 2025-06-05 ASSESSMENT — DERMATOLOGY PATIENT ASSESSMENT
DO YOU HAVE ANY NEW OR CHANGING LESIONS: YES
FOR PATIENTS COMING IN FOR A FOLLOW-UP VISIT - HAVE THERE BEEN ANY CHANGES IN YOUR HEALTH SINCE YOUR LAST VISIT: ALL IN MYCHART
ARE YOU AN ORGAN TRANSPLANT RECIPIENT: NO
DO YOU HAVE IRREGULAR MENSTRUAL CYCLES: NO
DO YOU USE A TANNING BED: NO
DO YOU USE SUNSCREEN: OCCASIONALLY
ARE YOU ON BIRTH CONTROL: NO
ARE YOU TRYING TO GET PREGNANT: NO

## 2025-06-05 ASSESSMENT — PATIENT GLOBAL ASSESSMENT (PGA): PATIENT GLOBAL ASSESSMENT: PATIENT GLOBAL ASSESSMENT:  1 - CLEAR

## 2025-06-05 NOTE — TELEPHONE ENCOUNTER
PT WOULD LIKE TO TALK TO YOU IN REGARDS TO HER INJECTION TOMORROW. PLEASE CALL HER AT YOUR CONVENIENCE   [phone]

## 2025-06-05 NOTE — Clinical Note
Biopsy initially showing likely BCC, repeat biopsy with scar    Reassured no evidence of malignancy at this time.

## 2025-06-05 NOTE — Clinical Note
Lesions are due to cumulative sun damage over time and are pre-cancerous. They have a risk (although small in majority of patients) of developing into squamous cell carcinoma and therefore treatment recommendations were offered and discussed.   Treatments Discussed include LN2, photodynamic (blue light) therapy & topical chemotherapy creams, risks and benefits of each.     The risks and benefits of LN2 were reviewed including incomplete removal, crusting, blister hypo and/or hyperpigmentation, scarring and recurrence. Pt elected for LN2 today    If lesions fail to resolve after 3-4 weeks should contact office.  Biopsy in reserve of the right alar crease

## 2025-06-05 NOTE — PROGRESS NOTES
Subjective     Zeny Scott is a 70 y.o. female who presents for the following: Skin Check (Pt here for 6 month FBSE. Hx of BCC and AKs. Pt has concerns left cheek, nose and recheck previous bx site(Left 2nd finger metacarpophalangeal joint)- feels tender.).     Intake Questions  Do you have any new or changing Lesions?: Yes  Where are these new or changing lesion(s) located?: face, left hand  For patients coming in for a Follow-up Visit:  Have there been any changes in your health since your last visit?: all in mychart  Are you an organ transplant recipient?: No  Do you use sunscreen?: Occasionally  Do you use a tanning bed?: No  Are you trying to get pregnant?: No  Are you on birth control?: No  Do you have irregular menstrual cycles?: No    Review of Systems:  No other skin or systemic complaints other than what is documented elsewhere in the note.    The following portions of the chart were reviewed this encounter and updated as appropriate:         Skin Cancer History  Biopsy Log Book  Biopsied Type Location Status   10/2/24 BCC, Superficial Left 2nd Finger Metacarpophalangeal Joint Treatment Complete  2/10/25   1/29/25 Other Benign Neoplasm Left 2nd Finger Metacarpophalangeal Joint Treatment Complete  3/3/25       Additional History      Specialty Problems          Dermatology Problems    Actinic keratosis    Angioma    Dermatitis    Hemangioma of skin and subcutaneous tissue    Intertrigo    Lentigines    Melanocytic nevi of other parts of face    Melanocytic nevi of scalp and neck    Melanocytic nevi of trunk    Melanocytic nevi of unspecified lower limb, including hip    Melanocytic nevi of unspecified upper limb, including shoulder    Neoplasm of uncertain behavior of skin    Onychodystrophy    Seborrheic dermatitis    Skin changes due to chronic exposure to nonionizing radiation, unspecified    Ear itch    Otomycosis of right ear    Asteatosis cutis    History of skin cancer    History of BCC on left  lateral neck diagnosed and treated by her previous outside Dermatologist, Dr. Bruno, in approximately 1990         Otomycosis of left ear    Ear itching        Objective   Well appearing patient in no apparent distress; mood and affect are within normal limits.    A full examination was performed including scalp, head, eyes, ears, nose, lips, neck, chest, axillae, abdomen, back, buttocks, bilateral upper extremities, bilateral lower extremities, hands, feet, fingers, toes, fingernails, and toenails. Declined examination of genitals, does see provider for routine pelvic examinations.   All findings within normal limits unless otherwise noted below.    Assessment/Plan   Skin Exam  1. PHOTOAGING OF SKIN  Generalized  Mottled pigmentation with telangiectasias and brown reticular macules in sun exposed areas of the body.  The risk of chronic, cumulative sun damage and risk of development of skin cancer was reviewed today.   The importance of sun protection was reviewed: including the use of a broad spectrum sunscreen that protects against both UVA/UVB rays, with ingredients such as Zinc oxide or titanium dioxide, wearing sun protective clothing and sun avoidance. We reviewed the warning signs of non-melanoma skin cancer and ABCDEs of melanoma  Please follow up should you notice any new or changing pre-existing skin lesion.  2. SEBORRHEIC DERMATITIS  Nose, medial chest  Scalp clear  The chronic and intermittently flaring nature of this skin condition was discussed with patient today.   This is due to a yeast that everyone has on their skin that results in redness, dryness and in some patients itching.    Various treatment options were reviewed including topical antifungals and topical steroids depending upon severity and symptoms. Patient advised we cannot cure this condition, but it can be controlled.     - Continue ketoconazole 2% cream, apply to affected areas on the chest and alar crease of nose   - Can also start  ketoconazole 2% shampoo, lather for 3-5 minutes every other day on the chest, face then rinse  Related Medications  ketoconazole (NIZOral) 2 % cream  Apply topically 2 times a day.  ketoconazole (NIZOral) 2 % shampoo  Lather onto the scalp every other day for 5 minutes then rinse  3. ACTINIC KERATOSIS (5)  Chest - Medial (Center), Left Buccal Cheek, Left Upper Cutaneous Lip, Mid Frontal Scalp, Right Alar Crease  Erythematous macules with gritty scale.  Lesions are due to cumulative sun damage over time and are pre-cancerous. They have a risk (although small in majority of patients) of developing into squamous cell carcinoma and therefore treatment recommendations were offered and discussed.   Treatments Discussed include LN2, photodynamic (blue light) therapy & topical chemotherapy creams, risks and benefits of each.     The risks and benefits of LN2 were reviewed including incomplete removal, crusting, blister hypo and/or hyperpigmentation, scarring and recurrence. Pt elected for LN2 today    If lesions fail to resolve after 3-4 weeks should contact office.  Biopsy in reserve of the right alar crease  Destr of lesion - Chest - Medial (Center), Left Buccal Cheek, Left Upper Cutaneous Lip, Mid Frontal Scalp, Right Alar Crease  Complexity: simple    Destruction method: cryotherapy    Informed consent: discussed and consent obtained    Lesion destroyed using liquid nitrogen: Yes    Region frozen until ice ball extended beyond lesion: Yes    Cryotherapy cycles:  1  Outcome: patient tolerated procedure well with no complications    Post-procedure details: wound care instructions given    4. PERSONAL HISTORY OF OTHER MALIGNANT NEOPLASM OF SKIN  Left Anterior Neck  Well healed scar at site of prior treatment without evidence of recurrence.  There is no evidence of recurrence on clinical examination today, reassurance was provided to the patient. The importance of sun protection was reviewed with the patient including the  use of a broad spectrum sunscreen that protects against both UVA/UVB rays, with ingredients such as Zinc oxide or titanium dioxide, wearing sun protective clothing and sun avoidance. Warning signs of non-melanoma skin cancer were reviewed. ABCDEs of melanoma reviewed. Patient to f/u should they notice any new or changing pre-existing skin lesion  Related Procedures  Follow Up In Dermatology - Established Patient  5. LENTIGO  Generalized  Scattered tan macules in sun-exposed areas.  These are benign skin lesions due to sun exposure. They will darken in response to sun exposure. They should be monitored for change in size, shape or color.  These lesions can be treated cosmetically with topical creams, liquid nitrogen and a variety of lasers.  6. SEBORRHEIC KERATOSIS  Generalized  Brown, tan waxy macules and stuck on appearing papules and plaques  The benign nature of these skin lesions reviewed, reassure provided and no further treatment needed at this time.   These lesions can be removed, if symptomatic (itching, bleeding, rubbing on clothing, painful), otherwise removal is considered cosmetic.   7. VENOUS STASIS DERMATITIS OF BOTH LOWER EXTREMITIES  Left Lower Leg - Anterior, Right Lower Leg - Anterior  Legs with slight erythema, otherwise clear  The nature of the diagnosis was reviewed with the patient today.  Discoloration and swelling related to valve dysfunction, subsequent backflow, resulting in fluid into the tissues.  The discoloration is unlikely to fade over time.  Recommend the use of daily compression stockings - thigh high.    Can repeat triamcinolone 0.1% cream if flares. Patient to apply 2x daily x 2 wks then decrease to 2x/day 2 days per week. Can repeat full 2 week course as often as every 6-8 weeks as needed for flaring. Side effects of topical steroids includes, but is not limited to skin atrophy and dyspigmentation.        Related Medications  triamcinolone (Kenalog) 0.025 % ointment  Apply  topically 2 times a day.  triamcinolone (Kenalog) 0.1 % cream  Apply topically 2 times a day for 14 days, then reduce frequency to twice a week.  Repeat as needed, but do not use more than 2 weeks per month.  8. SCAR CONDITIONS AND FIBROSIS OF SKIN  Left 2nd Finger Metacarpophalangeal Joint  Pink scar without scale or crust  Biopsy initially showing likely BCC, repeat biopsy with scar    Reassured no evidence of malignancy at this time.  9. ENCOUNTER FOR SCREENING FOR MALIGNANT NEOPLASM OF SKIN  Generalized  No suspicious lesions noted on examination today  The risk of chronic, cumulative sun damage and risk of development of skin cancer was reviewed today.   The importance of sun protection was reviewed: including the use of a broad spectrum sunscreen of at least SPF 30 that protects against both UVA/UVB rays, with ingredients such as Zinc oxide or titanium dioxide, wearing sun protective clothing and sun avoidance. We reviewed the warning signs of non-melanoma skin cancer and ABCDEs of melanoma  Please follow up should you notice any new or changing pre-existing skin lesion.    Follow up in 6 months for FBSE

## 2025-06-05 NOTE — Clinical Note
The nature of the diagnosis was reviewed with the patient today.  Discoloration and swelling related to valve dysfunction, subsequent backflow, resulting in fluid into the tissues.  The discoloration is unlikely to fade over time.  Recommend the use of daily compression stockings - thigh high.    Can repeat triamcinolone 0.1% cream if flares. Patient to apply 2x daily x 2 wks then decrease to 2x/day 2 days per week. Can repeat full 2 week course as often as every 6-8 weeks as needed for flaring. Side effects of topical steroids includes, but is not limited to skin atrophy and dyspigmentation.

## 2025-06-05 NOTE — Clinical Note
The chronic and intermittently flaring nature of this skin condition was discussed with patient today.   This is due to a yeast that everyone has on their skin that results in redness, dryness and in some patients itching.    Various treatment options were reviewed including topical antifungals and topical steroids depending upon severity and symptoms. Patient advised we cannot cure this condition, but it can be controlled.     - Continue ketoconazole 2% cream, apply to affected areas on the chest and alar crease of nose   - Can also start ketoconazole 2% shampoo, lather for 3-5 minutes every other day on the chest, face then rinse

## 2025-06-06 ENCOUNTER — OFFICE VISIT (OUTPATIENT)
Dept: ORTHOPEDIC SURGERY | Facility: CLINIC | Age: 70
End: 2025-06-06
Payer: MEDICARE

## 2025-06-06 ENCOUNTER — TELEPHONE (OUTPATIENT)
Dept: CARDIOLOGY | Facility: CLINIC | Age: 70
End: 2025-06-06
Payer: MEDICARE

## 2025-06-06 DIAGNOSIS — M17.12 ARTHRITIS OF LEFT KNEE: Primary | ICD-10-CM

## 2025-06-06 PROCEDURE — 1036F TOBACCO NON-USER: CPT

## 2025-06-06 PROCEDURE — 99212 OFFICE O/P EST SF 10 MIN: CPT

## 2025-06-06 PROCEDURE — 1159F MED LIST DOCD IN RCRD: CPT

## 2025-06-06 PROCEDURE — 1160F RVW MEDS BY RX/DR IN RCRD: CPT

## 2025-06-06 NOTE — TELEPHONE ENCOUNTER
----- Message from Kirk Tee sent at 6/6/2025  8:48 AM EDT -----  Sleep study:  Mild sleep apnea, no need for CPAP at this time  ----- Message -----  From: Laura Snider - Lab Results In  Sent: 6/5/2025   7:48 AM EDT  To: Kirk Tee MD

## 2025-06-06 NOTE — PROGRESS NOTES
Patient presents to the office today to discuss side effects from previous Euflexxa injection into the left knee, which was provided May 29, 2025.  Patient states that she went home and took a nap, and when she awoke she had excruciating abdominal pain.  Abdominal pain was accompanied by chills, diaphoresis, nausea, vomiting and diarrhea.  Patient was in so much pain that she called the squad to take her to the emergency department.  CT of the abdomen and pelvis showed no acute abnormality.  EKG without ischemic changes.  She was given fluids, Pepcid and Zofran as well as a GI cocktail with improvement of symptoms.  Patient did call and discuss this with me the next day.  She states that her knee is actually feeling some degree of improvement from the injection and GI symptoms have subsided.  She is concerned about receiving today's injection.  Patient states that she also followed up with her cardiologist, Dr. Tee, who suggested that she had an allergic type reaction to the Euflexxa injection, and recommended following up with orthopedics to discuss further injections.    Plan: Discussion with patient in regards to reaction from Euflexxa injections possibly.  As patient has seen improvement with just 1 injection, at this time, we have decided to not proceed with the remaining 2 injections, as patient is apprehensive due to GI side effects she experienced.  She states that cortisone injections have not worked for her in the past.  Patient is aware that if anytime she would like to continue with Euflexxa injections, she can call the office.  She can follow-up with myself or Dr. Savage in the future.

## 2025-06-09 ENCOUNTER — TELEPHONE (OUTPATIENT)
Dept: GASTROENTEROLOGY | Facility: CLINIC | Age: 70
End: 2025-06-09

## 2025-06-09 NOTE — TELEPHONE ENCOUNTER
Patient called and stated that she was experiencing constipation and diarrhea, so that it was really difficult for her to even leave the house. Patient is asking for medication. Please advise. Thank you.

## 2025-06-11 ENCOUNTER — OFFICE VISIT (OUTPATIENT)
Dept: ORTHOPEDIC SURGERY | Facility: CLINIC | Age: 70
End: 2025-06-11
Payer: MEDICARE

## 2025-06-11 DIAGNOSIS — M65.321 TRIGGER INDEX FINGER OF RIGHT HAND: ICD-10-CM

## 2025-06-11 DIAGNOSIS — M79.641 RIGHT HAND PAIN: Primary | ICD-10-CM

## 2025-06-11 PROCEDURE — 2500000004 HC RX 250 GENERAL PHARMACY W/ HCPCS (ALT 636 FOR OP/ED): Performed by: ORTHOPAEDIC SURGERY

## 2025-06-11 PROCEDURE — 99212 OFFICE O/P EST SF 10 MIN: CPT | Mod: 25

## 2025-06-11 PROCEDURE — 20550 NJX 1 TENDON SHEATH/LIGAMENT: CPT | Mod: RT | Performed by: ORTHOPAEDIC SURGERY

## 2025-06-11 RX ORDER — TRIAMCINOLONE ACETONIDE 40 MG/ML
10 INJECTION, SUSPENSION INTRA-ARTICULAR; INTRAMUSCULAR
Status: COMPLETED | OUTPATIENT
Start: 2025-06-11 | End: 2025-06-11

## 2025-06-11 RX ORDER — LIDOCAINE HYDROCHLORIDE 20 MG/ML
0.5 INJECTION, SOLUTION INFILTRATION; PERINEURAL
Status: COMPLETED | OUTPATIENT
Start: 2025-06-11 | End: 2025-06-11

## 2025-06-11 RX ADMIN — LIDOCAINE HYDROCHLORIDE 0.5 ML: 20 INJECTION, SOLUTION INFILTRATION; PERINEURAL at 10:07

## 2025-06-11 RX ADMIN — TRIAMCINOLONE ACETONIDE 10 MG: 40 INJECTION, SUSPENSION INTRA-ARTICULAR; INTRAMUSCULAR at 10:07

## 2025-06-11 NOTE — PROGRESS NOTES
Subjective    Patient ID: Zeny Scott is a 70 y.o. female.    Chief Complaint: Pain of the Right Index Finger (Nki/+pain +clicking +locking x couple months/)     Last Surgery: No surgery found  Last Surgery Date: No surgery found    HPI  The patient is a 70-year-old right-hand-dominant female who comes in with a complaint of right index finger pain and locking.  She wakes up each morning with the index finger locked in flexion.  She has not had any previous treatment.  She denies trauma and numbness to her right hand.    Objective   Ortho Exam  The patient is in no acute distress.  Exam of her right hand and wrist reveals her skin envelope is intact.  She has tenderness over the index A1 pulley.  The index finger was triggering with flexion at the PIP joint.    Assessment/Plan   Encounter Diagnoses:  Right hand pain    Trigger index finger of right hand    Patient has evidence of a right index finger trigger digit.  We discussed treatment options and she elected undergo a Kenalog injection.    Hand / UE Inj/Asp: R index A1 for trigger finger on 6/11/2025 10:07 AM  Indications: pain  Details: 25 G needle, volar approach  Medications: 10 mg triamcinolone acetonide 40 mg/mL; 0.5 mL lidocaine 20 mg/mL (2 %)  Outcome: tolerated well, no immediate complications  Procedure, treatment alternatives, risks and benefits explained, specific risks discussed. Consent was given by the patient. Immediately prior to procedure a time out was called to verify the correct patient, procedure, equipment, support staff and site/side marked as required. Patient was prepped and draped in the usual sterile fashion.       The patient was informed that takes about a week for the injection have an effect.  She otherwise will follow-up as her symptoms dictate.

## 2025-06-12 ENCOUNTER — APPOINTMENT (OUTPATIENT)
Dept: ORTHOPEDIC SURGERY | Facility: CLINIC | Age: 70
End: 2025-06-12
Payer: MEDICARE

## 2025-06-12 ENCOUNTER — TELEPHONE (OUTPATIENT)
Dept: ORTHOPEDIC SURGERY | Facility: CLINIC | Age: 70
End: 2025-06-12
Payer: MEDICARE

## 2025-06-12 DIAGNOSIS — M17.0 ARTHRITIS OF BOTH KNEES: Primary | ICD-10-CM

## 2025-06-12 RX ORDER — LIDOCAINE 50 MG/G
1 PATCH TOPICAL DAILY
Qty: 30 PATCH | Refills: 1 | Status: SHIPPED | OUTPATIENT
Start: 2025-06-12 | End: 2025-07-12

## 2025-07-03 ENCOUNTER — CLINICAL SUPPORT (OUTPATIENT)
Dept: AUDIOLOGY | Facility: CLINIC | Age: 70
End: 2025-07-03
Payer: MEDICARE

## 2025-07-03 DIAGNOSIS — H90.A32 MIXED CONDUCTIVE AND SENSORINEURAL HEARING LOSS OF LEFT EAR WITH RESTRICTED HEARING OF RIGHT EAR: Primary | ICD-10-CM

## 2025-07-03 DIAGNOSIS — H90.A21 SENSORINEURAL HEARING LOSS (SNHL) OF RIGHT EAR WITH RESTRICTED HEARING OF LEFT EAR: ICD-10-CM

## 2025-07-03 NOTE — PROGRESS NOTES
Attempted Hearing Aid Evaluation    Zeny Scott, a 70 y.o.-old woman, was seen today for a Hearing Aid Evaluation.      Identified that patient has a hearing benefit through [employer].  Patient wishes to proceed with a  [employer] provider.      Rec.: return for audiometry as previously recommended.    Continue with Hearing Aid Evaluation (patient wishes to do this with a provider who is contracted with her insurance).

## 2025-07-09 LAB
NON-UH HIE FREE T3: 6.6 PG/ML (ref 2.3–4.2)
NON-UH HIE FREE T4: 1.76 NG/DL (ref 0.89–1.76)
NON-UH HIE HGB A1C: 8.3 %
NON-UH HIE TSH: <0.01 UIU/ML (ref 0.55–4.78)

## 2025-08-03 NOTE — PROGRESS NOTES
"    History Of Present Illness:      This is a 70-year-old female with atrial fibrillation.    Zeny was initially seen in consultation on 2024 at the request of Dr. Tee.  The patient has been feeling more short of breath on exertion.  She has a long history of atrial fibrillation and has had cardioversions in the past.  She reports having 3 cardioversions, but only maintained sinus rhythm for 1 or 2 days after the procedure.       Past medical history:     Coronary artery disease  Longstanding persistent atrial fibrillation  Diabetes mellitus  Hepatic cirrhosis  Cholecystectomy  Hypertension     Past surgical history: Cholecystectomy   x 2     Social history: Non-smoker     Family history: Positive for coronary artery disease    Review of Systems  Other review of systems negative  Last Recorded Vitals:      4/15/2025     8:22 AM 2025     3:15 PM 2025     2:10 PM 2025    11:23 AM 2025    10:11 AM 2025    12:56 PM 2025     9:20 AM   Vitals   Systolic  130 136 139  152 122   Diastolic  72 82 73  88 78   BP Location  Left arm     Left arm   Heart Rate  68 78 83  62 84   Temp   36.7 °C (98.1 °F) 36.8 °C (98.2 °F)  36.8 °C (98.3 °F)    Resp    16      Height 1.6 m (5' 3\") 1.6 m (5' 3\") 1.6 m (5' 3\") 1.6 m (5' 3\") 1.6 m (5' 3\") 1.6 m (5' 3\")    Weight (lb) 189 189 192 193 189 192 192   BMI 33.48 kg/m2 33.48 kg/m2 34.01 kg/m2 34.19 kg/m2 33.48 kg/m2 34.01 kg/m2 34.01 kg/m2   BSA (m2) 1.95 m2 1.95 m2 1.97 m2 1.97 m2 1.95 m2 1.97 m2 1.97 m2   Visit Report Report Report Report Report Report Report Report     Allergies:  Oxycodone, Oxycodone-acetaminophen, Cyclobenzaprine, and Nickel  Outpatient Medications:  Current Outpatient Medications   Medication Instructions    amLODIPine (NORVASC) 5 mg, oral, Daily    atorvastatin (LIPITOR) 40 mg, oral, Daily    clotrimazole (Lotrimin) 1 % external solution Instill 4 drops into the left ear twice daily for the next 10 days.    " Eliquis 5 mg, oral, 2 times daily    Farxiga 10 mg, oral, Daily    fluticasone (Flonase) 50 mcg/actuation nasal spray Administer 2 sprays into each nostril, once daily.    FreeStyle Main 2 [place] misc use as directed to monitor blood glucose continuously    FreeStyle Main 2 Sensor kit use as directed and change every 14 days to monitor blood glucose continuously. use reader to scan at least 3 times per day    FreeStyle Precision Maninder Strips strip use as directed to monitor blood glucose three times daily    gabapentin (Neurontin) 100 mg capsule TAKE TWO CAPSULES BY MOUTH ONCE DAILY AT BEDTIME    glimepiride (AMARYL) 4 mg, Daily before breakfast    ketoconazole (NIZOral) 2 % cream Topical, 2 times daily    ketoconazole (NIZOral) 2 % shampoo Lather onto the scalp every other day for 5 minutes then rinse    LORazepam (ATIVAN) 1 mg, Every 6 hours PRN    methIMAzole (TAPAZOLE) 5 mg, Daily    metoprolol tartrate (LOPRESSOR) 25 mg, oral, 2 times daily    omeprazole (PRILOSEC) 40 mg, oral, Daily    pantoprazole (ProtoNix) 40 mg EC tablet 1 tablet, Daily (0630)    spironolactone (ALDACTONE) 25 mg, oral, Daily    triamcinolone (Kenalog) 0.025 % ointment Topical, 2 times daily    triamcinolone (Kenalog) 0.1 % cream Apply topically 2 times a day for 14 days, then reduce frequency to twice a week.  Repeat as needed, but do not use more than 2 weeks per month.       Physical Exam:    General Appearance:  Alert, oriented, no distress  Skin:  Warm and dry  Head and Neck:  No elevation of JVP, no carotid bruits  Cardiac Exam:  Rhythm is irregular, S1 and S2 are normal, no murmur S3 or S4  Lungs:  Clear to auscultation  Extremities:  no edema  Neurologic:  No focal deficits  Psychiatric:  Appropriate mood and behavior    Lab Results:    CMP:  Recent Labs     04/28/25  0803 10/21/24  0830 10/09/24  1229 09/27/24  1216 03/27/24  1345 02/12/24  1118 10/09/23  1119 08/22/23  1111 04/07/23  1359 03/07/23  1509 02/04/21  0913  10/30/20  0527 10/29/20  0744 10/28/20  0557 10/27/20  0643 10/26/20  0550 10/24/20  1420 10/21/20  0445    137 141 139 139 138 136 141   < > 136   < > 136 139 136 133* 135* 138 134*   K 4.0 3.6 3.4* 3.9 4.2 4.1 4.9 4.2   < > 4.2   < > 3.8 4.2 3.5 3.3* 3.2* 4.4 4.0    103 101 107 103 102 101 107   < > 100   < > 102 104 102 100 101 98 95*   CO2 24 28 33* 26 25 27 27 25   < > 30   < > 26 27 25 28 27 27 29   ANIONGAP 11 10 10 10 15 13 13 13   < > 10   < > 12 12 13 8* 10 17 14   BUN 17 17 20 18 17 26* 22 29*   < > 17   < > 20 17 19 14 16 33* 23   CREATININE 0.48* 0.41* 0.46* 0.48* 0.66 0.70 0.72 0.81   < > 0.49*   < > 0.53 0.52 0.52 0.43* 0.39* 0.68 0.62   EGFR 102 >90 >90 >90 >90 >90 >90  --   --   --   --   --   --   --   --   --   --   --    MG  --   --   --   --   --   --   --   --   --  1.47*  --  1.49* 1.72 1.51* 1.40* 1.18* 1.66 1.47*    < > = values in this interval not displayed.     Recent Labs     04/28/25  0803 10/21/24  0830 09/27/24  1216 02/12/24  1118 10/09/23  1119 04/07/23  1359 03/07/23  1509 12/20/22  1003 10/04/21  1323 06/03/21  1025 03/25/19  0949 01/04/19  1520   ALBUMIN 4.3 4.0 3.8 4.2 4.2   < > 4.1 3.8   < > 4.0   < > 3.5   ALKPHOS 215* 140* 133 155* 171*   < > 193* 166*   < > 194*   < > 207*   ALT 18 16 19 26 20   < > 23 16   < > 24   < > 225*   AST 19 17 18 24 19   < > 24 15   < > 22   < > 190*   BILITOT 0.8 0.9 0.5 0.9 0.6   < > 0.6 0.5   < > 0.6   < > 1.3*   LIPASE  --   --   --  225*  --   --  48 105*  --  225*  --  104*    < > = values in this interval not displayed.     CBC:  Recent Labs     10/21/24  0830 09/27/24  1216 02/12/24  1118 10/09/23  1119 08/22/23  1111 04/07/23  1359 03/07/23  1509 12/20/22  1003   WBC 6.4 3.6* 5.1 5.2 6.3 7.1 6.3 4.3*   HGB 12.3 12.9 15.3 14.0 13.3 13.1 14.3 11.9*   HCT 38.6 40.5 47.3* 45.8 43.4 41.7 44.4 38.0    161 217 243 231 230 234 167   MCV 83 84 81 84 85 85 83 83     COAG:   Recent Labs     10/21/24  0916 09/27/24  1216  08/22/23  1111 02/09/22  1217 02/04/21  0913 10/24/20  1420 10/16/20  0537 11/22/19  0450 01/04/19  1520   INR  --  1.1 1.1 1.1 1.3* 1.2* 1.3* 1.3* 1.2*   DDIMERVTE 1,117*  --   --   --   --   --   --   --   --      Cardiology Tests (personally reviewed):    I have reviewed the following diagnostic studies and my interpretation is as follows:    Nuclear stress test March 2024: No ischemia, normal left ventricular function  Echocardiogram 2023: Ejection fraction 55 to 60%.  EKG review: Atrial fibrillation has been present on all EKGs since January 2019.  Holter monitor September 27, 2024: Atrial fibrillation with minimum heart rate 32 bpm, maximum heart rate 126 bpm, and average heart rate 67 bpm    Echocardiogram October 2024: Ejection fraction 60%, moderate mitral valve regurgitation    Holter monitor October 2024: Atrial fibrillation with a minimum heart rate of 32 bpm, maximum heart rate 126 bpm, and average heart rate 67 bpm.    Assessment/Plan   Problem List Items Addressed This Visit           ICD-10-CM    Chronic atrial fibrillation (Multi) - Primary (Chronic) I48.20    This patient has permanent atrial fibrillation which is well controlled.  The time of onset of the atrial fibrillation was sometime between January 2016 and January 2019 based on review of the electrocardiograms.  All electrocardiograms from January 2019 to present have shown atrial fibrillation.  Rhythm control strategies are not recommended as this will not be successful.  The patient is asymptomatic other than occasional palpitations.  Continue Eliquis for anticoagulation.  She has had no bleeding or bruising issues on anticoagulation.  EP follow-up will be as needed.  Continue follow-up with Dr. Tee as scheduled.            James C Ramicone, DO

## 2025-08-04 ENCOUNTER — OFFICE VISIT (OUTPATIENT)
Dept: OTOLARYNGOLOGY | Facility: CLINIC | Age: 70
End: 2025-08-04
Payer: MEDICARE

## 2025-08-04 ENCOUNTER — OFFICE VISIT (OUTPATIENT)
Dept: CARDIOLOGY | Facility: CLINIC | Age: 70
End: 2025-08-04
Payer: MEDICARE

## 2025-08-04 VITALS
OXYGEN SATURATION: 97 % | HEART RATE: 56 BPM | BODY MASS INDEX: 34.19 KG/M2 | SYSTOLIC BLOOD PRESSURE: 128 MMHG | DIASTOLIC BLOOD PRESSURE: 84 MMHG | WEIGHT: 193 LBS

## 2025-08-04 VITALS
BODY MASS INDEX: 34.27 KG/M2 | RESPIRATION RATE: 16 BRPM | HEIGHT: 63 IN | WEIGHT: 193.38 LBS | OXYGEN SATURATION: 97 % | DIASTOLIC BLOOD PRESSURE: 93 MMHG | TEMPERATURE: 98.3 F | HEART RATE: 70 BPM | SYSTOLIC BLOOD PRESSURE: 157 MMHG

## 2025-08-04 DIAGNOSIS — H92.12 OTORRHEA OF LEFT EAR: Primary | ICD-10-CM

## 2025-08-04 DIAGNOSIS — H61.302 ACQUIRED STENOSIS OF LEFT EXTERNAL EAR CANAL: ICD-10-CM

## 2025-08-04 DIAGNOSIS — I48.20 CHRONIC ATRIAL FIBRILLATION (MULTI): Primary | Chronic | ICD-10-CM

## 2025-08-04 PROCEDURE — 99214 OFFICE O/P EST MOD 30 MIN: CPT | Performed by: OTOLARYNGOLOGY

## 2025-08-04 PROCEDURE — 3077F SYST BP >= 140 MM HG: CPT | Performed by: OTOLARYNGOLOGY

## 2025-08-04 PROCEDURE — 3008F BODY MASS INDEX DOCD: CPT | Performed by: OTOLARYNGOLOGY

## 2025-08-04 PROCEDURE — 99212 OFFICE O/P EST SF 10 MIN: CPT

## 2025-08-04 PROCEDURE — 1159F MED LIST DOCD IN RCRD: CPT | Performed by: INTERNAL MEDICINE

## 2025-08-04 PROCEDURE — 1159F MED LIST DOCD IN RCRD: CPT | Performed by: OTOLARYNGOLOGY

## 2025-08-04 PROCEDURE — 99214 OFFICE O/P EST MOD 30 MIN: CPT | Performed by: INTERNAL MEDICINE

## 2025-08-04 PROCEDURE — 1125F AMNT PAIN NOTED PAIN PRSNT: CPT | Performed by: OTOLARYNGOLOGY

## 2025-08-04 PROCEDURE — 3079F DIAST BP 80-89 MM HG: CPT | Performed by: INTERNAL MEDICINE

## 2025-08-04 PROCEDURE — 3074F SYST BP LT 130 MM HG: CPT | Performed by: INTERNAL MEDICINE

## 2025-08-04 PROCEDURE — 3080F DIAST BP >= 90 MM HG: CPT | Performed by: OTOLARYNGOLOGY

## 2025-08-04 RX ORDER — NEOMYCIN SULFATE, POLYMYXIN B SULFATE, HYDROCORTISONE 3.5; 10000; 1 MG/ML; [USP'U]/ML; MG/ML
4 SOLUTION/ DROPS AURICULAR (OTIC) 2 TIMES DAILY
Qty: 2 ML | Refills: 2 | Status: SHIPPED | OUTPATIENT
Start: 2025-08-04 | End: 2025-08-09

## 2025-08-04 RX ORDER — TACROLIMUS 0.3 MG/G
OINTMENT TOPICAL
Qty: 30 G | Refills: 0 | Status: SHIPPED | OUTPATIENT
Start: 2025-08-04

## 2025-08-04 ASSESSMENT — ENCOUNTER SYMPTOMS
LOSS OF SENSATION IN FEET: 1
DEPRESSION: 0
OCCASIONAL FEELINGS OF UNSTEADINESS: 0

## 2025-08-04 ASSESSMENT — PAIN SCALES - GENERAL: PAINLEVEL_OUTOF10: 2

## 2025-08-04 NOTE — ASSESSMENT & PLAN NOTE
This patient has permanent atrial fibrillation which is well controlled.  The time of onset of the atrial fibrillation was sometime between January 2016 and January 2019 based on review of the electrocardiograms.  All electrocardiograms from January 2019 to present have shown atrial fibrillation.  Rhythm control strategies are not recommended as this will not be successful.  The patient is asymptomatic other than occasional palpitations.  Continue Eliquis for anticoagulation.  She has had no bleeding or bruising issues on anticoagulation.  EP follow-up will be as needed.  Continue follow-up with Dr. Tee as scheduled.

## 2025-08-04 NOTE — PROGRESS NOTES
Chief Complaint: ear drainage  Referred by:  Denise     Interval: Patient with continued left ear drainage and hearing loss. Failed mastoid powder. Minimal improvement with Ciprodex. Does continue to use q-tips.  - reports multiple on-going health issues causing decreased QoL       Interval: Patient follows up for medial canal fibrosis. Started on Ciprodex.  Using 4 drops weekly, plus applying some on a q-tip.   - Extensive left chronic otitis externa history.  Most recently has been on clotrimazole and steroid ointment.  Has also tried mastoid powder in the past, see media tab for scanned prescription.    HISTORY OF PRESENT ILLNESS:  This is a 69 yo female who presents today for ear drainage. Worse on the left.  Intermittent drainage for the last 5 years. Has had cipro drops, clotrimazole, and mastoid powder.  Notes from Vashti Walker reviewed. Patient does use q-tips. Also reviewed her diabetes control, which has not been the best (see labs below). No prior ear surgeries. Does not use hearing aids but says she has hearing loss. Remote history of ear infections as a child.  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   has a past surgical history that includes Other surgical history (2015); Other surgical history (2015);  section, classic (2015); Lithotripsy (2015); Gallbladder surgery (2015); Skin biopsy; and Mohs surgery.  Current Medications[1]  Review of patient's allergies indicates:  Allergies[2]  Social History and Family History: reviewed in the EMR  New patients fill out a 10-system review of systems survey that gets reviewed at their initial visit. Pertinent positives have been incorporated into the HPI.    PHYSICAL EXAM:   Vitals:    25 1439   BP: (!) 157/93   Pulse: 70   Resp: 16   Temp: 36.8 °C (98.3 °F)   TempSrc: Temporal   SpO2: 97%   Weight: 87.7 kg (193 lb 6 oz)   Height: 1.6 m (5' 3\")       The patient is well-developed, well-nourished and in no acute distress.    The patient is alert and oriented to time, person, and place with appropriate mood and affect.     EARS: Examination of the external ears was normal using visual inspection. Handheld otoscopy was inadequate to provide fine detail and depth perception necessary for a full diagnostic assessment of the tympanic membrane and middle ear space. The otologic microscope was utilized to improve visualization. Use of the otologic microscope facilitates cleaning of the EAC and three-dimensional, magnified visualization of the tympanic membrane and middle ear structures for diagnosis of observable pathology and anatomical variations. Otoscopic and/or microscopic evaluation reveals:        Right:  External auditory canal: patent   Tympanic Membrane: intact and normal   Middle ear: well aerated       Left: External auditory canal: medial canal fibrosis  Tympanic Membrane: inflamed   Middle ear: unable to assess    Eyes:  EOMI, vision grossly intact, no nystagmus   Neurologic:  CN 2-12 intact including normal facial movement and sensation     DATA REVIEWED:   AUDIOGRAMS   Date - " 05/2025      IMAGING  CT Head - 2/2025   - reviewed; middle ears and mastoids well aerated     OTHER  Labs: HgbA1C: 9.3 (2023) - 7.7 (2024)    PROCEDURE:  -none    ASSESSMENT & PLAN:  Problem List Items Addressed This Visit          ENT    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   Stop EAC manipulation. Dry ear precautions. Reinforced need for glucose/diabetes control.  - prophylactic Ciprodex - 4 drops weekly, ok to use if there is active drainage  - will do trial of tacrolimus - apply to left ear three times a week, patient also instructed to bring it with her to her future ear appointments so I can apply it onto the TM    Audiogram reviewed.  -Recommend hearing aid evaluation    -Follow-up 3-4 months    Angel Bowles M.D.     Otology/Neurotology   Department of Otolaryngology - Head and Neck Surgery  [place]  Phone/Appointments: [phone]   Fax: [phone]   E-mail: [email or web address]         [1]   Current Outpatient Medications:     amLODIPine (Norvasc) 5 mg tablet, Take 1 tablet (5 mg) by mouth once daily., Disp: 90 tablet, Rfl: 3    atorvastatin (Lipitor) 40 mg tablet, Take 1 tablet (40 mg) by mouth once daily., Disp: 90 tablet, Rfl: 3    clotrimazole (Lotrimin) 1 % external solution, Instill 4 drops into the left ear twice daily for the next 10 days. (Patient taking differently: if needed. Instill 4 drops into the left ear twice daily for the next 10 days.), Disp: 30 mL, Rfl: 0    Eliquis 5 mg tablet, Take 1 tablet (5 mg) by mouth 2 times a day., Disp: 60 tablet, Rfl: 11    Farxiga 10 mg, TAKE ONE TABLET BY MOUTH EVERY DAY, Disp: 90 tablet, Rfl: 3    fluticasone (Flonase) 50 mcg/actuation nasal spray, Administer 2 sprays into each nostril, once daily., Disp: 16 g, Rfl: 1    FreeStyle  Main 2 [place] misc, , Disp: , Rfl:     FreeStyle Main 2 Sensor kit, , Disp: , Rfl:     FreeStyle Precision Maninder Strips strip, , Disp: , Rfl:     gabapentin (Neurontin) 100 mg capsule, TAKE TWO CAPSULES BY MOUTH ONCE DAILY AT BEDTIME, Disp: 60 capsule, Rfl: 0    glimepiride (Amaryl) 4 mg tablet, Take 1 tablet (4 mg) by mouth once daily in the morning. Take before meals., Disp: , Rfl:     ketoconazole (NIZOral) 2 % cream, Apply topically 2 times a day., Disp: 60 g, Rfl: 2    ketoconazole (NIZOral) 2 % shampoo, Lather onto the scalp every other day for 5 minutes then rinse, Disp: 120 mL, Rfl: 11    LORazepam (Ativan) 1 mg tablet, Take 1 tablet (1 mg) by mouth every 6 hours if needed., Disp: , Rfl:     methIMAzole (Tapazole) 5 mg tablet, Take 1 tablet (5 mg) by mouth once daily., Disp: , Rfl:     metoprolol tartrate (Lopressor) 25 mg tablet, Take 1 tablet (25 mg) by mouth 2 times a day., Disp: 180 tablet, Rfl: 3    omeprazole (PriLOSEC) 40 mg DR capsule, Take 1 capsule (40 mg) by mouth once daily., Disp: 30 capsule, Rfl: 1    pantoprazole (ProtoNix) 40 mg EC tablet, Take 1 tablet (40 mg) by mouth early in the morning.., Disp: , Rfl:     spironolactone (Aldactone) 25 mg tablet, Take 1 tablet (25 mg) by mouth once daily., Disp: 30 tablet, Rfl: 11    triamcinolone (Kenalog) 0.025 % ointment, Apply topically 2 times a day., Disp: 80 g, Rfl: 1    triamcinolone (Kenalog) 0.1 % cream, Apply topically 2 times a day for 14 days, then reduce frequency to twice a week.  Repeat as needed, but do not use more than 2 weeks per month., Disp: 453.6 g, Rfl: 0  [2]   Allergies  Allergen Reactions    Oxycodone Shortness of breath    Oxycodone-Acetaminophen Shortness of breath    Cyclobenzaprine Unknown     Other Reaction(s): Drowsy    Nickel Unknown

## 2025-08-15 LAB
NON-UH HIE A/G RATIO: 1
NON-UH HIE ALB: 3.7 G/DL (ref 3.4–5)
NON-UH HIE ALK PHOS: 234 UNIT/L (ref 45–117)
NON-UH HIE BILIRUBIN, TOTAL: 0.5 MG/DL (ref 0.3–1.2)
NON-UH HIE BUN/CREAT RATIO: 22.5
NON-UH HIE BUN: 18 MG/DL (ref 9–23)
NON-UH HIE CALCIUM: 9.5 MG/DL (ref 8.7–10.4)
NON-UH HIE CALCULATED OSMOLALITY: 296 MOSM/KG (ref 275–295)
NON-UH HIE CHLORIDE: 103 MMOL/L (ref 98–107)
NON-UH HIE CO2, VENOUS: 29 MMOL/L (ref 20–31)
NON-UH HIE CREATININE: 0.8 MG/DL (ref 0.5–0.8)
NON-UH HIE FREE T3: 4.2 PG/ML (ref 2.3–4.2)
NON-UH HIE FREE T4: 1.39 NG/DL (ref 0.89–1.76)
NON-UH HIE GFR AA: >60
NON-UH HIE GLOBULIN: 3.6 G/DL
NON-UH HIE GLOMERULAR FILTRATION RATE: >60 ML/MIN/1.73M?
NON-UH HIE GLUCOSE: 360 MG/DL (ref 74–106)
NON-UH HIE GOT: 23 UNIT/L (ref 15–37)
NON-UH HIE GPT: 22 UNIT/L (ref 10–49)
NON-UH HIE K: 3.8 MMOL/L (ref 3.5–5.1)
NON-UH HIE NA: 140 MMOL/L (ref 135–145)
NON-UH HIE TOTAL PROTEIN: 7.3 G/DL (ref 5.7–8.2)
NON-UH HIE TSH: <0.01 UIU/ML (ref 0.55–4.78)

## 2025-08-18 ENCOUNTER — TELEPHONE (OUTPATIENT)
Dept: DERMATOLOGY | Facility: CLINIC | Age: 70
End: 2025-08-18
Payer: MEDICARE

## 2025-08-20 ENCOUNTER — OFFICE VISIT (OUTPATIENT)
Dept: DERMATOLOGY | Facility: CLINIC | Age: 70
End: 2025-08-20
Payer: MEDICARE

## 2025-08-20 DIAGNOSIS — I87.2 VENOUS STASIS DERMATITIS OF BOTH LOWER EXTREMITIES: ICD-10-CM

## 2025-08-20 DIAGNOSIS — L23.7 ALLERGIC CONTACT DERMATITIS DUE TO PLANTS, EXCEPT FOOD: Primary | ICD-10-CM

## 2025-08-20 PROCEDURE — 99213 OFFICE O/P EST LOW 20 MIN: CPT | Performed by: DERMATOLOGY

## 2025-08-20 PROCEDURE — 1159F MED LIST DOCD IN RCRD: CPT | Performed by: DERMATOLOGY

## 2025-08-20 RX ORDER — BETAMETHASONE VALERATE 1 MG/G
CREAM TOPICAL
Qty: 45 G | Refills: 2 | Status: SHIPPED | OUTPATIENT
Start: 2025-08-20

## 2025-08-20 RX ORDER — TRIAMCINOLONE ACETONIDE 1 MG/G
CREAM TOPICAL
Qty: 453.6 G | Refills: 0 | Status: SHIPPED | OUTPATIENT
Start: 2025-08-20

## 2025-08-20 ASSESSMENT — ITCH NUMERIC RATING SCALE: HOW SEVERE IS YOUR ITCHING?: 10

## 2025-08-27 ENCOUNTER — APPOINTMENT (OUTPATIENT)
Dept: OPHTHALMOLOGY | Facility: CLINIC | Age: 70
End: 2025-08-27
Payer: MEDICARE

## 2025-08-27 DIAGNOSIS — H35.89 RPE MOTTLING OF MACULA: ICD-10-CM

## 2025-08-27 DIAGNOSIS — H47.392 OPTIC DISC HEMORRHAGE, LEFT: ICD-10-CM

## 2025-08-27 DIAGNOSIS — H04.123 DRY EYES, BILATERAL: ICD-10-CM

## 2025-08-27 DIAGNOSIS — H52.4 PRESBYOPIA: ICD-10-CM

## 2025-08-27 DIAGNOSIS — E11.3293 MILD NONPROLIFERATIVE DIABETIC RETINOPATHY OF BOTH EYES WITHOUT MACULAR EDEMA ASSOCIATED WITH TYPE 2 DIABETES MELLITUS: ICD-10-CM

## 2025-08-27 DIAGNOSIS — H25.813 COMBINED FORMS OF AGE-RELATED CATARACT OF BOTH EYES: ICD-10-CM

## 2025-08-27 DIAGNOSIS — H52.223 REGULAR ASTIGMATISM OF BOTH EYES: ICD-10-CM

## 2025-08-27 DIAGNOSIS — H52.03 HYPERMETROPIA OF BOTH EYES: Primary | ICD-10-CM

## 2025-08-27 PROCEDURE — 92134 CPTRZ OPH DX IMG PST SGM RTA: CPT | Performed by: OPTOMETRIST

## 2025-08-27 PROCEDURE — 92015 DETERMINE REFRACTIVE STATE: CPT | Performed by: OPTOMETRIST

## 2025-08-27 PROCEDURE — 92014 COMPRE OPH EXAM EST PT 1/>: CPT | Performed by: OPTOMETRIST

## 2025-08-27 PROCEDURE — 2022F DILAT RTA XM EVC RTNOPTHY: CPT | Performed by: OPTOMETRIST

## 2025-08-27 ASSESSMENT — ENCOUNTER SYMPTOMS
RESPIRATORY NEGATIVE: 0
MUSCULOSKELETAL NEGATIVE: 0
HEMATOLOGIC/LYMPHATIC NEGATIVE: 0
PSYCHIATRIC NEGATIVE: 0
NEUROLOGICAL NEGATIVE: 0
CARDIOVASCULAR NEGATIVE: 0
GASTROINTESTINAL NEGATIVE: 0
EYES NEGATIVE: 1
CONSTITUTIONAL NEGATIVE: 0
ENDOCRINE NEGATIVE: 0
ALLERGIC/IMMUNOLOGIC NEGATIVE: 0

## 2025-08-27 ASSESSMENT — REFRACTION_WEARINGRX
OD_CYLINDER: -1.75
OD_AXIS: 080
OS_CYLINDER: -1.50
SPECS_TYPE: DVO
OS_AXIS: 090
OD_ADD: +2.50
OD_SPHERE: +1.50
OD_CYLINDER: -1.00
OS_AXIS: 090
OD_SPHERE: +1.50
OD_AXIS: 080
OD_SPHERE: +4.25
OS_SPHERE: +3.75
OS_AXIS: 090
OS_SPHERE: +1.75
OS_CYLINDER: -1.50
OS_SPHERE: +1.00
OS_ADD: +2.50
OD_AXIS: 080
OS_CYLINDER: -1.50
OD_CYLINDER: -1.75
SPECS_TYPE: NEAR ONLY

## 2025-08-27 ASSESSMENT — REFRACTION_MANIFEST
OD_SPHERE: +1.50
OS_ADD: +2.50
OS_SPHERE: +2.00
OS_CYLINDER: -1.50
OD_AXIS: 080
OD_CYLINDER: -1.00
OS_AXIS: 090
OD_ADD: +2.50

## 2025-08-27 ASSESSMENT — REFRACTION
OS_AXIS: 090
OS_CYLINDER: -1.50
OD_SPHERE: +1.75
OS_SPHERE: +2.00
OD_ADD: +2.50
OS_ADD: +2.50
OD_AXIS: 080
OD_CYLINDER: -1.00

## 2025-08-27 ASSESSMENT — CONF VISUAL FIELD
OD_NORMAL: 1
OD_SUPERIOR_TEMPORAL_RESTRICTION: 0
OD_INFERIOR_NASAL_RESTRICTION: 0
OS_INFERIOR_TEMPORAL_RESTRICTION: 0
OD_INFERIOR_TEMPORAL_RESTRICTION: 0
METHOD: COUNTING FINGERS
OS_SUPERIOR_NASAL_RESTRICTION: 0
OS_NORMAL: 1
OS_INFERIOR_NASAL_RESTRICTION: 0
OS_SUPERIOR_TEMPORAL_RESTRICTION: 0
OD_SUPERIOR_NASAL_RESTRICTION: 0

## 2025-08-27 ASSESSMENT — VISUAL ACUITY
OS_CC+: -
OD_CC: 20/20
OS_CC: 20/25
METHOD: SNELLEN - LINEAR

## 2025-08-27 ASSESSMENT — TONOMETRY
OS_IOP_MMHG: 11
OD_IOP_MMHG: 12
IOP_METHOD: GOLDMANN APPLANATION

## 2025-08-27 ASSESSMENT — EXTERNAL EXAM - LEFT EYE: OS_EXAM: NORMAL

## 2025-08-27 ASSESSMENT — EXTERNAL EXAM - RIGHT EYE: OD_EXAM: NORMAL

## 2025-08-27 ASSESSMENT — CUP TO DISC RATIO
OD_RATIO: 0.25
OS_RATIO: 0.25

## 2025-08-27 ASSESSMENT — SLIT LAMP EXAM - LIDS
COMMENTS: GOOD POSITION, DERMATOCHALASIS
COMMENTS: GOOD POSITION, DERMATOCHALASIS

## 2025-10-06 ENCOUNTER — APPOINTMENT (OUTPATIENT)
Dept: OTOLARYNGOLOGY | Facility: CLINIC | Age: 70
End: 2025-10-06
Payer: MEDICARE

## 2025-10-06 ENCOUNTER — APPOINTMENT (OUTPATIENT)
Dept: OPHTHALMOLOGY | Facility: CLINIC | Age: 70
End: 2025-10-06
Payer: MEDICARE

## 2025-12-04 ENCOUNTER — APPOINTMENT (OUTPATIENT)
Dept: DERMATOLOGY | Facility: CLINIC | Age: 70
End: 2025-12-04
Payer: MEDICARE

## 2026-02-26 ENCOUNTER — APPOINTMENT (OUTPATIENT)
Dept: OPHTHALMOLOGY | Facility: CLINIC | Age: 71
End: 2026-02-26
Payer: COMMERCIAL